# Patient Record
Sex: MALE | Race: OTHER
[De-identification: names, ages, dates, MRNs, and addresses within clinical notes are randomized per-mention and may not be internally consistent; named-entity substitution may affect disease eponyms.]

---

## 2017-03-08 ENCOUNTER — APPOINTMENT (OUTPATIENT)
Dept: GASTROENTEROLOGY | Facility: CLINIC | Age: 77
End: 2017-03-08

## 2017-03-08 VITALS
HEIGHT: 69 IN | DIASTOLIC BLOOD PRESSURE: 90 MMHG | RESPIRATION RATE: 16 BRPM | BODY MASS INDEX: 35.55 KG/M2 | SYSTOLIC BLOOD PRESSURE: 130 MMHG | HEART RATE: 75 BPM | WEIGHT: 240 LBS | OXYGEN SATURATION: 93 % | TEMPERATURE: 97.3 F

## 2017-05-05 ENCOUNTER — APPOINTMENT (OUTPATIENT)
Dept: GASTROENTEROLOGY | Facility: CLINIC | Age: 77
End: 2017-05-05

## 2017-05-05 VITALS
DIASTOLIC BLOOD PRESSURE: 80 MMHG | OXYGEN SATURATION: 98 % | TEMPERATURE: 97.8 F | SYSTOLIC BLOOD PRESSURE: 130 MMHG | HEIGHT: 69 IN | BODY MASS INDEX: 36.43 KG/M2 | WEIGHT: 246 LBS | HEART RATE: 71 BPM

## 2017-12-22 ENCOUNTER — APPOINTMENT (OUTPATIENT)
Dept: GASTROENTEROLOGY | Facility: CLINIC | Age: 77
End: 2017-12-22
Payer: MEDICARE

## 2017-12-22 VITALS
SYSTOLIC BLOOD PRESSURE: 128 MMHG | OXYGEN SATURATION: 96 % | HEART RATE: 63 BPM | BODY MASS INDEX: 35.59 KG/M2 | TEMPERATURE: 97.8 F | WEIGHT: 241 LBS | DIASTOLIC BLOOD PRESSURE: 80 MMHG

## 2017-12-22 PROCEDURE — 99214 OFFICE O/P EST MOD 30 MIN: CPT

## 2018-03-19 ENCOUNTER — RX RENEWAL (OUTPATIENT)
Age: 78
End: 2018-03-19

## 2018-03-23 ENCOUNTER — RX RENEWAL (OUTPATIENT)
Age: 78
End: 2018-03-23

## 2018-06-22 ENCOUNTER — APPOINTMENT (OUTPATIENT)
Dept: GASTROENTEROLOGY | Facility: CLINIC | Age: 78
End: 2018-06-22
Payer: MEDICARE

## 2018-06-22 VITALS
DIASTOLIC BLOOD PRESSURE: 78 MMHG | HEART RATE: 69 BPM | BODY MASS INDEX: 35.55 KG/M2 | HEIGHT: 69 IN | SYSTOLIC BLOOD PRESSURE: 118 MMHG | OXYGEN SATURATION: 94 % | WEIGHT: 240 LBS

## 2018-06-22 PROCEDURE — 99213 OFFICE O/P EST LOW 20 MIN: CPT

## 2019-01-04 ENCOUNTER — APPOINTMENT (OUTPATIENT)
Dept: GASTROENTEROLOGY | Facility: CLINIC | Age: 79
End: 2019-01-04
Payer: MEDICARE

## 2019-01-04 VITALS
SYSTOLIC BLOOD PRESSURE: 128 MMHG | WEIGHT: 232 LBS | OXYGEN SATURATION: 97 % | HEIGHT: 69 IN | BODY MASS INDEX: 34.36 KG/M2 | TEMPERATURE: 97.7 F | HEART RATE: 79 BPM | RESPIRATION RATE: 16 BRPM | DIASTOLIC BLOOD PRESSURE: 74 MMHG

## 2019-01-04 DIAGNOSIS — K92.1 MELENA: ICD-10-CM

## 2019-01-04 PROCEDURE — 99214 OFFICE O/P EST MOD 30 MIN: CPT

## 2019-01-04 NOTE — CONSULT LETTER
[Dear  ___] : Dear  [unfilled], [Consult Letter:] : I had the pleasure of evaluating your patient, [unfilled]. [Please see my note below.] : Please see my note below. [Consult Closing:] : Thank you very much for allowing me to participate in the care of this patient.  If you have any questions, please do not hesitate to contact me. [Sincerely,] : Sincerely, [Federico Longoria M.D.] : Federico Longoria M.D. [Division of Gastroenterology] : Division of Gastroenterology [[facility]] : [facility] [[address] : [address] [[place], [place] [address]] : [place] [address] [FreeTextEntry2] : Dr. Almas Adams [FreeTextEntry3] : Federico Longoria M.D.

## 2019-01-04 NOTE — HISTORY OF PRESENT ILLNESS
[FreeTextEntry1] : Melvin Herron was evaluated for office revisit on 1/4/19. \par \par The patient is on a maintenance regimen of lansoprazole 30 mg daily and he has derived an excellent symptomatic response.\par \par The patient had undergone followup surveillance colonoscopy on 8/3/12. Seven small polyps were removed of which 4 were adenomas and 3 were hyperplastic polyps. Sigmoid diverticulosis was noted. Hemorrhoids were detected. An upper endoscopy was performed at the same session and the previously noted prepyloric antral diverticulum was again detected but was less prominent. A small 1 cm hiatus hernia was noted. Fundic gland gastric polyps were detected. A gastric biopsy tested negative for Helicobacter pylori.\par \par Colonoscopy was performed on 7/11/2016 at which time 8 colonic polyps were noted that were mostly small ranging in size from 3 to 5 mm. Most were in the ascending colon. The largest was a 10 mm proximal transverse colon sessile polyp. These polyps were 4 adenomas, 2 hyperplastic polyps and a descending colon sessile serrated polyp. Sigmoid diverticulosis was noted. Hemorrhoids were detected.\par \par Patient otherwise reports no new or interval change to his medical history or medications.\par \par ORV 3/8/17: Experienced scant episode of painless hematochezia. No blood noted on toilet paper after wiping but also on underwear and bed sheets. Has one formed bowel movement daily and observed no blood in the stool.\par \par ORV 5/5/17:    -Asymptomatic from a GI standpoint. Excellent appetite. Has gained 12 pounds over the past 6 months. Denies any complaints of dysphagia, heartburn, nausea, vomiting, abdominal pain, diarrhea, change in bowel habit or rectal bleeding.\par                        -Scant hematochezia previously reported on 3/8/17 resolved.\par                        -Patient remains on maintenance lansoprazole 30 mg daily.\par \par ORV 12/22/17:    -Patient is doing well from a GI standpoint. No complaints. Appetite and weight are stable. No current complaints of dysphagia, heartburn, nausea, vomiting or abdominal pain. Patient has regular bowel movements without any complaints of diarrhea, constipation, change in bowel habit or rectal bleeding.\par                            -Patient currently on lansoprazole 30 mg daily on maintenance basis. Patient reports no change to medications. Of note, the patient indicates that he ran out of nifedipine ER 90 mg daily which is one of his antihypertensive medications 2 weeks ago. \par \par ORV 6/22/18:    -Patient feels well. Asymptomatic from a GI standpoint. Appetite stable. Denies weight change. Denies any complaints of dysphagia, heartburn, nausea, vomiting or abdominal pain. Has regular daily bowel movements without any complaint of diarrhea, constipation, change in bowel habit or rectal bleeding.\par                          -Patient reports no change to medical status or medications since last examination. Patient remains in maintenance lansoprazole 30 mg daily.\par \par ORV 1/4/19:     -Presents for routine GI followup. Wife reports a few episodes of scant hematochezia over past several weeks. Primarily manifesting as red blood on the toilet paper or scant blood in the bowl. Patient has 2 formed bowel movements daily without any complaints of diarrhea, constipation, change in bowel habit, anal pain or melena. Appetite and weight are stable. Reports no complaints of heartburn, regurgitation, dysphagia, nausea, vomiting or abdominal pain.

## 2019-01-04 NOTE — REASON FOR VISIT
[Follow-Up: _____] : a [unfilled] follow-up visit [Spouse] : spouse [FreeTextEntry1] : for routine GI followup.

## 2019-01-04 NOTE — ASSESSMENT
[FreeTextEntry1] : Impression:\par \par #1 peptic ulcer disease-history of a walled off perforated prepyloric antral diverticulum. Healed and resolved-the patient is asymptomatic.\par \par #2 history of colonic polyps-history of multiple colonic polyps including frequent occurrence of metachronous lesions. The patient had 8 small polyps at his last colonoscopy on 7/11/2016 of which 4  proved to be adenomas, 2 were hyperplastic polyps and one was a descending colon sessile serrated polyp.\par \par #3 sigmoid diverticulosis.\par \par #4 hematochezia-suspect outlet etiology from hemorrhoids. Evaluate for any alternate etiology including any possibility of a colonic neoplastic process.\par \par #5 status post bilateral inguinal hernia repair.\par \par #6 obesity.\par \par #7 gastroesophageal reflux disease-asymptomatic on maintenance lansoprazole 30 mg daily.\par \par #8 varicose veins.\par \par #9 hypertension.\par \par #10 kidney stones\par

## 2019-01-04 NOTE — LETTER CLOSING
[Consult] : Thank you very much for allowing me to participate in the care of this patient. If you have any questions, please do not hesitate to contact me [Sincerely,] : Sincerely, [Federico Longoria M.D.] : Federico Longoria M.D. [Division of Gastroenterology] : Division of Gastroenterology [[facility]] : [facility] [[address] : [address] [[place], [place] [address]] : [place] [address]

## 2019-01-04 NOTE — PHYSICAL EXAM
[General Appearance - Alert] : alert [General Appearance - In No Acute Distress] : in no acute distress [General Appearance - Well Developed] : well developed [General Appearance - Well-Appearing] : healthy appearing [Sclera] : the sclera and conjunctiva were normal [Neck Appearance] : the appearance of the neck was normal [Respiration, Rhythm And Depth] : normal respiratory rhythm and effort [Exaggerated Use Of Accessory Muscles For Inspiration] : no accessory muscle use [Auscultation Breath Sounds / Voice Sounds] : lungs were clear to auscultation bilaterally [Heart Rate And Rhythm] : heart rate was normal and rhythm regular [Heart Sounds] : normal S1 and S2 [Heart Sounds Gallop] : no gallops [Murmurs] : no murmurs [Heart Sounds Pericardial Friction Rub] : no pericardial rub [Bowel Sounds] : normal bowel sounds [Abdomen Soft] : soft [Abdomen Tenderness] : non-tender [] : no hepato-splenomegaly [Abdomen Mass (___ Cm)] : no abdominal mass palpated [Normal Sphincter Tone] : normal sphincter tone [No Rectal Mass] : no rectal mass [Prostate Tenderness] : the prostate was not tender [Cervical Lymph Nodes Enlarged Posterior Bilaterally] : posterior cervical [Cervical Lymph Nodes Enlarged Anterior Bilaterally] : anterior cervical [Supraclavicular Lymph Nodes Enlarged Bilaterally] : supraclavicular [Abnormal Walk] : normal gait [Nail Clubbing] : no clubbing  or cyanosis of the fingernails [Skin Color & Pigmentation] : normal skin color and pigmentation [Impaired Insight] : insight and judgment were intact [Affect] : the affect was normal [Mood] : the mood was normal [FreeTextEntry1] : Large smooth prostate palpated. Soft light brown stool noted. No mass. Blood not detected.

## 2019-03-13 ENCOUNTER — MEDICATION RENEWAL (OUTPATIENT)
Age: 79
End: 2019-03-13

## 2019-03-13 ENCOUNTER — MESSAGE (OUTPATIENT)
Age: 79
End: 2019-03-13

## 2019-03-13 RX ORDER — LANSOPRAZOLE 30 MG/1
30 CAPSULE, DELAYED RELEASE ORAL
Qty: 90 | Refills: 3 | Status: COMPLETED | COMMUNITY
Start: 2017-12-22 | End: 2019-03-13

## 2019-03-13 RX ORDER — LANSOPRAZOLE 30 MG/1
30 CAPSULE, DELAYED RELEASE ORAL
Qty: 90 | Refills: 3 | Status: COMPLETED | COMMUNITY
Start: 2018-06-22 | End: 2019-03-13

## 2019-03-13 RX ORDER — LANSOPRAZOLE 30 MG/1
30 CAPSULE, DELAYED RELEASE ORAL
Qty: 90 | Refills: 3 | Status: COMPLETED | COMMUNITY
Start: 2017-05-05 | End: 2019-03-13

## 2019-05-17 ENCOUNTER — APPOINTMENT (OUTPATIENT)
Dept: GASTROENTEROLOGY | Facility: CLINIC | Age: 79
End: 2019-05-17

## 2019-06-20 ENCOUNTER — APPOINTMENT (OUTPATIENT)
Dept: GASTROENTEROLOGY | Facility: HOSPITAL | Age: 79
End: 2019-06-20

## 2019-06-20 ENCOUNTER — OUTPATIENT (OUTPATIENT)
Dept: OUTPATIENT SERVICES | Facility: HOSPITAL | Age: 79
LOS: 1 days | Discharge: HOME | End: 2019-06-20
Payer: MEDICARE

## 2019-06-20 ENCOUNTER — RESULT REVIEW (OUTPATIENT)
Age: 79
End: 2019-06-20

## 2019-06-20 DIAGNOSIS — K92.1 MELENA: ICD-10-CM

## 2019-06-20 DIAGNOSIS — N20.0 CALCULUS OF KIDNEY: Chronic | ICD-10-CM

## 2019-06-20 PROCEDURE — 45385 COLONOSCOPY W/LESION REMOVAL: CPT

## 2019-06-20 PROCEDURE — 88305 TISSUE EXAM BY PATHOLOGIST: CPT | Mod: 26

## 2019-06-20 PROCEDURE — 45380 COLONOSCOPY AND BIOPSY: CPT | Mod: 59

## 2019-06-20 RX ORDER — SODIUM CHLORIDE 9 MG/ML
1000 INJECTION, SOLUTION INTRAVENOUS
Refills: 0 | Status: DISCONTINUED | OUTPATIENT
Start: 2019-06-20 | End: 2019-07-05

## 2019-06-20 RX ADMIN — SODIUM CHLORIDE 30 MILLILITER(S): 9 INJECTION, SOLUTION INTRAVENOUS at 12:52

## 2019-06-21 LAB — SURGICAL PATHOLOGY STUDY: SIGNIFICANT CHANGE UP

## 2019-07-05 ENCOUNTER — APPOINTMENT (OUTPATIENT)
Dept: GASTROENTEROLOGY | Facility: CLINIC | Age: 79
End: 2019-07-05

## 2019-08-06 ENCOUNTER — APPOINTMENT (OUTPATIENT)
Dept: ORTHOPEDIC SURGERY | Facility: CLINIC | Age: 79
End: 2019-08-06
Payer: MEDICARE

## 2019-08-06 VITALS — WEIGHT: 236 LBS | HEIGHT: 69 IN | BODY MASS INDEX: 34.96 KG/M2

## 2019-08-06 DIAGNOSIS — M79.671 PAIN IN RIGHT FOOT: ICD-10-CM

## 2019-08-06 DIAGNOSIS — Z87.442 PERSONAL HISTORY OF URINARY CALCULI: ICD-10-CM

## 2019-08-06 DIAGNOSIS — Z78.9 OTHER SPECIFIED HEALTH STATUS: ICD-10-CM

## 2019-08-06 DIAGNOSIS — S93.601A UNSPECIFIED SPRAIN OF RIGHT FOOT, INITIAL ENCOUNTER: ICD-10-CM

## 2019-08-06 PROCEDURE — 73610 X-RAY EXAM OF ANKLE: CPT | Mod: RT

## 2019-08-06 PROCEDURE — 99204 OFFICE O/P NEW MOD 45 MIN: CPT

## 2019-08-06 PROCEDURE — 73630 X-RAY EXAM OF FOOT: CPT | Mod: RT

## 2019-08-06 RX ORDER — LANSOPRAZOLE 30 MG/1
30 CAPSULE, DELAYED RELEASE ORAL
Qty: 90 | Refills: 3 | Status: DISCONTINUED | COMMUNITY
Start: 2019-01-04 | End: 2019-08-06

## 2019-08-06 RX ORDER — POLYETHYLENE GLYCOL 3350, SODIUM SULFATE, SODIUM CHLORIDE, POTASSIUM CHLORIDE, ASCORBIC ACID, SODIUM ASCORBATE 7.5-2.691G
100 KIT ORAL
Qty: 1 | Refills: 0 | Status: DISCONTINUED | COMMUNITY
Start: 2019-01-04 | End: 2019-08-06

## 2019-08-06 RX ORDER — PRAMOXINE HYDROCHLORIDE AND HYDROCORTISONE ACETATE 10; 25 MG/G; MG/G
2.5-1 CREAM TOPICAL
Qty: 28.35 | Refills: 2 | Status: DISCONTINUED | COMMUNITY
Start: 2017-03-08 | End: 2019-08-06

## 2019-08-07 NOTE — DISCUSSION/SUMMARY
[de-identified] : Patient was advised of his findings. He appears to have a medial plantar arch devin and was prescribed bilateral shoe orthotics.\par \par Followup p.r.n.

## 2019-08-07 NOTE — HISTORY OF PRESENT ILLNESS
[Sneakers] : renee [FreeTextEntry1] : Pt presents  for initial visit  with pain for approx one year, in his right foot.  Pt is wearing sneakers. Pt is using a cane to ambulate. Tylenol taken for pain prn with some relief. There is no known injury. There is multiple varicosities noted to the lower extremities.

## 2019-08-07 NOTE — PHYSICAL EXAM
[de-identified] : Examination of the patient's right foot and ankle discloses nontender stable range of motion to the foot and ankle. Mild restriction of inversion and eversion of the right foot. Tenderness to the medial plantar arch. [de-identified] : X-rays taken of the right ankle and foot in AP lateral and oblique projections each disclose no generative arthritic midfoot changes otherwise nonspecific

## 2019-08-30 ENCOUNTER — APPOINTMENT (OUTPATIENT)
Dept: GASTROENTEROLOGY | Facility: CLINIC | Age: 79
End: 2019-08-30
Payer: MEDICARE

## 2019-08-30 VITALS
HEIGHT: 69 IN | DIASTOLIC BLOOD PRESSURE: 78 MMHG | OXYGEN SATURATION: 96 % | HEART RATE: 79 BPM | BODY MASS INDEX: 33.77 KG/M2 | RESPIRATION RATE: 18 BRPM | WEIGHT: 228 LBS | TEMPERATURE: 98.1 F | SYSTOLIC BLOOD PRESSURE: 120 MMHG

## 2019-08-30 PROCEDURE — 99213 OFFICE O/P EST LOW 20 MIN: CPT

## 2019-08-30 NOTE — CONSULT LETTER
[Dear  ___] : Dear  [unfilled], [Consult Letter:] : I had the pleasure of evaluating your patient, [unfilled]. [Please see my note below.] : Please see my note below. [Consult Closing:] : Thank you very much for allowing me to participate in the care of this patient.  If you have any questions, please do not hesitate to contact me. [Sincerely,] : Sincerely, [Federico Longoria M.D.] : Federico Longoria M.D. [Division of Gastroenterology] : Division of Gastroenterology [[facility]] : [facility] [[address] : [address] [[place], [place] [address]] : [place] [address] [FreeTextEntry2] : Dr. Almas Adams [FreeTextEntry3] : Federico Longoria M.D.

## 2019-08-30 NOTE — PHYSICAL EXAM
[General Appearance - Alert] : alert [General Appearance - In No Acute Distress] : in no acute distress [General Appearance - Well Developed] : well developed [General Appearance - Well-Appearing] : healthy appearing [Sclera] : the sclera and conjunctiva were normal [Oropharynx] : the oropharynx was normal [Neck Appearance] : the appearance of the neck was normal [Respiration, Rhythm And Depth] : normal respiratory rhythm and effort [Exaggerated Use Of Accessory Muscles For Inspiration] : no accessory muscle use [Auscultation Breath Sounds / Voice Sounds] : lungs were clear to auscultation bilaterally [Heart Rate And Rhythm] : heart rate was normal and rhythm regular [Heart Sounds] : normal S1 and S2 [Heart Sounds Gallop] : no gallops [Murmurs] : no murmurs [Heart Sounds Pericardial Friction Rub] : no pericardial rub [Bowel Sounds] : normal bowel sounds [Abdomen Soft] : soft [Abdomen Tenderness] : non-tender [] : no hepato-splenomegaly [Abdomen Mass (___ Cm)] : no abdominal mass palpated [Normal Sphincter Tone] : normal sphincter tone [No Rectal Mass] : no rectal mass [Prostate Tenderness] : the prostate was not tender [Cervical Lymph Nodes Enlarged Posterior Bilaterally] : posterior cervical [Cervical Lymph Nodes Enlarged Anterior Bilaterally] : anterior cervical [Supraclavicular Lymph Nodes Enlarged Bilaterally] : supraclavicular [Nail Clubbing] : no clubbing  or cyanosis of the fingernails [Abnormal Walk] : normal gait [Skin Color & Pigmentation] : normal skin color and pigmentation [Impaired Insight] : insight and judgment were intact [Affect] : the affect was normal [Mood] : the mood was normal [FreeTextEntry1] : Large smooth prostate palpated. Soft light brown stool noted. No mass. Blood not detected.

## 2019-08-30 NOTE — HISTORY OF PRESENT ILLNESS
[FreeTextEntry1] : Melvin Herron was evaluated for office revisit on 8/30/19. \par \par COLONOSCOPY 8/3/12:      -The patient had undergone followup surveillance colonoscopy on 8/3/12. Seven small polyps were removed of which 4 were adenomas and 3 were hyperplastic polyps. Sigmoid diverticulosis was noted. Hemorrhoids were detected. An upper endoscopy was performed at the same session and the previously noted prepyloric antral diverticulum was again detected but was less prominent. A small 1 cm hiatus hernia was noted. Fundic gland gastric polyps were detected. A gastric biopsy tested negative for Helicobacter pylori.\par \par COLONOSCOPY 7/11/16:     -Colonoscopy was performed on 7/11/2016 at which time 8 colonic polyps were noted that were mostly small ranging in size from 3 to 5 mm. Most were in the ascending colon. The largest was a 10 mm proximal transverse colon sessile polyp. These polyps were 4 adenomas, 2 hyperplastic polyps and a descending colon sessile serrated polyp. Sigmoid diverticulosis was noted. Hemorrhoids were detected.\par \par ORV 3/8/17: Experienced scant episode of painless hematochezia. No blood noted on toilet paper after wiping but also on underwear and bed sheets. Has one formed bowel movement daily and observed no blood in the stool.\par \par ORV 5/5/17:    -Asymptomatic from a GI standpoint. Excellent appetite. Has gained 12 pounds over the past 6 months. Denies any complaints of dysphagia, heartburn, nausea, vomiting, abdominal pain, diarrhea, change in bowel habit or rectal bleeding.\par                        -Scant hematochezia previously reported on 3/8/17 resolved.\par                        -Patient remains on maintenance lansoprazole 30 mg daily.\par \par ORV 12/22/17:    -Patient is doing well from a GI standpoint. No complaints. Appetite and weight are stable. No current complaints of dysphagia, heartburn, nausea, vomiting or abdominal pain. Patient has regular bowel movements without any complaints of diarrhea, constipation, change in bowel habit or rectal bleeding.\par                            -Patient currently on lansoprazole 30 mg daily on maintenance basis. Patient reports no change to medications. Of note, the patient indicates that he ran out of nifedipine ER 90 mg daily which is one of his antihypertensive medications 2 weeks ago. \par \par ORV 6/22/18:    -Patient feels well. Asymptomatic from a GI standpoint. Appetite stable. Denies weight change. Denies any complaints of dysphagia, heartburn, nausea, vomiting or abdominal pain. Has regular daily bowel movements without any complaint of diarrhea, constipation, change in bowel habit or rectal bleeding.\par                          -Patient reports no change to medical status or medications since last examination. Patient remains in maintenance lansoprazole 30 mg daily.\par \par ORV 1/4/19:     -Presents for routine GI followup. Wife reports a few episodes of scant hematochezia over past several weeks. Primarily manifesting as red blood on the toilet paper or scant blood in the bowl. Patient has 2 formed bowel movements daily without any complaints of diarrhea, constipation, change in bowel habit, anal pain or melena. Appetite and weight are stable. Reports no complaints of heartburn, regurgitation, dysphagia, nausea, vomiting or abdominal pain.\par \par COLONOSCOPY 6/20/19:     -Surveillance colonoscopy was performed on 6/20/19. Total colonoscopy was performed to the cecum with ileoscopy. Normal terminal ileum. There were 13 colon polyps removed of which 11 were located between the cecum and hepatic flexure. The polyps were all small ranging in size from 4-8 mm. A mix of adenoma, sessile serrated polyp and hyperplastic polyps were removed. Diverticulosis was noted in the sigmoid colon, descending colon and cecum. The sigmoid diverticulosis with marked associated muscular hypertrophy and luminal narrowing. Followup surveillance colonoscopy is advised in 2 years in view of the large number of polyps. \par \par ORV 8/30/19:      -Doing well from GI standpoint. Appetite and weight stable. Reports no complaints of dysphagia, heartburn, nausea, vomiting or abdominal pain. Experiencing some mild constipation. Denies rectal bleeding. Patient otherwise reports no change in medical status or medication since last examination. Reports normal routine labs by PMD recently submitted.\par

## 2019-08-30 NOTE — ASSESSMENT
[FreeTextEntry1] : Impression:\par \par #1 peptic ulcer disease-history of a walled off perforated prepyloric antral diverticulum. Healed and resolved-the patient is asymptomatic.\par \par #2 history of colonic polyps-history of multiple colonic polyps including frequent occurrence of metachronous lesions. The patient had 8 small polyps at his last colonoscopy on 7/11/2016 of which 4  proved to be adenomas, 2 were hyperplastic polyps and one was a descending colon sessile serrated polyp. Surveillance colonoscopy on 6/20/19 noted for removal of 13 colon polyps most of which were proximal in location and relatively small ranging in size from 4-8 mm with removal of a mix of adenoma, sessile serrated polyp and hyperplastic polyps.\par \par #3 sigmoid diverticulosis.\par \par #4 hematochezia- resolved at the current time. Attributed to hemorrhoids.\par \par #5 constipation-consistent with normal transit constipation of functional etiology.\par \par #6 obesity.\par \par #7 gastroesophageal reflux disease-asymptomatic on maintenance pantoprazole 40 mg daily.\par \par #8 varicose veins.\par \par #9 hypertension.\par \par #10 kidney stones.\par \par #11 status post BIH repair.\par 
no

## 2019-09-17 ENCOUNTER — INPATIENT (INPATIENT)
Facility: HOSPITAL | Age: 79
LOS: 3 days | Discharge: HOME | End: 2019-09-21
Attending: INTERNAL MEDICINE | Admitting: INTERNAL MEDICINE
Payer: MEDICARE

## 2019-09-17 VITALS
OXYGEN SATURATION: 96 % | DIASTOLIC BLOOD PRESSURE: 80 MMHG | RESPIRATION RATE: 17 BRPM | HEART RATE: 74 BPM | TEMPERATURE: 98 F | SYSTOLIC BLOOD PRESSURE: 140 MMHG

## 2019-09-17 DIAGNOSIS — R53.1 WEAKNESS: ICD-10-CM

## 2019-09-17 DIAGNOSIS — N20.0 CALCULUS OF KIDNEY: Chronic | ICD-10-CM

## 2019-09-17 DIAGNOSIS — E78.5 HYPERLIPIDEMIA, UNSPECIFIED: ICD-10-CM

## 2019-09-17 DIAGNOSIS — G31.84 MILD COGNITIVE IMPAIRMENT OF UNCERTAIN OR UNKNOWN ETIOLOGY: ICD-10-CM

## 2019-09-17 DIAGNOSIS — N40.0 BENIGN PROSTATIC HYPERPLASIA WITHOUT LOWER URINARY TRACT SYMPTOMS: ICD-10-CM

## 2019-09-17 DIAGNOSIS — I10 ESSENTIAL (PRIMARY) HYPERTENSION: ICD-10-CM

## 2019-09-17 DIAGNOSIS — R42 DIZZINESS AND GIDDINESS: ICD-10-CM

## 2019-09-17 DIAGNOSIS — R74.8 ABNORMAL LEVELS OF OTHER SERUM ENZYMES: ICD-10-CM

## 2019-09-17 DIAGNOSIS — Z29.9 ENCOUNTER FOR PROPHYLACTIC MEASURES, UNSPECIFIED: ICD-10-CM

## 2019-09-17 LAB
ALBUMIN SERPL ELPH-MCNC: 4.3 G/DL — SIGNIFICANT CHANGE UP (ref 3.3–5)
ALP SERPL-CCNC: 80 U/L — SIGNIFICANT CHANGE UP (ref 40–120)
ALT FLD-CCNC: 40 U/L — SIGNIFICANT CHANGE UP (ref 4–41)
ANION GAP SERPL CALC-SCNC: 15 MMO/L — HIGH (ref 7–14)
APPEARANCE UR: SIGNIFICANT CHANGE UP
APTT BLD: 22 SEC — LOW (ref 27.5–36.3)
AST SERPL-CCNC: 41 U/L — HIGH (ref 4–40)
BACTERIA # UR AUTO: NEGATIVE — SIGNIFICANT CHANGE UP
BASE EXCESS BLDV CALC-SCNC: 5.8 MMOL/L — SIGNIFICANT CHANGE UP
BASOPHILS # BLD AUTO: 0.03 K/UL — SIGNIFICANT CHANGE UP (ref 0–0.2)
BASOPHILS NFR BLD AUTO: 0.4 % — SIGNIFICANT CHANGE UP (ref 0–2)
BILIRUB SERPL-MCNC: 1 MG/DL — SIGNIFICANT CHANGE UP (ref 0.2–1.2)
BILIRUB UR-MCNC: NEGATIVE — SIGNIFICANT CHANGE UP
BLOOD GAS VENOUS - CREATININE: 1.15 MG/DL — SIGNIFICANT CHANGE UP (ref 0.5–1.3)
BLOOD UR QL VISUAL: NEGATIVE — SIGNIFICANT CHANGE UP
BUN SERPL-MCNC: 17 MG/DL — SIGNIFICANT CHANGE UP (ref 7–23)
CALCIUM SERPL-MCNC: 8 MG/DL — LOW (ref 8.4–10.5)
CHLORIDE BLDV-SCNC: 105 MMOL/L — SIGNIFICANT CHANGE UP (ref 96–108)
CHLORIDE SERPL-SCNC: 98 MMOL/L — SIGNIFICANT CHANGE UP (ref 98–107)
CK SERPL-CCNC: 464 U/L — HIGH (ref 30–200)
CK SERPL-CCNC: 554 U/L — HIGH (ref 30–200)
CO2 SERPL-SCNC: 28 MMOL/L — SIGNIFICANT CHANGE UP (ref 22–31)
COLOR SPEC: YELLOW — SIGNIFICANT CHANGE UP
CREAT SERPL-MCNC: 1.27 MG/DL — SIGNIFICANT CHANGE UP (ref 0.5–1.3)
CRP SERPL-MCNC: 70.8 MG/L — HIGH
EOSINOPHIL # BLD AUTO: 0.01 K/UL — SIGNIFICANT CHANGE UP (ref 0–0.5)
EOSINOPHIL NFR BLD AUTO: 0.1 % — SIGNIFICANT CHANGE UP (ref 0–6)
GAS PNL BLDV: 140 MMOL/L — SIGNIFICANT CHANGE UP (ref 136–146)
GLUCOSE BLDV-MCNC: 95 MG/DL — SIGNIFICANT CHANGE UP (ref 70–99)
GLUCOSE SERPL-MCNC: 96 MG/DL — SIGNIFICANT CHANGE UP (ref 70–99)
GLUCOSE UR-MCNC: NEGATIVE — SIGNIFICANT CHANGE UP
HCO3 BLDV-SCNC: 26 MMOL/L — SIGNIFICANT CHANGE UP (ref 20–27)
HCT VFR BLD CALC: 43.3 % — SIGNIFICANT CHANGE UP (ref 39–50)
HCT VFR BLD CALC: 49.4 % — SIGNIFICANT CHANGE UP (ref 39–50)
HCT VFR BLDV CALC: 50.3 % — SIGNIFICANT CHANGE UP (ref 39–51)
HGB BLD-MCNC: 14.1 G/DL — SIGNIFICANT CHANGE UP (ref 13–17)
HGB BLD-MCNC: 15.9 G/DL — SIGNIFICANT CHANGE UP (ref 13–17)
HGB BLDV-MCNC: 16.5 G/DL — SIGNIFICANT CHANGE UP (ref 13–17)
HYALINE CASTS # UR AUTO: SIGNIFICANT CHANGE UP
IMM GRANULOCYTES NFR BLD AUTO: 0.7 % — SIGNIFICANT CHANGE UP (ref 0–1.5)
INR BLD: 1.14 — SIGNIFICANT CHANGE UP (ref 0.88–1.17)
KETONES UR-MCNC: NEGATIVE — SIGNIFICANT CHANGE UP
LACTATE BLDV-MCNC: 1.9 MMOL/L — SIGNIFICANT CHANGE UP (ref 0.5–2)
LACTATE BLDV-MCNC: 2.6 MMOL/L — HIGH (ref 0.5–2)
LACTATE SERPL-SCNC: 1 MMOL/L — SIGNIFICANT CHANGE UP (ref 0.5–2)
LEUKOCYTE ESTERASE UR-ACNC: NEGATIVE — SIGNIFICANT CHANGE UP
LIDOCAIN IGE QN: 48.1 U/L — SIGNIFICANT CHANGE UP (ref 7–60)
LYMPHOCYTES # BLD AUTO: 1.02 K/UL — SIGNIFICANT CHANGE UP (ref 1–3.3)
LYMPHOCYTES # BLD AUTO: 13.5 % — SIGNIFICANT CHANGE UP (ref 13–44)
MCHC RBC-ENTMCNC: 28.6 PG — SIGNIFICANT CHANGE UP (ref 27–34)
MCHC RBC-ENTMCNC: 28.6 PG — SIGNIFICANT CHANGE UP (ref 27–34)
MCHC RBC-ENTMCNC: 32.2 % — SIGNIFICANT CHANGE UP (ref 32–36)
MCHC RBC-ENTMCNC: 32.6 % — SIGNIFICANT CHANGE UP (ref 32–36)
MCV RBC AUTO: 87.8 FL — SIGNIFICANT CHANGE UP (ref 80–100)
MCV RBC AUTO: 89 FL — SIGNIFICANT CHANGE UP (ref 80–100)
MONOCYTES # BLD AUTO: 1.03 K/UL — HIGH (ref 0–0.9)
MONOCYTES NFR BLD AUTO: 13.6 % — SIGNIFICANT CHANGE UP (ref 2–14)
NEUTROPHILS # BLD AUTO: 5.41 K/UL — SIGNIFICANT CHANGE UP (ref 1.8–7.4)
NEUTROPHILS NFR BLD AUTO: 71.7 % — SIGNIFICANT CHANGE UP (ref 43–77)
NITRITE UR-MCNC: NEGATIVE — SIGNIFICANT CHANGE UP
NRBC # FLD: 0 K/UL — SIGNIFICANT CHANGE UP (ref 0–0)
NRBC # FLD: 0 K/UL — SIGNIFICANT CHANGE UP (ref 0–0)
OB PNL STL: NEGATIVE — SIGNIFICANT CHANGE UP
PCO2 BLDV: 58 MMHG — HIGH (ref 41–51)
PH BLDV: 7.35 PH — SIGNIFICANT CHANGE UP (ref 7.32–7.43)
PH UR: 6 — SIGNIFICANT CHANGE UP (ref 5–8)
PLATELET # BLD AUTO: 160 K/UL — SIGNIFICANT CHANGE UP (ref 150–400)
PLATELET # BLD AUTO: 166 K/UL — SIGNIFICANT CHANGE UP (ref 150–400)
PMV BLD: 10.6 FL — SIGNIFICANT CHANGE UP (ref 7–13)
PMV BLD: 9.8 FL — SIGNIFICANT CHANGE UP (ref 7–13)
PO2 BLDV: < 24 MMHG — LOW (ref 35–40)
POTASSIUM BLDV-SCNC: 3.7 MMOL/L — SIGNIFICANT CHANGE UP (ref 3.4–4.5)
POTASSIUM SERPL-MCNC: 3.9 MMOL/L — SIGNIFICANT CHANGE UP (ref 3.5–5.3)
POTASSIUM SERPL-SCNC: 3.9 MMOL/L — SIGNIFICANT CHANGE UP (ref 3.5–5.3)
PROT SERPL-MCNC: 8.1 G/DL — SIGNIFICANT CHANGE UP (ref 6–8.3)
PROT UR-MCNC: 70 — SIGNIFICANT CHANGE UP
PROTHROM AB SERPL-ACNC: 13 SEC — SIGNIFICANT CHANGE UP (ref 9.8–13.1)
RBC # BLD: 4.93 M/UL — SIGNIFICANT CHANGE UP (ref 4.2–5.8)
RBC # BLD: 5.55 M/UL — SIGNIFICANT CHANGE UP (ref 4.2–5.8)
RBC # FLD: 13.1 % — SIGNIFICANT CHANGE UP (ref 10.3–14.5)
RBC # FLD: 13.2 % — SIGNIFICANT CHANGE UP (ref 10.3–14.5)
RBC CASTS # UR COMP ASSIST: SIGNIFICANT CHANGE UP (ref 0–?)
SAO2 % BLDV: 14.5 % — LOW (ref 60–85)
SODIUM SERPL-SCNC: 141 MMOL/L — SIGNIFICANT CHANGE UP (ref 135–145)
SP GR SPEC: 1.02 — SIGNIFICANT CHANGE UP (ref 1–1.04)
SQUAMOUS # UR AUTO: SIGNIFICANT CHANGE UP
TROPONIN T, HIGH SENSITIVITY: 30 NG/L — SIGNIFICANT CHANGE UP (ref ?–14)
TROPONIN T, HIGH SENSITIVITY: 39 NG/L — SIGNIFICANT CHANGE UP (ref ?–14)
TSH SERPL-MCNC: 0.81 UIU/ML — SIGNIFICANT CHANGE UP (ref 0.27–4.2)
UROBILINOGEN FLD QL: NORMAL — SIGNIFICANT CHANGE UP
WBC # BLD: 6.21 K/UL — SIGNIFICANT CHANGE UP (ref 3.8–10.5)
WBC # BLD: 7.55 K/UL — SIGNIFICANT CHANGE UP (ref 3.8–10.5)
WBC # FLD AUTO: 6.21 K/UL — SIGNIFICANT CHANGE UP (ref 3.8–10.5)
WBC # FLD AUTO: 7.55 K/UL — SIGNIFICANT CHANGE UP (ref 3.8–10.5)
WBC UR QL: HIGH (ref 0–?)

## 2019-09-17 PROCEDURE — 71045 X-RAY EXAM CHEST 1 VIEW: CPT | Mod: 26

## 2019-09-17 PROCEDURE — 70498 CT ANGIOGRAPHY NECK: CPT | Mod: 26

## 2019-09-17 PROCEDURE — 74177 CT ABD & PELVIS W/CONTRAST: CPT | Mod: 26

## 2019-09-17 PROCEDURE — 70496 CT ANGIOGRAPHY HEAD: CPT | Mod: 26

## 2019-09-17 PROCEDURE — 99223 1ST HOSP IP/OBS HIGH 75: CPT

## 2019-09-17 RX ORDER — NIFEDIPINE 30 MG
90 TABLET, EXTENDED RELEASE 24 HR ORAL DAILY
Refills: 0 | Status: DISCONTINUED | OUTPATIENT
Start: 2019-09-17 | End: 2019-09-21

## 2019-09-17 RX ORDER — NIFEDIPINE 30 MG
90 TABLET, EXTENDED RELEASE 24 HR ORAL DAILY
Refills: 0 | Status: DISCONTINUED | OUTPATIENT
Start: 2019-09-17 | End: 2019-09-17

## 2019-09-17 RX ORDER — ASPIRIN/CALCIUM CARB/MAGNESIUM 324 MG
81 TABLET ORAL DAILY
Refills: 0 | Status: DISCONTINUED | OUTPATIENT
Start: 2019-09-17 | End: 2019-09-21

## 2019-09-17 RX ORDER — MECLIZINE HCL 12.5 MG
25 TABLET ORAL EVERY 8 HOURS
Refills: 0 | Status: DISCONTINUED | OUTPATIENT
Start: 2019-09-17 | End: 2019-09-21

## 2019-09-17 RX ORDER — LISINOPRIL 2.5 MG/1
20 TABLET ORAL DAILY
Refills: 0 | Status: DISCONTINUED | OUTPATIENT
Start: 2019-09-17 | End: 2019-09-21

## 2019-09-17 RX ORDER — LISINOPRIL 2.5 MG/1
20 TABLET ORAL DAILY
Refills: 0 | Status: DISCONTINUED | OUTPATIENT
Start: 2019-09-17 | End: 2019-09-17

## 2019-09-17 RX ORDER — SODIUM CHLORIDE 9 MG/ML
1000 INJECTION INTRAMUSCULAR; INTRAVENOUS; SUBCUTANEOUS ONCE
Refills: 0 | Status: COMPLETED | OUTPATIENT
Start: 2019-09-17 | End: 2019-09-17

## 2019-09-17 RX ORDER — MECLIZINE HCL 12.5 MG
50 TABLET ORAL ONCE
Refills: 0 | Status: COMPLETED | OUTPATIENT
Start: 2019-09-17 | End: 2019-09-17

## 2019-09-17 RX ORDER — METOCLOPRAMIDE HCL 10 MG
10 TABLET ORAL ONCE
Refills: 0 | Status: COMPLETED | OUTPATIENT
Start: 2019-09-17 | End: 2019-09-17

## 2019-09-17 RX ORDER — FINASTERIDE 5 MG/1
5 TABLET, FILM COATED ORAL DAILY
Refills: 0 | Status: DISCONTINUED | OUTPATIENT
Start: 2019-09-17 | End: 2019-09-21

## 2019-09-17 RX ORDER — PANTOPRAZOLE SODIUM 20 MG/1
40 TABLET, DELAYED RELEASE ORAL
Refills: 0 | Status: DISCONTINUED | OUTPATIENT
Start: 2019-09-17 | End: 2019-09-21

## 2019-09-17 RX ADMIN — SODIUM CHLORIDE 1000 MILLILITER(S): 9 INJECTION INTRAMUSCULAR; INTRAVENOUS; SUBCUTANEOUS at 14:33

## 2019-09-17 RX ADMIN — Medication 50 MILLIGRAM(S): at 13:57

## 2019-09-17 RX ADMIN — Medication 10 MILLIGRAM(S): at 13:58

## 2019-09-17 NOTE — ED PROVIDER NOTE - OBJECTIVE STATEMENT
78 y.o male pmhx of HTN, HLD coming in with 2 days of generalized weakness/fatigue and a gradual headache that started today with associated dizziness. Also has had loose bowel movements, non bloody however per wife noticed "pink" tinge on bedsheets. Pt currently asymptomatic.  Wife was concerned regarding his symptoms and brought him in. Denies fevers, chills, chest pain, sob, cough, abdominal pain, urinary symptoms.

## 2019-09-17 NOTE — ED PROVIDER NOTE - ATTENDING CONTRIBUTION TO CARE
Dr. Escalona:  I performed a face to face bedside interview with patient regarding history of present illness, review of symptoms and past medical history. I completed an independent physical exam.  I have discussed patient's plan of care with PA.   I agree with note as stated above, having amended the EMR as needed to reflect my findings.   This includes HISTORY OF PRESENT ILLNESS, HIV, PAST MEDICAL/SURGICAL/FAMILY/SOCIAL HISTORY, ALLERGIES AND HOME MEDICATIONS, REVIEW OF SYSTEMS, PHYSICAL EXAM, and any PROGRESS NOTES during the time I functioned as the attending physician for this patient.    78M h/o HTN, HLD, prostate disease, presents with generalized weakness x 2 days.  Also endorses nausea and decreased PO intake.  This morning, had headache that resolved with Tylenol.  Also has had episodes of dizziness described as room spinning.  Denies h/o similar symptoms.  denies fever/chills, cp, vomiting/diarrhea, urinary symptoms.    Exam:  - nad  - rrr  - ctab   -abd soft ntnd  - no focal neuro deficits    A/P  # weakness, eval for infection, r/o ACS  # nausea, headache, vertigo-like symptoms, consider CVA/TIA  - cbc, cmp, ct head, cta head/neck, ct abd/pelvis, ua, urine culture, trop, ekg

## 2019-09-17 NOTE — H&P ADULT - HISTORY OF PRESENT ILLNESS
Mr. Herron is a 78 year old man with a history of HTN, HLD, kidney stones, GERD, BPH, and possible dementia who presents with subacute dizziness vs vertigo.    Of note, he is not a good historian and his history varies significantly from ED documentation of his initial story.    He reports he was in his usual state of health until about 6-7 weeks ago when developed intermittent short lived dizziness.  He reports his symptoms would come on occasionally with walking 1-2 blocks and possibly had a room spinning sensation.  Would occur maybe a few times a day and would last a few minutes before resolving spontaneously.  No associated nausea, vomitting, muscle aches, chest pain, palpitations, fevers, chills, blurry vision, ear ringing, ear fullness.  He does note a few episodes of diarrhea and a headache in the last few days.  He has not had any falls or trauma associated.  Due to these symptoms he presented to [facility] ED.    In the ED, he was afebrile with unremarkable vital signs.  Labwork remarkable for AG 15, Ca 8.0, AST 41, downtrending trops from 39 to 30, elevated lactate 2.6, elevated , and negative CT angio head/neck and CT a/p, unremarkable CXR He was given NS 1 L, meclizine 50, reglan 10 IV and admitted for further evaluation.    On evaluation, he gave the above history and that he was not currently feeling any symptoms.  He denies any recent changes in medications. no

## 2019-09-17 NOTE — H&P ADULT - PROBLEM SELECTOR PLAN 1
-Patient reported 6 week history of vertigo versus dizziness  -Symptoms appear to have an exertional component to them  -EKG unremarkable, CT angio head/neck without obvious etiology.  Exam reassuring with no nystagmus and normal neuro exam  -If truly vertigo, considerations include BPPV, labarynthitis, vestibular neuritis etc.  Central pathology with cerebellar infarct seems less likely given normal exam, intermittent symptoms, and prolonged course  -If just dizziness, could be related to medications - possible etiologies: donepezil, silodosin, nifedepine all could cause some element of dizziness.    -Will hold donepezil and silodosin to start  -Consult neurology for assistance in the AM.  Discuss if additional imaging such as MRI w/ w/o contrast would be of assistance.  -PT/OT evaluation  -Check Echo in AM.  Keep on tele.

## 2019-09-17 NOTE — H&P ADULT - PROBLEM SELECTOR PLAN 5
On atorvastatin as outpatient.  CK elevated on arrival.  No symptoms of muscle aches.  -Will hold atorvastatin for now.  Trend CK and consider switching statins.

## 2019-09-17 NOTE — H&P ADULT - PROBLEM SELECTOR PLAN 3
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  Fully oriented and understanding.  Hold donepezil as may cause some dizziness and nonessential medication.

## 2019-09-17 NOTE — ED PROVIDER NOTE - FAMILY HISTORY
Father  Still living? No  Acute myocardial infarction, Age at diagnosis: Age Unknown     Mother  Still living? No  Acute myocardial infarction, Age at diagnosis: Age Unknown

## 2019-09-17 NOTE — H&P ADULT - NSICDXPASTMEDICALHX_GEN_ALL_CORE_FT
PAST MEDICAL HISTORY:  CVA (Cerebral Infarction)     Essential Hypertension     Hyperlipidemia     PU (Peptic Ulcer)     Secondary Seizure Disorder

## 2019-09-17 NOTE — ED PROVIDER NOTE - CLINICAL SUMMARY MEDICAL DECISION MAKING FREE TEXT BOX
78 y.o male pmhx of HTN, HLD coming in with 2 days of generalized weakness/fatigue and a gradual headache that started today with associated dizziness. Currently asymptomatic. Physical exam unremarakable. rectal exam without blood. Will check labs, TSH, troponin, xray chest, CT abdomen/pelvis with IV contrast, CT/CTA of head and neck.

## 2019-09-17 NOTE — H&P ADULT - PROBLEM SELECTOR PLAN 4
On dutasteride and silodosin as outpatient.  Will hold silodosin as frequently causes some orthostatic hypotension  Continue dutasteride.

## 2019-09-17 NOTE — ED PROVIDER NOTE - PMH
CVA (Cerebral Infarction)    Essential Hypertension    Hyperlipidemia    PU (Peptic Ulcer)    Secondary Seizure Disorder

## 2019-09-17 NOTE — ED ADULT NURSE NOTE - OBJECTIVE STATEMENT
Pt A+OX3, accompanied by wife, c/o generalized weakness, poor appetite, sleeping a lot, and nausea x3 days.  CM placed.  EKG done.  Kept NPO.  MD at bedside.  Labs/IV to be done

## 2019-09-17 NOTE — H&P ADULT - NSHPREVIEWOFSYSTEMS_GEN_ALL_CORE
ROS:  Constitutional:  (+) fatigue; (-) fevers, chills, sweats, unintentional weight loss, sick contacts, recent travel, trauma  Ears/Nose/Mouth/Throat: (+) none; (-) throat pain, rhinorrhea, dysphagia/odynophagia  CV: (+) none; (-) chest pain, palpitations, lower extremity edema, orthopnea, PND  Resp: (+) none; (-) shortness of breath, cough  GI: (+) few episodes of diarrhea recently; (-) abdominal pain, nausea, vomitting, constipation, melana, hematochezia  : (+)none ; (-) dysuria, hematuria  MSK: (+) none; (-) joint pain, joint swelling, muscle aches  Skin: (+) none; (-) rash, new yellowing/darkening of skin  Neuro: (+) HA in recent days, generalized weakness, lightheadedness/dizziness, possible vertigo; (-) vision changes, confusion,   Psych: (+) none; (-) depressed mood, anhedonia,   Endo: (+) none; (-) heat/cold intolerance, recent skin/hair/nail changes  Heme/Lymph: (+) none; (-) swollen lymph nodes, night sweats

## 2019-09-17 NOTE — H&P ADULT - PROBLEM SELECTOR PLAN 7
Improve score 1 for age.  SCD for DVT prophylaxis Improve score 1 for age.  SCD for DVT prophylaxis    Patient assigned to my care for initial evaluation and management Improve score 1 for age.  SCD for DVT prophylaxis    Patient assigned to my care for initial evaluation and management.  CAre to be assumed by Dr. Cueto in the AM.

## 2019-09-17 NOTE — H&P ADULT - NSHPLABSRESULTS_GEN_ALL_CORE
Diagnostics reviewed and:  CBC wnl  BMP w/ AG 15, Ca 8.0  LFT w/ AST 41  UA w/ 6-10 WBC but otherwise unremarkable  FOBT negative  Troponin 39 decreasing to 30 over 3 hours  Lactate on VBG 2.6   iumproving to 464  TSH and lipase wnl  CXR personally reviewed and left hilar prominence, some increased vascular markings but otherwise appears clear  CT angio head/neck and CT a/p without significant findings noted on read  EKG personally reviewed and NSR w/ PVC but otherwise no ST-T changes

## 2019-09-17 NOTE — ED PROVIDER NOTE - PROGRESS NOTE DETAILS
Attending MD [employer].  PT signed out to me in stable condition pending trop, lac, CK, CT's hx of HTN, HLD, here with gen'l weakness, also nausea, dec PO, h/a this AM, room spinning sensation.  Likely to admit 2/2 significance of weakness.  Will complete work-up and dispo accordingly. CORAL Thompson: Case endorsed to me during evening rounds, pt is pending admission. I discussed the case with Tele doc Dr. Cedeno who accepted the case for admission to Telemetry. Tele PA text paged at this time.

## 2019-09-17 NOTE — H&P ADULT - NSHPPHYSICALEXAM_GEN_ALL_CORE
Physical exam:  Constitutional-Vitals signs reviewed and afebrile, HR 66-78, bp 117-152/68-88, rr 17-20, 96-98% on RA, awake, alert, not in acute distress  Neuro-awake, alert, oriented to person, place, year, month, day of week, situation, president, 5/5 strength on bilateral , elbow flexion/extension, shoulder abd/adduction, hip flexion,, and knee extension, plantar/dorsiflexion, face symmetric, CN intact, no nystagmus on EOMI, FNF intact without dysmetria  Eyes-pupils equally round and reactive to light, non icteric, no discharge noted  Ears, nose, mouth, throat-no abnormal discharge, moist mucous membranes, oropharynx clear without noted exudate.  No tenderness over temporal arteries  CV-regular rate and rhythm, no rubs, murmurs, gallops appreciated, occasional ectopy, no lower extremity edema, palpable distal pulses (radial, dorsalis pedis, posterior tibial)  Resp-clear to auscultation bilaterally, normal respiratory effort,   GI-abdomen soft and nontender, no rebound or guarding present  -not examined  MSK-normal range of motion of bilateral elbows and knees, no obvious deformities   Skin-warm, dry, no rash appreciated  Psych-calm, cooperative, normal affect, not attending to internal stimuli

## 2019-09-17 NOTE — H&P ADULT - NSICDXPASTSURGICALHX_GEN_ALL_CORE_FT
PAST SURGICAL HISTORY:  Nephrolithiasis s/p URS stone extraction 2012    Status Post Inguinal Hernia Repair

## 2019-09-18 LAB
24R-OH-CALCIDIOL SERPL-MCNC: 14.3 NG/ML — LOW (ref 30–80)
ALBUMIN SERPL ELPH-MCNC: 3.7 G/DL — SIGNIFICANT CHANGE UP (ref 3.3–5)
ALBUMIN SERPL ELPH-MCNC: 3.8 G/DL — SIGNIFICANT CHANGE UP (ref 3.3–5)
ALP SERPL-CCNC: 71 U/L — SIGNIFICANT CHANGE UP (ref 40–120)
ALP SERPL-CCNC: 79 U/L — SIGNIFICANT CHANGE UP (ref 40–120)
ALT FLD-CCNC: 36 U/L — SIGNIFICANT CHANGE UP (ref 4–41)
ALT FLD-CCNC: 38 U/L — SIGNIFICANT CHANGE UP (ref 4–41)
ANION GAP SERPL CALC-SCNC: 12 MMO/L — SIGNIFICANT CHANGE UP (ref 7–14)
ANION GAP SERPL CALC-SCNC: 13 MMO/L — SIGNIFICANT CHANGE UP (ref 7–14)
ANION GAP SERPL CALC-SCNC: 17 MMO/L — HIGH (ref 7–14)
AST SERPL-CCNC: 35 U/L — SIGNIFICANT CHANGE UP (ref 4–40)
AST SERPL-CCNC: 42 U/L — HIGH (ref 4–40)
BILIRUB DIRECT SERPL-MCNC: 0.2 MG/DL — SIGNIFICANT CHANGE UP (ref 0.1–0.2)
BILIRUB SERPL-MCNC: 0.7 MG/DL — SIGNIFICANT CHANGE UP (ref 0.2–1.2)
BILIRUB SERPL-MCNC: 0.9 MG/DL — SIGNIFICANT CHANGE UP (ref 0.2–1.2)
BUN SERPL-MCNC: 15 MG/DL — SIGNIFICANT CHANGE UP (ref 7–23)
BUN SERPL-MCNC: 16 MG/DL — SIGNIFICANT CHANGE UP (ref 7–23)
BUN SERPL-MCNC: 19 MG/DL — SIGNIFICANT CHANGE UP (ref 7–23)
CALCIUM SERPL-MCNC: 7 MG/DL — LOW (ref 8.4–10.5)
CALCIUM SERPL-MCNC: 7.1 MG/DL — LOW (ref 8.4–10.5)
CALCIUM SERPL-MCNC: 7.4 MG/DL — LOW (ref 8.4–10.5)
CHLORIDE SERPL-SCNC: 101 MMOL/L — SIGNIFICANT CHANGE UP (ref 98–107)
CHLORIDE SERPL-SCNC: 102 MMOL/L — SIGNIFICANT CHANGE UP (ref 98–107)
CHLORIDE SERPL-SCNC: 103 MMOL/L — SIGNIFICANT CHANGE UP (ref 98–107)
CK SERPL-CCNC: 350 U/L — HIGH (ref 30–200)
CK SERPL-CCNC: 442 U/L — HIGH (ref 30–200)
CO2 SERPL-SCNC: 24 MMOL/L — SIGNIFICANT CHANGE UP (ref 22–31)
CO2 SERPL-SCNC: 25 MMOL/L — SIGNIFICANT CHANGE UP (ref 22–31)
CO2 SERPL-SCNC: 26 MMOL/L — SIGNIFICANT CHANGE UP (ref 22–31)
CREAT SERPL-MCNC: 0.85 MG/DL — SIGNIFICANT CHANGE UP (ref 0.5–1.3)
CREAT SERPL-MCNC: 0.94 MG/DL — SIGNIFICANT CHANGE UP (ref 0.5–1.3)
CREAT SERPL-MCNC: 1.02 MG/DL — SIGNIFICANT CHANGE UP (ref 0.5–1.3)
ERYTHROCYTE [SEDIMENTATION RATE] IN BLOOD: 7 MM/HR — SIGNIFICANT CHANGE UP (ref 1–15)
GLUCOSE SERPL-MCNC: 89 MG/DL — SIGNIFICANT CHANGE UP (ref 70–99)
GLUCOSE SERPL-MCNC: 89 MG/DL — SIGNIFICANT CHANGE UP (ref 70–99)
GLUCOSE SERPL-MCNC: 90 MG/DL — SIGNIFICANT CHANGE UP (ref 70–99)
MAGNESIUM SERPL-MCNC: 0.7 MG/DL — CRITICAL LOW (ref 1.6–2.6)
MAGNESIUM SERPL-MCNC: 0.9 MG/DL — CRITICAL LOW (ref 1.6–2.6)
MAGNESIUM SERPL-MCNC: 1.5 MG/DL — LOW (ref 1.6–2.6)
PHOSPHATE SERPL-MCNC: 2.9 MG/DL — SIGNIFICANT CHANGE UP (ref 2.5–4.5)
PHOSPHATE SERPL-MCNC: 3 MG/DL — SIGNIFICANT CHANGE UP (ref 2.5–4.5)
PHOSPHATE SERPL-MCNC: 3.5 MG/DL — SIGNIFICANT CHANGE UP (ref 2.5–4.5)
POTASSIUM SERPL-MCNC: 3.2 MMOL/L — LOW (ref 3.5–5.3)
POTASSIUM SERPL-MCNC: 3.7 MMOL/L — SIGNIFICANT CHANGE UP (ref 3.5–5.3)
POTASSIUM SERPL-MCNC: 4 MMOL/L — SIGNIFICANT CHANGE UP (ref 3.5–5.3)
POTASSIUM SERPL-SCNC: 3.2 MMOL/L — LOW (ref 3.5–5.3)
POTASSIUM SERPL-SCNC: 3.7 MMOL/L — SIGNIFICANT CHANGE UP (ref 3.5–5.3)
POTASSIUM SERPL-SCNC: 4 MMOL/L — SIGNIFICANT CHANGE UP (ref 3.5–5.3)
PROT SERPL-MCNC: 6.8 G/DL — SIGNIFICANT CHANGE UP (ref 6–8.3)
PROT SERPL-MCNC: 7.2 G/DL — SIGNIFICANT CHANGE UP (ref 6–8.3)
PTH-INTACT SERPL-MCNC: 33.34 PG/ML — SIGNIFICANT CHANGE UP (ref 15–65)
SODIUM SERPL-SCNC: 140 MMOL/L — SIGNIFICANT CHANGE UP (ref 135–145)
SODIUM SERPL-SCNC: 141 MMOL/L — SIGNIFICANT CHANGE UP (ref 135–145)
SODIUM SERPL-SCNC: 142 MMOL/L — SIGNIFICANT CHANGE UP (ref 135–145)

## 2019-09-18 PROCEDURE — 93306 TTE W/DOPPLER COMPLETE: CPT | Mod: 26

## 2019-09-18 RX ORDER — MAGNESIUM OXIDE 400 MG ORAL TABLET 241.3 MG
400 TABLET ORAL
Refills: 0 | Status: COMPLETED | OUTPATIENT
Start: 2019-09-18 | End: 2019-09-19

## 2019-09-18 RX ORDER — CALCIUM CARBONATE 500(1250)
2 TABLET ORAL
Refills: 0 | Status: DISCONTINUED | OUTPATIENT
Start: 2019-09-18 | End: 2019-09-20

## 2019-09-18 RX ORDER — MAGNESIUM SULFATE 500 MG/ML
1 VIAL (ML) INJECTION ONCE
Refills: 0 | Status: COMPLETED | OUTPATIENT
Start: 2019-09-18 | End: 2019-09-18

## 2019-09-18 RX ORDER — INFLUENZA VIRUS VACCINE 15; 15; 15; 15 UG/.5ML; UG/.5ML; UG/.5ML; UG/.5ML
0.5 SUSPENSION INTRAMUSCULAR ONCE
Refills: 0 | Status: DISCONTINUED | OUTPATIENT
Start: 2019-09-18 | End: 2019-09-21

## 2019-09-18 RX ORDER — MAGNESIUM SULFATE 500 MG/ML
2 VIAL (ML) INJECTION ONCE
Refills: 0 | Status: COMPLETED | OUTPATIENT
Start: 2019-09-18 | End: 2019-09-18

## 2019-09-18 RX ORDER — ERGOCALCIFEROL 1.25 MG/1
50000 CAPSULE ORAL
Refills: 0 | Status: DISCONTINUED | OUTPATIENT
Start: 2019-09-18 | End: 2019-09-21

## 2019-09-18 RX ORDER — POTASSIUM CHLORIDE 20 MEQ
40 PACKET (EA) ORAL EVERY 4 HOURS
Refills: 0 | Status: COMPLETED | OUTPATIENT
Start: 2019-09-18 | End: 2019-09-18

## 2019-09-18 RX ADMIN — Medication 81 MILLIGRAM(S): at 12:08

## 2019-09-18 RX ADMIN — Medication 40 MILLIEQUIVALENT(S): at 17:59

## 2019-09-18 RX ADMIN — Medication 40 MILLIEQUIVALENT(S): at 13:59

## 2019-09-18 RX ADMIN — MAGNESIUM OXIDE 400 MG ORAL TABLET 400 MILLIGRAM(S): 241.3 TABLET ORAL at 00:52

## 2019-09-18 RX ADMIN — Medication 2 TABLET(S): at 17:59

## 2019-09-18 RX ADMIN — Medication 100 GRAM(S): at 00:52

## 2019-09-18 RX ADMIN — MAGNESIUM OXIDE 400 MG ORAL TABLET 400 MILLIGRAM(S): 241.3 TABLET ORAL at 10:06

## 2019-09-18 RX ADMIN — MAGNESIUM OXIDE 400 MG ORAL TABLET 400 MILLIGRAM(S): 241.3 TABLET ORAL at 13:47

## 2019-09-18 RX ADMIN — Medication 50 GRAM(S): at 11:52

## 2019-09-18 RX ADMIN — PANTOPRAZOLE SODIUM 40 MILLIGRAM(S): 20 TABLET, DELAYED RELEASE ORAL at 07:13

## 2019-09-18 RX ADMIN — MAGNESIUM OXIDE 400 MG ORAL TABLET 400 MILLIGRAM(S): 241.3 TABLET ORAL at 17:59

## 2019-09-18 RX ADMIN — LISINOPRIL 20 MILLIGRAM(S): 2.5 TABLET ORAL at 07:13

## 2019-09-18 RX ADMIN — FINASTERIDE 5 MILLIGRAM(S): 5 TABLET, FILM COATED ORAL at 11:52

## 2019-09-18 RX ADMIN — Medication 90 MILLIGRAM(S): at 07:13

## 2019-09-18 RX ADMIN — ERGOCALCIFEROL 50000 UNIT(S): 1.25 CAPSULE ORAL at 18:34

## 2019-09-18 NOTE — OCCUPATIONAL THERAPY INITIAL EVALUATION ADULT - PERTINENT HX OF CURRENT PROBLEM, REHAB EVAL
Pt is a 78 year old male with hx of HTN, HLD, kidney stones, GERD, BPH, and possible dementia, who presented to [facility] on 9/17/19 with dizziness. CT Angio Head with IV Contrast on 9/17/19 displayed no intracranial hemorrhage mass or shift. No major vessel stenosis or occlusion. Hypoplastic right V4 segment. The basilar is patent. Fetal origin of the left PCA from the left internal carotid artery, normal anatomic variation.

## 2019-09-18 NOTE — CONSULT NOTE ADULT - ASSESSMENT
78 year old man with a history of HTN, HLD, kidney stones, GERD, BPH, and possible dementia who presents with subacute dizziness vs vertigo.  He reports he was in his usual state of health until about 6-7 weeks ago when developed intermittent short lived dizziness.  He reports his symptoms would come on occasionally with walking 1-2 blocks and possibly had a room spinning sensation.  Would occur maybe a few times a day and would last a few minutes before resolving spontaneously.  No associated nausea, vomitting, muscle aches, chest pain, palpitations, fevers, chills, blurry vision, ear ringing, ear fullness.  He does note a few episodes of diarrhea and a headache in the last few days.  He has not had any falls or trauma associated.  Due to these symptoms he presented to [facility] ED.    In the ED, he was afebrile with unremarkable vital signs.  Labwork remarkable for AG 15, Ca 8.0, AST 41, downtrending trops from 39 to 30, elevated lactate 2.6, elevated , and negative CT angio head/neck and CT a/p, unremarkable CXR He was given NS 1 L, meclizine 50, reglan 10 IV and admitted for further evaluation.      Per patient and family ,feeling better.     78 year old man with a history of HTN, HLD, kidney stones, GERD, BPH, and possible dementia who presents with subacute dizziness vs vertigo.     Problem/Plan - 1:  ·  Problem: Vertigo.  Plan: -Resolved. CT ,CTA noted .   -TTE pending.       Problem/Plan - 2:  ·  Problem: Essential Hypertension.  Plan: BP readings .  Continue home nifedipine and lisinopril for BP management.      Problem/Plan - 3:  ·  Problem: Mild cognitive impairment.  Plan: On donepezil as outpatient for presumed MCI.  Fully oriented and understanding.  Hold donepezil as may cause some dizziness and nonessential medication.      Problem/Plan - 4:  ·  Problem: Benign prostatic hyperplasia without lower urinary tract symptoms.  Plan: On dutasteride and silodosin as outpatient.  Will hold silodosin as frequently causes some orthostatic hypotension  Continue dutasteride.      Problem/Plan - 5:  ·  Problem: Hyperlipidemia, unspecified hyperlipidemia type.  Plan: On atorvastatin as outpatient.  CK elevated on arrival.  No symptoms of muscle aches.  -Will hold atorvastatin for now.  Trend CK and consider switching statins.      Problem/Plan - 6:  Problem: Elevated CK. Plan: Possibly related to statins.  No symptoms of myopathy.  Hold atorvastatin for now.     Problem/Plan - 7:  ·  Problem: Need for prophylactic measure.  Plan: Improve score 1 for age.  SCD for DVT prophylaxis

## 2019-09-18 NOTE — PATIENT PROFILE ADULT - NSASFALLATTEMPTOOB_GEN_A_NUR
Reason For Visit  AIAD SUTHERLAND is here today for a nurse visit for immunizations ADMINISTERED TDAP AND MMR BOOSTERS.      Current Meds   1. Advair Diskus 250-50 MCG/DOSE Inhalation Aerosol Powder Breath Activated; INHALE 1   PUFF BY INHALATION ROUTE 2 TIMES PER DAY IN THE MORNING AND   EVENING APPROXIMATELY 12 HOURS APART;   Therapy: 25Nov2014 to (Evaluate:[id number])  Requested for: 28Mar2017; Last   Rx:28Mar2017 Ordered    Allergies  No Known Drug Allergies    Signatures   Electronically signed by : MARSHA Chris; May 10 2017  9:06AM CST    
no

## 2019-09-18 NOTE — OCCUPATIONAL THERAPY INITIAL EVALUATION ADULT - LEVEL OF INDEPENDENCE: SIT/SUPINE, REHAB EVAL
Not assessed. Pt left sitting as received. No acute distress. Call bell in reach. All lines intact and precautions maintained. RN, made aware.

## 2019-09-18 NOTE — OCCUPATIONAL THERAPY INITIAL EVALUATION ADULT - LIVES WITH, PROFILE
Pt. reports he lives with his wife in a house with 5 steps to enter. Once inside, pt. reports bedroom and bathroom are located on the main level. Per pt., he has a bathtub in his bathroom.

## 2019-09-18 NOTE — OCCUPATIONAL THERAPY INITIAL EVALUATION ADULT - PLANNED THERAPY INTERVENTIONS, OT EVAL
balance training/ROM/neuromuscular re-education/ADL retraining/bed mobility training/strengthening/transfer training

## 2019-09-18 NOTE — CONSULT NOTE ADULT - ASSESSMENT
78 year old man with a history of HTN, HLD, kidney stones, GERD, BPH, and possible dementia who presents with subacute dizziness vs vertigo since sat.    Hypokalemia  likely sec to hypomagnesemia sec to Protonix  hx of PUD and GERD will need to continue protonix  supplement mg and K  monitor bmp  possible will need daily mg supplement    Hypomagnesemia  supplemented  monitor    hypocalcemia  vit d 14.3  check pth  supplement vitd 50000 x8 dose    HTN  controlled  resume home dose  monitor 78 year old man with a history of HTN, HLD, kidney stones, GERD, BPH, and possible dementia who presents with subacute dizziness vs vertigo since sat.    Hypokalemia  likely sec to hypomagnesemia sec to Protonix  hx of PUD and GERD will need to continue protonix  supplement mg and K  monitor bmp  possible will need daily oral mg supplement    Hypomagnesemia  Serum mag low in the setting of PPI   supplemented IV today  possible will need daily oral mg supplement if PPI is continued   monitor    Hypocalcemia  vit d low at 14.3  PTH 33   supplement vit d 89295 once weekly x8 doses    HTN  controlled  resume home dose  monitor

## 2019-09-18 NOTE — CONSULT NOTE ADULT - ASSESSMENT
ekg - NSR PVC     a/p     1) Dizziness - sounds like vertigo, will get neuro consult, vertigo improved, will get 2d echo , check orthostatics    2) Hypocalcemia - will get endocrine consult     3) HTN - cont nifedipine and lisinopril

## 2019-09-18 NOTE — OCCUPATIONAL THERAPY INITIAL EVALUATION ADULT - MD ORDER
Occupational Therapy (OT) to evaluate and treat. Occupational Therapy (OT) to evaluate and treat. Ambulate with assistance. Per ZURDO MANCIA, pt is okay to participate in OT evaluation and perform activity as tolerated.

## 2019-09-18 NOTE — OCCUPATIONAL THERAPY INITIAL EVALUATION ADULT - GENERAL OBSERVATIONS, REHAB EVAL
Pt. received sitting at edge of bed. No acute distress. Patient agreed to evaluation from Occupational Therapist. +Tele. Wife and daughter bedside.

## 2019-09-18 NOTE — PATIENT PROFILE ADULT - FALLEN IN THE PAST
The following are discharge urology instructions given to patient today, same-day surgery =    Bee Deleon M.D.    1.  For burning pain with urination, phenazopyridine 200 mg capsule every 8 hours as needed for pain.   Changes yes

## 2019-09-19 DIAGNOSIS — E78.49 OTHER HYPERLIPIDEMIA: ICD-10-CM

## 2019-09-19 DIAGNOSIS — E83.51 HYPOCALCEMIA: ICD-10-CM

## 2019-09-19 LAB
ALBUMIN SERPL ELPH-MCNC: 3.7 G/DL — SIGNIFICANT CHANGE UP (ref 3.3–5)
ALP SERPL-CCNC: 80 U/L — SIGNIFICANT CHANGE UP (ref 40–120)
ALT FLD-CCNC: 36 U/L — SIGNIFICANT CHANGE UP (ref 4–41)
ANION GAP SERPL CALC-SCNC: 13 MMO/L — SIGNIFICANT CHANGE UP (ref 7–14)
AST SERPL-CCNC: 34 U/L — SIGNIFICANT CHANGE UP (ref 4–40)
BACTERIA UR CULT: SIGNIFICANT CHANGE UP
BASOPHILS # BLD AUTO: 0.03 K/UL — SIGNIFICANT CHANGE UP (ref 0–0.2)
BASOPHILS NFR BLD AUTO: 0.6 % — SIGNIFICANT CHANGE UP (ref 0–2)
BILIRUB SERPL-MCNC: 0.7 MG/DL — SIGNIFICANT CHANGE UP (ref 0.2–1.2)
BUN SERPL-MCNC: 16 MG/DL — SIGNIFICANT CHANGE UP (ref 7–23)
CALCIUM SERPL-MCNC: 7.6 MG/DL — LOW (ref 8.4–10.5)
CHLORIDE SERPL-SCNC: 103 MMOL/L — SIGNIFICANT CHANGE UP (ref 98–107)
CO2 SERPL-SCNC: 26 MMOL/L — SIGNIFICANT CHANGE UP (ref 22–31)
CREAT SERPL-MCNC: 0.82 MG/DL — SIGNIFICANT CHANGE UP (ref 0.5–1.3)
EOSINOPHIL # BLD AUTO: 0.07 K/UL — SIGNIFICANT CHANGE UP (ref 0–0.5)
EOSINOPHIL NFR BLD AUTO: 1.4 % — SIGNIFICANT CHANGE UP (ref 0–6)
GLUCOSE SERPL-MCNC: 94 MG/DL — SIGNIFICANT CHANGE UP (ref 70–99)
HCT VFR BLD CALC: 42.4 % — SIGNIFICANT CHANGE UP (ref 39–50)
HGB BLD-MCNC: 13.5 G/DL — SIGNIFICANT CHANGE UP (ref 13–17)
IMM GRANULOCYTES NFR BLD AUTO: 0.4 % — SIGNIFICANT CHANGE UP (ref 0–1.5)
LYMPHOCYTES # BLD AUTO: 1.15 K/UL — SIGNIFICANT CHANGE UP (ref 1–3.3)
LYMPHOCYTES # BLD AUTO: 22.3 % — SIGNIFICANT CHANGE UP (ref 13–44)
MAGNESIUM SERPL-MCNC: 1.8 MG/DL — SIGNIFICANT CHANGE UP (ref 1.6–2.6)
MCHC RBC-ENTMCNC: 27.9 PG — SIGNIFICANT CHANGE UP (ref 27–34)
MCHC RBC-ENTMCNC: 31.8 % — LOW (ref 32–36)
MCV RBC AUTO: 87.6 FL — SIGNIFICANT CHANGE UP (ref 80–100)
MONOCYTES # BLD AUTO: 0.8 K/UL — SIGNIFICANT CHANGE UP (ref 0–0.9)
MONOCYTES NFR BLD AUTO: 15.5 % — HIGH (ref 2–14)
NEUTROPHILS # BLD AUTO: 3.09 K/UL — SIGNIFICANT CHANGE UP (ref 1.8–7.4)
NEUTROPHILS NFR BLD AUTO: 59.8 % — SIGNIFICANT CHANGE UP (ref 43–77)
NRBC # FLD: 0 K/UL — SIGNIFICANT CHANGE UP (ref 0–0)
PHOSPHATE SERPL-MCNC: 3.2 MG/DL — SIGNIFICANT CHANGE UP (ref 2.5–4.5)
PLATELET # BLD AUTO: 163 K/UL — SIGNIFICANT CHANGE UP (ref 150–400)
PMV BLD: 10.4 FL — SIGNIFICANT CHANGE UP (ref 7–13)
POTASSIUM SERPL-MCNC: 3.5 MMOL/L — SIGNIFICANT CHANGE UP (ref 3.5–5.3)
POTASSIUM SERPL-SCNC: 3.5 MMOL/L — SIGNIFICANT CHANGE UP (ref 3.5–5.3)
PROT SERPL-MCNC: 7.1 G/DL — SIGNIFICANT CHANGE UP (ref 6–8.3)
PTH-INTACT SERPL-MCNC: 118.1 PG/ML — HIGH (ref 15–65)
RBC # BLD: 4.84 M/UL — SIGNIFICANT CHANGE UP (ref 4.2–5.8)
RBC # FLD: 13.2 % — SIGNIFICANT CHANGE UP (ref 10.3–14.5)
SODIUM SERPL-SCNC: 142 MMOL/L — SIGNIFICANT CHANGE UP (ref 135–145)
SPECIMEN SOURCE: SIGNIFICANT CHANGE UP
WBC # BLD: 5.16 K/UL — SIGNIFICANT CHANGE UP (ref 3.8–10.5)
WBC # FLD AUTO: 5.16 K/UL — SIGNIFICANT CHANGE UP (ref 3.8–10.5)

## 2019-09-19 PROCEDURE — 70551 MRI BRAIN STEM W/O DYE: CPT | Mod: 26

## 2019-09-19 PROCEDURE — 99223 1ST HOSP IP/OBS HIGH 75: CPT | Mod: GC

## 2019-09-19 PROCEDURE — 70549 MR ANGIOGRAPH NECK W/O&W/DYE: CPT | Mod: 26

## 2019-09-19 PROCEDURE — 70544 MR ANGIOGRAPHY HEAD W/O DYE: CPT | Mod: 26,59

## 2019-09-19 RX ORDER — SODIUM CHLORIDE 9 MG/ML
1000 INJECTION INTRAMUSCULAR; INTRAVENOUS; SUBCUTANEOUS
Refills: 0 | Status: DISCONTINUED | OUTPATIENT
Start: 2019-09-19 | End: 2019-09-21

## 2019-09-19 RX ORDER — ATORVASTATIN CALCIUM 80 MG/1
40 TABLET, FILM COATED ORAL AT BEDTIME
Refills: 0 | Status: DISCONTINUED | OUTPATIENT
Start: 2019-09-19 | End: 2019-09-21

## 2019-09-19 RX ORDER — MAGNESIUM SULFATE 500 MG/ML
1 VIAL (ML) INJECTION ONCE
Refills: 0 | Status: COMPLETED | OUTPATIENT
Start: 2019-09-19 | End: 2019-09-19

## 2019-09-19 RX ORDER — SENNA PLUS 8.6 MG/1
2 TABLET ORAL AT BEDTIME
Refills: 0 | Status: DISCONTINUED | OUTPATIENT
Start: 2019-09-19 | End: 2019-09-21

## 2019-09-19 RX ADMIN — Medication 2 TABLET(S): at 06:32

## 2019-09-19 RX ADMIN — Medication 81 MILLIGRAM(S): at 11:24

## 2019-09-19 RX ADMIN — Medication 90 MILLIGRAM(S): at 06:32

## 2019-09-19 RX ADMIN — ATORVASTATIN CALCIUM 40 MILLIGRAM(S): 80 TABLET, FILM COATED ORAL at 21:33

## 2019-09-19 RX ADMIN — SODIUM CHLORIDE 75 MILLILITER(S): 9 INJECTION INTRAMUSCULAR; INTRAVENOUS; SUBCUTANEOUS at 11:29

## 2019-09-19 RX ADMIN — SENNA PLUS 2 TABLET(S): 8.6 TABLET ORAL at 21:33

## 2019-09-19 RX ADMIN — MAGNESIUM OXIDE 400 MG ORAL TABLET 400 MILLIGRAM(S): 241.3 TABLET ORAL at 11:25

## 2019-09-19 RX ADMIN — Medication 100 GRAM(S): at 02:36

## 2019-09-19 RX ADMIN — MAGNESIUM OXIDE 400 MG ORAL TABLET 400 MILLIGRAM(S): 241.3 TABLET ORAL at 17:10

## 2019-09-19 RX ADMIN — FINASTERIDE 5 MILLIGRAM(S): 5 TABLET, FILM COATED ORAL at 11:25

## 2019-09-19 RX ADMIN — MAGNESIUM OXIDE 400 MG ORAL TABLET 400 MILLIGRAM(S): 241.3 TABLET ORAL at 08:48

## 2019-09-19 RX ADMIN — LISINOPRIL 20 MILLIGRAM(S): 2.5 TABLET ORAL at 06:32

## 2019-09-19 RX ADMIN — PANTOPRAZOLE SODIUM 40 MILLIGRAM(S): 20 TABLET, DELAYED RELEASE ORAL at 06:32

## 2019-09-19 RX ADMIN — Medication 2 TABLET(S): at 17:10

## 2019-09-19 NOTE — PHYSICAL THERAPY INITIAL EVALUATION ADULT - CRITERIA FOR SKILLED THERAPEUTIC INTERVENTIONS
rehab potential/therapy frequency/predicted duration of therapy intervention/functional limitations in following categories/risk reduction/prevention/impairments found/anticipated discharge recommendation

## 2019-09-19 NOTE — CHART NOTE - NSCHARTNOTEFT_GEN_A_CORE
Patient's wife complaining that patient has blood on sheets for past few days following bowel movements. Patient states the blood is minimal, there is no blood in the toilet and his stools are brown. Had recent colonoscopy with Dr. Longoria 1.5 months ago- had polyps removed and pathology came back normal (per patient and patient's wife). NANCY performed- normal sphincter tone, no dipak blood noted, no hemorrhoids. H/H normal. Bleeding likely from anal fissure. Will give bowel regimen. Patient instructed to report further episodes of bleeding or change in stool.     Theologia LUBNA Baker  [id number]

## 2019-09-19 NOTE — PROGRESS NOTE ADULT - ASSESSMENT
ekg - NSR PVC     a/p     1) Dizziness - MRI brain ok, Echo shows grossly normal LV function , orthostatics positive will give IV fluids recheck orthostatics tomorrow    2) Hypocalcemia - f/u  endocrine consult     3) HTN - cont nifedipine and lisinopril

## 2019-09-19 NOTE — PHYSICAL THERAPY INITIAL EVALUATION ADULT - GAIT DEVIATIONS NOTED, PT EVAL
decreased weight-shifting ability/increased time in double stance/decreased stride length/decreased dary/decreased step length

## 2019-09-19 NOTE — PROGRESS NOTE ADULT - SUBJECTIVE AND OBJECTIVE BOX
[facility]      Seen earlier today, and examined.  - Today, patient is without complaints.           *****MEDICATIONS: Current medication reviewed and documented.    MEDICATIONS  (STANDING):  aspirin  chewable 81 milliGRAM(s) Oral daily  calcium carbonate   1250 mG (OsCal) 2 Tablet(s) Oral two times a day  ergocalciferol 64329 Unit(s) Oral <User Schedule>  finasteride 5 milliGRAM(s) Oral daily  influenza   Vaccine 0.5 milliLiter(s) IntraMuscular once  lisinopril 20 milliGRAM(s) Oral daily  magnesium oxide 400 milliGRAM(s) Oral three times a day with meals  NIFEdipine XL 90 milliGRAM(s) Oral daily  pantoprazole    Tablet 40 milliGRAM(s) Oral before breakfast  senna 2 Tablet(s) Oral at bedtime  sodium chloride 0.9%. 1000 milliLiter(s) (75 mL/Hr) IV Continuous <Continuous>    MEDICATIONS  (PRN):  meclizine 25 milliGRAM(s) Oral every 8 hours PRN Dizziness          ***** VITAL SIGNS:  T(F): 98 (19 @ 14:09), Max: 99.9 (19 @ 21:16)  HR: 69 (19 @ 14:09) (69 - 91)  BP: 130/90 (19 @ 14:09) (119/76 - 134/79)  RR: 18 (19 @ 14:09) (17 - 18)  SpO2: 100% (19 @ 14:09) (95% - 100%)  Wt(kg): --  ,   I&O's Summary           *****PHYSICAL EXAM:Alert oriented x 2  Attention comprehension are fair. Able to name, repeat,  without any difficulty.   Able to follow 2 step commands.     EOMI fundi not visualized,   blinks to threat b/l   No facial asymmetry   Tongue is midline   Palate elevates symmetrically   Moving all 4 ext     Gait : unsteady   able to stand with assistance only   swaying   B/L down going toes            *****LAB AND IMAGIN.5   5.16  )-----------( 163      ( 19 Sep 2019 06:05 )             42.4                   142  |  103  |  16  ----------------------------<  94  3.5   |  26  |  0.82    Ca    7.6<L>      19 Sep 2019 06:05  Phos  3.2       Mg     1.8         TPro  7.1  /  Alb  3.7  /  TBili  0.7  /  DBili  x   /  AST  34  /  ALT  36  /  AlkPhos  80      PT/INR - ( 17 Sep 2019 23:16 )   PT: 13.0 SEC;   INR: 1.14          PTT - ( 17 Sep 2019 23:16 )  PTT:22.0 SEC       CARDIAC MARKERS ( 18 Sep 2019 06:00 )  x     / x     / 350 u/L / x     / x      CARDIAC MARKERS ( 17 Sep 2019 23:16 )  x     / x     / 442 u/L / x     / x      CARDIAC MARKERS ( 17 Sep 2019 16:17 )  x     / x     / 464 u/L / x     / x                  Urinalysis Basic - ( 17 Sep 2019 16:35 )    Color: YELLOW / Appearance: TURBID / S.024 / pH: 6.0  Gluc: NEGATIVE / Ketone: NEGATIVE  / Bili: NEGATIVE / Urobili: NORMAL   Blood: NEGATIVE / Protein: 70 / Nitrite: NEGATIVE   Leuk Esterase: NEGATIVE / RBC: 0-2 / WBC 6-10   Sq Epi: FEW / Non Sq Epi: x / Bacteria: NEGATIVE  < from: MR Angio Neck w/wo IV Cont (19 @ 02:00) >    Unchanged volume loss, microvascular disease, lacunar infarcts. No   evidence for any acute new hemorrhage, territorial restricted diffusion,   or midline shift. Basal cisterns remain patent.    Tortuous extra and intracranial vessels likely reflecting hypertension,   fetal left PCA with hypoplastic left P1 segment and left A1 segment and   dominant right A1 segment likely anatomic variant.    No evidence for any hemodynamically significant stenosis at ICA origins   by NASCET criteria.    < from: MR Angio Neck w/wo IV Cont (19 @ 02:00) >  There is a dominant intradural left vertebral artery and smaller although   patent intradural right vertebral artery both give rise to posterior   inferior cerebral arteries with the right vertebral decrease in size   although patent to the vertebrobasilar junction. Basilar artery is   patent, there is a patent dominant right P1 segment with hypoplastic   poorly visualized left P1 segment with fetal left PCA likely anatomic   variation. There arestenoses along the distal PCA branch vessels.     < end of copied text >    < end of copied text >      [All pertinent recent Imaging/Reports reviewed]           *****A S S E S S M E N T   A N D   P L A N :  Excerpt from H&P,Mr. Herron is a 78 year old man with a history of HTN, HLD, kidney stones,   , GERD, BPH, and possible dementia who presents with subacute dizziness vs vertigo.     He reports he was in his usual state of health until about 6-7 weeks ago when developed intermittent short lived dizziness.  He reports his symptoms would come on occasionally with walking 1-2 blocks and possibly had a room spinning sensation.  Would occur maybe a few times a day and would last a few minutes before resolving spontaneously.  No associated nausea, vomitting, muscle aches, chest pain, palpitations, fevers, chills, blurry vision, ear ringing, ear fullness.  He does note a few episodes of diarrhea and a headache in the last few days.  He has not had any falls or trauma associated.  Due to these symptoms he presented to [facility] ED.       Problem/Recommendations 1: vertigo   peripheral    would continue asa/statin lipitor 40 at home,will restart.   check orthostatics   mri brain to r/o any ischemia and mra of cow with unchanged vertebral since  study   no contraindication for necessary cardiac testing.  cardiac w/u underway     pt consult for gait eval  Problem/Recommendations 2: electrolyte abn of unclear etiology   defer to medicine           Thank you for allowing me to participate in the care of this patient. Please do not hesitate to call me if you have any  questions.        ________________  Constance King MD  [facility]  [phone]      30 minutes spent on total encounter; more than 50 % of the visit was  spent counseling about plan of care, compliance to diet/exercise and medication regimen and or  coordinating care by the attending physician.      It is advised that stroke patients follow up with [facility] @ [phone] in 1- 2 weeks.   Others please follow up with Dr. Michael Nissenbaum [phone]

## 2019-09-19 NOTE — PROGRESS NOTE ADULT - SUBJECTIVE AND OBJECTIVE BOX
Sunil Cueto MD  Interventional Cardiology / Advance Heart Failure and Cardiac Transplant Specialist  [place] Office : [phone] [place] [id number]  Tel:   [place] Office : [address] [facility] [address] [address]  Tel: [phone]  Cell : [phone]    Subjective : Pt lying in bed comfortable, not in distress, denies any chest pain or SOB  	  MEDICATIONS:  aspirin  chewable 81 milliGRAM(s) Oral daily  lisinopril 20 milliGRAM(s) Oral daily  NIFEdipine XL 90 milliGRAM(s) Oral daily  meclizine 25 milliGRAM(s) Oral every 8 hours PRN  pantoprazole    Tablet 40 milliGRAM(s) Oral before breakfast  senna 2 Tablet(s) Oral at bedtime  atorvastatin 40 milliGRAM(s) Oral at bedtime  finasteride 5 milliGRAM(s) Oral daily  calcium carbonate   1250 mG (OsCal) 2 Tablet(s) Oral two times a day  ergocalciferol 44420 Unit(s) Oral <User Schedule>  influenza   Vaccine 0.5 milliLiter(s) IntraMuscular once  sodium chloride 0.9%. 1000 milliLiter(s) IV Continuous <Continuous>    PHYSICAL EXAM:  T(C): 36.7 (09-19-19 @ 20:14), Max: 37.7 (09-18-19 @ 21:16)  HR: 73 (09-19-19 @ 20:14) (69 - 87)  BP: 121/74 (09-19-19 @ 20:14) (119/76 - 134/79)  RR: 18 (09-19-19 @ 20:14) (18 - 18)  SpO2: 98% (09-19-19 @ 20:14) (95% - 100%)  Wt(kg): --  I&O's Summary        	  HEENT:   Normal oral mucosa, PERRL, EOMI	  Cardiovascular: Normal S1 S2, No JVD, No murmurs, No edema  Respiratory: Lungs clear to auscultation	  Gastrointestinal:  Soft, Non-tender, + BS	  Extremities: Normal range of motion, No clubbing, cyanosis or edema                                    13.5   5.16  )-----------( 163      ( 19 Sep 2019 06:05 )             42.4     09-19    142  |  103  |  16  ----------------------------<  94  3.5   |  26  |  0.82    Ca    7.6<L>      19 Sep 2019 06:05  Phos  3.2     09-19  Mg     1.8     09-19    TPro  7.1  /  Alb  3.7  /  TBili  0.7  /  DBili  x   /  AST  34  /  ALT  36  /  AlkPhos  80  09-19    proBNP:   Lipid Profile:   HgA1c:   TSH:

## 2019-09-19 NOTE — PHYSICAL THERAPY INITIAL EVALUATION ADULT - PERTINENT HX OF CURRENT PROBLEM, REHAB EVAL
Patient is a 78 year old male who was admitted to [facility] for dizziness. PMH includes HTN, HLD, kidney stones, GERD, BPH

## 2019-09-19 NOTE — PROGRESS NOTE ADULT - SUBJECTIVE AND OBJECTIVE BOX
INTERVAL HPI/OVERNIGHT EVENTS: I feel fine.   Vital Signs Last 24 Hrs  T(C): 36.7 (19 Sep 2019 14:09), Max: 37.7 (18 Sep 2019 21:16)  T(F): 98 (19 Sep 2019 14:09), Max: 99.9 (18 Sep 2019 21:16)  HR: 69 (19 Sep 2019 14:09) (69 - 91)  BP: 130/90 (19 Sep 2019 14:09) (119/76 - 134/79)  BP(mean): --  RR: 18 (19 Sep 2019 14:09) (18 - 18)  SpO2: 100% (19 Sep 2019 14:09) (95% - 100%)  I&O's Summary    MEDICATIONS  (STANDING):  aspirin  chewable 81 milliGRAM(s) Oral daily  atorvastatin 40 milliGRAM(s) Oral at bedtime  calcium carbonate   1250 mG (OsCal) 2 Tablet(s) Oral two times a day  ergocalciferol 84495 Unit(s) Oral <User Schedule>  finasteride 5 milliGRAM(s) Oral daily  influenza   Vaccine 0.5 milliLiter(s) IntraMuscular once  lisinopril 20 milliGRAM(s) Oral daily  magnesium oxide 400 milliGRAM(s) Oral three times a day with meals  NIFEdipine XL 90 milliGRAM(s) Oral daily  pantoprazole    Tablet 40 milliGRAM(s) Oral before breakfast  senna 2 Tablet(s) Oral at bedtime  sodium chloride 0.9%. 1000 milliLiter(s) (75 mL/Hr) IV Continuous <Continuous>    MEDICATIONS  (PRN):  meclizine 25 milliGRAM(s) Oral every 8 hours PRN Dizziness    LABS:                        13.5   5.16  )-----------( 163      ( 19 Sep 2019 06:05 )             42.4         142  |  103  |  16  ----------------------------<  94  3.5   |  26  |  0.82    Ca    7.6<L>      19 Sep 2019 06:05  Phos  3.2       Mg     1.8         TPro  7.1  /  Alb  3.7  /  TBili  0.7  /  DBili  x   /  AST  34  /  ALT  36  /  AlkPhos  80  09-19    PT/INR - ( 17 Sep 2019 23:16 )   PT: 13.0 SEC;   INR: 1.14          PTT - ( 17 Sep 2019 23:16 )  PTT:22.0 SEC  Urinalysis Basic - ( 17 Sep 2019 16:35 )    Color: YELLOW / Appearance: TURBID / S.024 / pH: 6.0  Gluc: NEGATIVE / Ketone: NEGATIVE  / Bili: NEGATIVE / Urobili: NORMAL   Blood: NEGATIVE / Protein: 70 / Nitrite: NEGATIVE   Leuk Esterase: NEGATIVE / RBC: 0-2 / WBC 6-10   Sq Epi: FEW / Non Sq Epi: x / Bacteria: NEGATIVE      CAPILLARY BLOOD GLUCOSE            Urinalysis Basic - ( 17 Sep 2019 16:35 )    Color: YELLOW / Appearance: TURBID / S.024 / pH: 6.0  Gluc: NEGATIVE / Ketone: NEGATIVE  / Bili: NEGATIVE / Urobili: NORMAL   Blood: NEGATIVE / Protein: 70 / Nitrite: NEGATIVE   Leuk Esterase: NEGATIVE / RBC: 0-2 / WBC 6-10   Sq Epi: FEW / Non Sq Epi: x / Bacteria: NEGATIVE      REVIEW OF SYSTEMS:  CONSTITUTIONAL: No fever, weight loss, or fatigue  EYES: No eye pain, visual disturbances, or discharge  ENMT:  No difficulty hearing, tinnitus, vertigo; No sinus or throat pain  NECK: No pain or stiffness  RESPIRATORY: No cough, wheezing, chills or hemoptysis; No shortness of breath  CARDIOVASCULAR: No chest pain, palpitations, dizziness, or leg swelling  GASTROINTESTINAL: No abdominal or epigastric pain. No nausea, vomiting, or hematemesis; No diarrhea or constipation. No melena or hematochezia.  GENITOURINARY: No dysuria, frequency, hematuria, or incontinence  NEUROLOGICAL: No headaches, memory loss, loss of strength, numbness, or tremors    Consultant(s) Notes Reviewed:  [x ] YES  [ ] NO    PHYSICAL EXAM:  GENERAL: NAD, well-groomed, well-developed, not in any distress ,  HEAD:  Atraumatic, Normocephalic  EYES: EOMI, PERRLA, conjunctiva and sclera clear  ENMT: No tonsillar erythema, exudates, or enlargement; Moist mucous membranes, Good dentition, No lesions  NECK: Supple, No JVD, Normal thyroid  NERVOUS SYSTEM:  Alert & Oriented X3, No focal deficit   CHEST/LUNG: Good air entry bilateral with no  rales, rhonchi, wheezing, or rubs  HEART: Regular rate and rhythm; No murmurs, rubs, or gallops  ABDOMEN: Soft, Nontender, Nondistended; Bowel sounds present  EXTREMITIES:  2+ Peripheral Pulses, No clubbing, cyanosis, or edema    Care Discussed with Consultants/Other Providers [ x] YES  [ ] NO

## 2019-09-19 NOTE — PROGRESS NOTE ADULT - ASSESSMENT
78 year old man with a history of HTN, HLD, kidney stones, GERD, BPH, and possible dementia who presents with subacute dizziness vs vertigo.  He reports he was in his usual state of health until about 6-7 weeks ago when developed intermittent short lived dizziness.  He reports his symptoms would come on occasionally with walking 1-2 blocks and possibly had a room spinning sensation.  Would occur maybe a few times a day and would last a few minutes before resolving spontaneously.  No associated nausea, vomitting, muscle aches, chest pain, palpitations, fevers, chills, blurry vision, ear ringing, ear fullness.  He does note a few episodes of diarrhea and a headache in the last few days.  He has not had any falls or trauma associated.  Due to these symptoms he presented to [facility] ED.  In the ED, he was afebrile with unremarkable vital signs.  Labwork remarkable for AG 15, Ca 8.0, AST 41, downtrending trops from 39 to 30, elevated lactate 2.6, elevated , and negative CT angio head/neck and CT a/p, unremarkable CXR He was given NS 1 L, meclizine 50, reglan 10 IV and admitted for further evaluation.         Problem/Plan - 1:  ·  Problem: Vertigo.  Plan: -Resolved. CT ,CTA noted .   -TTE pending .   -MRI and MRA pending.       Problem/Plan - 2:  ·  Problem: Essential Hypertension.  Plan: BP readings .  Continue home nifedipine and lisinopril for BP management.      Problem/Plan - 3:  ·  Problem: Mild cognitive impairment.  Plan: On donepezil as outpatient for presumed MCI.  Fully oriented and understanding.  Hold donepezil as may cause some dizziness and nonessential medication.      Problem/Plan - 4:  ·  Problem: Benign prostatic hyperplasia without lower urinary tract symptoms.  Plan: On dutasteride and Silodosin as outpatient.  Will hold silodosin as frequently causes some orthostatic hypotension  Continue dutasteride.      Problem/Plan - 5:  ·  Problem: Hyperlipidemia, unspecified hyperlipidemia type.  Plan: On atorvastatin as outpatient.  CK elevated on arrival.  No symptoms of muscle aches.  -Will hold atorvastatin for now.  Trend CK and consider switching statins.      Problem/Plan - 6:  Problem: Elevated CK. Plan: Possibly related to statins.  No symptoms of myopathy.  Hold atorvastatin for now.     Problem/Plan - 7:  ·  Problem: Need for prophylactic measure.  Plan: Improve score 1 for age.  SCD for DVT prophylaxis

## 2019-09-19 NOTE — PROGRESS NOTE ADULT - SUBJECTIVE AND OBJECTIVE BOX
[facility] [place] [facility]   MD ASHLEY RODARTE, DO JAIMEE CONROY, CORAL AYERS    TEL:  OFFICE: [phone]  DR WILD CELL: [phone]  DR. SEN CELL: [phone]  DR. CONROY CELL: [phone]  ANNABELLE MCLAUGHLIN CELL: [phone]        Patient is a 78y old  Male who presents with a chief complaint of CC: dizziness (18 Sep 2019 16:57)      Patient seen and examined at bedside. No chest pain/sob    VITALS:  T(F): 98.2 (09-19-19 @ 06:30), Max: 99.9 (09-18-19 @ 21:16)  HR: 70 (09-19-19 @ 06:30)  BP: 128/73 (09-19-19 @ 06:30)  RR: 18 (09-19-19 @ 08:26)  SpO2: 96% (09-19-19 @ 08:26)  Wt(kg): --    Height (cm): 177.8 (09-18 @ 17:39)  Weight (kg): 95.7 (09-18 @ 17:39)  BMI (kg/m2): 30.3 (09-18 @ 17:39)  BSA (m2): 2.14 (09-18 @ 17:39)    PHYSICAL EXAM:  Constitutional: NAD  Neck: No JVD  Respiratory: CTAB, no wheezes, rales or rhonchi  Cardiovascular: S1, S2, RRR  Gastrointestinal: BS+, soft, NT/ND  Extremities: No peripheral edema    Hospital Medications:   MEDICATIONS  (STANDING):  aspirin  chewable 81 milliGRAM(s) Oral daily  calcium carbonate   1250 mG (OsCal) 2 Tablet(s) Oral two times a day  ergocalciferol 91006 Unit(s) Oral <User Schedule>  finasteride 5 milliGRAM(s) Oral daily  influenza   Vaccine 0.5 milliLiter(s) IntraMuscular once  lisinopril 20 milliGRAM(s) Oral daily  magnesium oxide 400 milliGRAM(s) Oral three times a day with meals  NIFEdipine XL 90 milliGRAM(s) Oral daily  pantoprazole    Tablet 40 milliGRAM(s) Oral before breakfast  senna 2 Tablet(s) Oral at bedtime  sodium chloride 0.9%. 1000 milliLiter(s) (75 mL/Hr) IV Continuous <Continuous>      LABS:  09-19    142  |  103  |  16  ----------------------------<  94  3.5   |  26  |  0.82    Ca    7.6<L>      19 Sep 2019 06:05  Phos  3.2     09-19  Mg     1.8     09-19    TPro  7.1  /  Alb  3.7  /  TBili  0.7  /  DBili      /  AST  34  /  ALT  36  /  AlkPhos  80  09-19    Creatinine Trend: 0.82 <--, 0.85 <--, 0.94 <--, 1.02 <--, 1.27 <--    Phosphorus Level, Serum: 3.2 mg/dL (09-19 @ 06:05)  Albumin, Serum: 3.7 g/dL (09-19 @ 06:05)  Phosphorus Level, Serum: 3.0 mg/dL (09-18 @ 22:38)  Albumin, Serum: 3.8 g/dL (09-18 @ 22:38)    calcium--  intact pth--  parathyroid hormone intact, [id number]                            13.5   5.16  )-----------( 163      ( 19 Sep 2019 06:05 )             42.4     Urine Studies:  Urinalysis - [09-17-19 @ 16:35]      Color YELLOW / Appearance TURBID / SG 1.024 / pH 6.0      Gluc NEGATIVE / Ketone NEGATIVE  / Bili NEGATIVE / Urobili NORMAL       Blood NEGATIVE / Protein 70 / Leuk Est NEGATIVE / Nitrite NEGATIVE      RBC 0-2 / WBC 6-10 / Hyaline 1+ / Gran  / Sq Epi FEW / Non Sq Epi  / Bacteria NEGATIVE      .1 (Ca --)      [09-19-19 @ 06:05]   --  PTH 33.34 (Ca --)      [09-17-19 @ 23:16]   --  Vitamin D (25OH) 14.3      [09-17-19 @ 23:16]  TSH 0.81      [09-17-19 @ 13:20]        RADIOLOGY & ADDITIONAL STUDIES:

## 2019-09-19 NOTE — CONSULT NOTE ADULT - ASSESSMENT
78 year old man with a history of HTN, HLD, kidney stones, GERD, BPH, and possible dementia who presents with subacute dizziness vs vertigo. Consulted for hypocalcemia.

## 2019-09-19 NOTE — CONSULT NOTE ADULT - SUBJECTIVE AND OBJECTIVE BOX
[facility] [place] [facility]   MD ASHLEY RODARTE DO MARYANNE SOURIAL, DO ANGELA WONG, PA    TEL:  OFFICE: [phone]  DR WILD CELL: [phone]  DR. SEN CELL: [phone]  DR. CONROY CELL: [phone]  ANNABELLE MCLAUGHLIN CELL: [phone]    HPI:  78 year old man with a history of HTN, HLD, kidney stones, GERD, BPH, and possible dementia who presents with subacute dizziness vs vertigo since sat. history supplemented by family at bedside. per wife, patient seems to be increasely weak, + dizziness, + nausea but no vomiting or diarrhea. c/o b/l LE cramps denied chest pain, fever, chills.   nephrology consulted for hypokalemia. patient did admit to have poor appetite for the past few days, recently changed to protonix from other GERD medication by GI    Allergies:  No Known Allergies      PAST MEDICAL & SURGICAL HISTORY:  Hyperlipidemia  Essential Hypertension  PU (Peptic Ulcer)  Secondary Seizure Disorder  CVA (Cerebral Infarction)  Nephrolithiasis: s/p URS stone extraction 2012  Status Post Inguinal Hernia Repair      Home Medications Reviewed    Hospital Medications:   MEDICATIONS  (STANDING):  aspirin  chewable 81 milliGRAM(s) Oral daily  ergocalciferol 19473 Unit(s) Oral <User Schedule>  finasteride 5 milliGRAM(s) Oral daily  lisinopril 20 milliGRAM(s) Oral daily  magnesium oxide 400 milliGRAM(s) Oral three times a day with meals  NIFEdipine XL 90 milliGRAM(s) Oral daily  pantoprazole    Tablet 40 milliGRAM(s) Oral before breakfast  potassium chloride    Tablet ER 40 milliEquivalent(s) Oral every 4 hours      SOCIAL HISTORY:  Denies ETOh, Smoking,     FAMILY HISTORY:  Acute myocardial infarction      REVIEW OF SYSTEMS:  CONSTITUTIONAL: No weakness, fevers or chills  EYES/ENT: No visual changes;  +vertigo   NECK: No pain or stiffness  RESPIRATORY: No cough, wheezing, hemoptysis; No shortness of breath  CARDIOVASCULAR: No chest pain or palpitations.  GASTROINTESTINAL: No abdominal or epigastric pain. No nausea, vomiting, or hematemesis; No diarrhea or constipation. No melena or hematochezia.  GENITOURINARY: No dysuria, frequency, foamy urine, urinary urgency, incontinence or hematuria  NEUROLOGICAL: see hpi  SKIN: No itching, burning, rashes, or lesions   VASCULAR: No bilateral lower extremity edema.   All other review of systems is negative unless indicated above.    VITALS:  T(F): 98.2 (19 @ 14:30), Max: 99.2 (19 @ 00:48)  HR: 73 (19 @ 14:30)  BP: 127/88 (19 @ 14:30)  RR: 17 (19 @ 14:30)  SpO2: 96% (19 @ 14:30)  Wt(kg): --        PHYSICAL EXAM:  Constitutional: NAD  HEENT: anicteric sclera, oropharynx clear, MMM  Neck: No JVD  Respiratory: CTAB, no wheezes, rales or rhonchi  Cardiovascular: S1, S2, RRR  Gastrointestinal: BS+, soft, NT/ND  Extremities: No cyanosis or clubbing. No peripheral edema  Neurological: A/O x 3, no focal deficits  Psychiatric: Normal mood, normal affect  : No CVA tenderness. No dennis.   Skin: No rashes      LABS:      141  |  103  |  16  ----------------------------<  89  3.2<L>   |  25  |  0.94    Ca    7.0<L>      18 Sep 2019 06:00  Phos  3.5       Mg     0.9         TPro  6.8  /  Alb  3.7  /  TBili  0.9  /  DBili  0.2  /  AST  42<H>  /  ALT  36  /  AlkPhos  71      Creatinine Trend: 0.94 <--, 1.02 <--, 1.27 <--                        14.1   6.21  )-----------( 160      ( 17 Sep 2019 23:16 )             43.3     Urine Studies:  Urinalysis Basic - ( 17 Sep 2019 16:35 )    Color: YELLOW / Appearance: TURBID / S.024 / pH: 6.0  Gluc: NEGATIVE / Ketone: NEGATIVE  / Bili: NEGATIVE / Urobili: NORMAL   Blood: NEGATIVE / Protein: 70 / Nitrite: NEGATIVE   Leuk Esterase: NEGATIVE / RBC: 0-2 / WBC 6-10   Sq Epi: FEW / Non Sq Epi:  / Bacteria: NEGATIVE          RADIOLOGY & ADDITIONAL STUDIES:
HPI:  Mr. Herron is a 78 year old man with a history of HTN, HLD, kidney stones, GERD, BPH, and possible dementia who presents with subacute dizziness vs vertigo.    Endo: Consulted for hypocalcemia. Patient denies having a hx of hypocalcemia, head/neck surgery, bisphosphanate, denosumab injections. Wife at bedside did endorse that he had a hx of vitamin D deficiency but was non compliant with his daily vitamin D supplements. He has not been feeling well with sxs of dizziness, fatigue, nausea and b/l feet and right hand cramps for the past 1-2 months. Calcium was 7.3 with mg 0.7 and 25 vitamin d of 14.3.   EKG sinus with PVC. qtc 453,       PAST MEDICAL & SURGICAL HISTORY:  Hyperlipidemia  Essential Hypertension  PU (Peptic Ulcer)  Secondary Seizure Disorder  CVA (Cerebral Infarction)  Nephrolithiasis: s/p URS stone extraction 2012  Status Post Inguinal Hernia Repair      FAMILY HISTORY:  Acute myocardial infarction      Social History: No hx of tobacco or etoh use.     Outpatient Medications: Home Medications:  aspirin 81 mg oral tablet, chewable: 1 tab(s) orally once a day (17 Sep 2019 21:54)  atorvastatin 40 mg oral tablet: 1 tab(s) orally once a day (17 Sep 2019 21:54)  donepezil 10 mg oral tablet: 1 tab(s) orally 2 times a day (17 Sep 2019 21:54)  dutasteride 0.5 mg oral capsule: 1 cap(s) orally once a day (17 Sep 2019 21:54)  lisinopril 20 mg oral tablet: 1 tab(s) orally once a day (17 Sep 2019 21:54)  NIFEdipine 90 mg oral tablet, extended release: 1 tab(s) orally once a day (17 Sep 2019 21:54)  pantoprazole 40 mg oral delayed release tablet: 1 tab(s) orally once a day (17 Sep 2019 21:54)  Rapaflo 8 mg oral capsule: 1 cap(s) orally once a day (17 Sep 2019 21:54)      MEDICATIONS  (STANDING):  aspirin  chewable 81 milliGRAM(s) Oral daily  atorvastatin 40 milliGRAM(s) Oral at bedtime  calcium carbonate   1250 mG (OsCal) 2 Tablet(s) Oral two times a day  ergocalciferol 84431 Unit(s) Oral <User Schedule>  finasteride 5 milliGRAM(s) Oral daily  influenza   Vaccine 0.5 milliLiter(s) IntraMuscular once  lisinopril 20 milliGRAM(s) Oral daily  magnesium oxide 400 milliGRAM(s) Oral three times a day with meals  NIFEdipine XL 90 milliGRAM(s) Oral daily  pantoprazole    Tablet 40 milliGRAM(s) Oral before breakfast  senna 2 Tablet(s) Oral at bedtime  sodium chloride 0.9%. 1000 milliLiter(s) (75 mL/Hr) IV Continuous <Continuous>    MEDICATIONS  (PRN):  meclizine 25 milliGRAM(s) Oral every 8 hours PRN Dizziness      Allergies    No Known Allergies    Intolerances      Review of Systems:  Constitutional: No fever, chills   Neuro: No tremors. + dizziness   Cardiovascular: No chest pain, palpitations  Respiratory: No SOB, no cough  GI: No nausea, vomiting. +nausea.   : No dysuria, polyuria   Skin: no rash, ulcers   Psych: no depression, anxiety   Endocrine: no polyphagia, polydipsia     ALL OTHER SYSTEMS REVIEWED AND NEGATIVE        PHYSICAL EXAM:  VITALS: T(C): 36.7 (09-19-19 @ 14:09)  T(F): 98 (09-19-19 @ 14:09), Max: 99.9 (09-18-19 @ 21:16)  HR: 69 (09-19-19 @ 14:09) (69 - 91)  BP: 130/90 (09-19-19 @ 14:09) (119/76 - 134/79)  RR:  (18 - 18)  SpO2:  (95% - 100%)  Wt(kg): --  GENERAL: NAD, well-groomed, well-developed  EYES: No proptosis, anicteric  HEENT:  Atraumatic, Normocephalic, moist mucous membranes  THYROID: Normal size, no palpable nodules  RESPIRATORY: Clear to auscultation bilaterally; No rales, rhonchi, wheezing  CARDIOVASCULAR: Regular rate and rhythm; No murmurs; no peripheral edema  GI: Soft, nontender, non distended, normal bowel sounds  SKIN: Dry, intact, No rashes or lesions  NEURO: AOx3, moves all extremities spontaneously. Chvostek sign negative.   PSYCH: Reactive affect, euthymic mood    POCT Blood Glucose.: 111 mg/dL (09-17-19 @ 10:54)                            13.5   5.16  )-----------( 163      ( 19 Sep 2019 06:05 )             42.4       09-19    142  |  103  |  16  ----------------------------<  94  3.5   |  26  |  0.82    EGFR if : 98  EGFR if non : 85    Ca    7.6<L>      09-19  Mg     1.8     09-19  Phos  3.2     09-19    TPro  7.1  /  Alb  3.7  /  TBili  0.7  /  DBili  x   /  AST  34  /  ALT  36  /  AlkPhos  80  09-19      Thyroid Function Tests:  09-17 @ 13:20 TSH 0.81 FreeT4 -- T3 -- Anti TPO -- Anti Thyroglobulin Ab -- TSI --                Radiology:
[facility]), [facility]        Patient is a 78y old  Male who presents with a chief complaint of CC: dizziness (18 Sep 2019 16:57)    Excerpt from H&P,Mr. Herron is a 78 year old man with a history of HTN, HLD, kidney stones, GERD, BPH, and possible dementia who presents with subacute dizziness vs vertigo.  He reports he was in his usual state of health until about 6-7 weeks ago when developed intermittent short lived dizziness.  He reports his symptoms would come on occasionally with walking 1-2 blocks and possibly had a room spinning sensation.  Would occur maybe a few times a day and would last a few minutes before resolving spontaneously.  No associated nausea, vomitting, muscle aches, chest pain, palpitations, fevers, chills, blurry vision, ear ringing, ear fullness.  He does note a few episodes of diarrhea and a headache in the last few days.  He has not had any falls or trauma associated.  Due to these symptoms he presented to [facility] ED.  pt's is sitting in chair and wife is at bedside.   Pt reports that he feels better in terms of the vertigo.              *****PAST MEDICAL / Surgical  HISTORY:  PAST MEDICAL & SURGICAL HISTORY:  Hyperlipidemia  Essential Hypertension  PU (Peptic Ulcer)  Secondary Seizure Disorder  CVA (Cerebral Infarction)  Nephrolithiasis: s/p URS stone extraction 2012  Status Post Inguinal Hernia Repair           *****FAMILY HISTORY:  FAMILY HISTORY:  Acute myocardial infarction           *****SOCIAL HISTORY:  Alcohol: None  Smoking: None         *****ALLERGIES:   Allergies    No Known Allergies    Intolerances             *****MEDICATIONS: current medication reviewed and documented.   MEDICATIONS  (STANDING):  aspirin  chewable 81 milliGRAM(s) Oral daily  calcium carbonate   1250 mG (OsCal) 2 Tablet(s) Oral two times a day  ergocalciferol 55230 Unit(s) Oral <User Schedule>  finasteride 5 milliGRAM(s) Oral daily  influenza   Vaccine 0.5 milliLiter(s) IntraMuscular once  lisinopril 20 milliGRAM(s) Oral daily  magnesium oxide 400 milliGRAM(s) Oral three times a day with meals  NIFEdipine XL 90 milliGRAM(s) Oral daily  pantoprazole    Tablet 40 milliGRAM(s) Oral before breakfast    MEDICATIONS  (PRN):  meclizine 25 milliGRAM(s) Oral every 8 hours PRN Dizziness           *****REVIEW OF SYSTEM:  GEN: no fever, no chills, no pain  RESP: no SOB, no cough, no sputum  CVS: no chest pain, no palpitations, no edema  GI: no abdominal pain, no nausea, no vomiting, no constipation, no diarrhea  : no dysurea, no frequency, no hematurea  Neuro: no headache, no dizziness  PSYCH: no anxiety, no depression  Derm : no itching, no rash         *****VITAL SIGNS:  T(C): 36.8 (19 @ 17:39), Max: 37.3 (19 @ 00:48)  HR: 91 (19 @ 17:39) (73 - 91)  BP: 132/86 (19 @ 17:39) (123/70 - 152/88)  RR: 18 (19 @ 17:39) (16 - 18)  SpO2: 97% (19 @ 17:39) (96% - 99%)  Wt(kg): --           *****PHYSICAL EXAM:   Alert oriented x 2  Attention comprehension are fair. Able to name, repeat,  without any difficulty.   Able to follow 2 step commands.     EOMI fundi not visualized,   blinks to threat b/l   No facial asymmetry   Tongue is midline   Palate elevates symmetrically   Moving all 4 ext     Gait : unsteady   able to stand with assistance only   swaying   B/L down going toes               *****LAB AND IMAGIN.1   6.21  )-----------( 160      ( 17 Sep 2019 23:16 )             43.3                   141  |  103  |  16  ----------------------------<  89  3.2<L>   |  25  |  0.94    Ca    7.0<L>      18 Sep 2019 06:00  Phos  3.5       Mg     0.9         TPro  6.8  /  Alb  3.7  /  TBili  0.9  /  DBili  0.2  /  AST  42<H>  /  ALT  36  /  AlkPhos  71      PT/INR - ( 17 Sep 2019 23:16 )   PT: 13.0 SEC;   INR: 1.14          PTT - ( 17 Sep 2019 23:16 )  PTT:22.0 SEC            CARDIAC MARKERS ( 18 Sep 2019 06:00 )  x     / x     / 350 u/L / x     / x      CARDIAC MARKERS ( 17 Sep 2019 23:16 )  x     / x     / 442 u/L / x     / x      CARDIAC MARKERS ( 17 Sep 2019 16:17 )  x     / x     / 464 u/L / x     / x      CARDIAC MARKERS ( 17 Sep 2019 13:20 )  x     / x     / 554 u/L / x     / x                  Urinalysis Basic - ( 17 Sep 2019 16:35 )    Color: YELLOW / Appearance: TURBID / S.024 / pH: 6.0  Gluc: NEGATIVE / Ketone: NEGATIVE  / Bili: NEGATIVE / Urobili: NORMAL   Blood: NEGATIVE / Protein: 70 / Nitrite: NEGATIVE   Leuk Esterase: NEGATIVE / RBC: 0-2 / WBC 6-10   Sq Epi: FEW / Non Sq Epi: x / Bacteria: NEGATIVE    < from: CT Angio Neck w/ IV Cont (19 @ 17:45) >    CT of the head demonstrates the ventricles and sulci to be remarkable for   age appropriate involutional changes. Subtle evidence of microvascular   ischemic change. No evidence of acute stroke. No intracranial hemorrhage.   Calcific lesion in the midline measuring 6 mm AP dimension x 7.5 mm   transverse dimension consistent with falx ossification. No abnormal   enhancement on the postcontrast evaluation of the brain.    IMPRESSION:  No intracranial hemorrhage mass or shift. No major vessel   stenosis or occlusion. Hypoplastic right V4 segment. The basilar is   patent. Fetal origin of the left PCA from the left internal carotid   artery, normal anatomic variation.    < end of copied text >      [All pertinent recent Imaging reports reviewed]         *****A S S E S S M E N T   A N D   P L A N :        Excerpt from H&P,Mr. Herron is a 78 year old man with a history of HTN, HLD, kidney stones, GERD, BPH, and possible dementia who presents with subacute dizziness vs vertigo.     He reports he was in his usual state of health until about 6-7 weeks ago when developed intermittent short lived dizziness.  He reports his symptoms would come on occasionally with walking 1-2 blocks and possibly had a room spinning sensation.  Would occur maybe a few times a day and would last a few minutes before resolving spontaneously.  No associated nausea, vomitting, muscle aches, chest pain, palpitations, fevers, chills, blurry vision, ear ringing, ear fullness.  He does note a few episodes of diarrhea and a headache in the last few days.  He has not had any falls or trauma associated.  Due to these symptoms he presented to [facility] ED.       Problem/Recommendations 1: vertigo   central vs. peripheral   cta without R v4 stenosis likely new as pt did not demonstrate this on prior mri .   would continue asa/statin ? unclear why he is not on statin   check orthostatics   mri brain to r/o any ischemia and mra of cow/ to eval R vertebral   cardiac w/u underway     pt consult for gait eval  Problem/Recommendations 2: electrolyte abn of unclear etiology   defer to medicine        ___________________________  Will follow with you.  Thank you,  Constance King MD  Diplomate of the American Board of Neurology and Psychiatry.  Diplomate of the American Board of Vascular Neurology.   [facility] ([facility]), [facility]   Ph: [phone]    Differential diagnosis and plan of care discussed with patient after the evaluation.   Advanced care planning options discussed.   Pain assessed and judicious use of narcotics when appropriate was discussed.  Importance of Fall prevention discussed.  Counseling on Smoking and Alcohol cessation was offered when appropriate.  Counseling on Diet, exercise, and medication compliance was done.     62 minutes spent on the total encounter;  more than 50 % of the visit was spent on counseling  and or coordinating care by the attending physician.    Thank you for allowing me to participate in the care of this david patient. Please do not hesitate to call me if you have any questions.     This and subsequent notes were partially created using voice recognition software and will  inherently be subject to errors including those of syntax and sound alike substitutions which may escape proofreading. In such instances original meaning may be extrapolated by contextual derivation.
Patient is a 78y old  Male who presents with a chief complaint of CC: dizziness       HPI:   78 year old man with a history of HTN, HLD, kidney stones, GERD, BPH, and possible dementia who presents with subacute dizziness vs vertigo.  He reports he was in his usual state of health until about 6-7 weeks ago when developed intermittent short lived dizziness.  He reports his symptoms would come on occasionally with walking 1-2 blocks and possibly had a room spinning sensation.  Would occur maybe a few times a day and would last a few minutes before resolving spontaneously.  No associated nausea, vomitting, muscle aches, chest pain, palpitations, fevers, chills, blurry vision, ear ringing, ear fullness.  He does note a few episodes of diarrhea and a headache in the last few days.  He has not had any falls or trauma associated.  Due to these symptoms he presented to [facility] ED.    In the ED, he was afebrile with unremarkable vital signs.  Labwork remarkable for AG 15, Ca 8.0, AST 41, downtrending trops from 39 to 30, elevated lactate 2.6, elevated , and negative CT angio head/neck and CT a/p, unremarkable CXR He was given NS 1 L, meclizine 50, reglan 10 IV and admitted for further evaluation.      Per patient and family ,feeling better.       PAST MEDICAL & SURGICAL HISTORY:  Hyperlipidemia  Essential Hypertension  PU (Peptic Ulcer)  Secondary Seizure Disorder  CVA (Cerebral Infarction)  Nephrolithiasis: s/p URS stone extraction   Status Post Inguinal Hernia Repair      Social History: Denies smoking ,alcohol or drugs .     FAMILY HISTORY:  Acute myocardial infarction      Allergies    No Known Allergies    Intolerances        REVIEW OF SYSTEMS:    CONSTITUTIONAL: No fever, weight loss, or fatigue  EYES: No eye pain, visual disturbances, or discharge  RESPIRATORY: No cough, wheezing, chills or hemoptysis; No shortness of breath  CARDIOVASCULAR: No chest pain, palpitations, but dizziness,   GASTROINTESTINAL: No abdominal or epigastric pain. No nausea, vomiting, or hematemesis; No diarrhea or constipation. No melena or hematochezia.  GENITOURINARY: No dysuria, frequency, hematuria, or incontinence  NEUROLOGICAL: No headaches, memory loss, loss of strength, numbness, or tremors  SKIN: No itching, burning, rashes, or lesions   ENDOCRINE: No heat or cold intolerance; No hair loss  MUSCULOSKELETAL: No joint pain or swelling; No muscle, back, or extremity pain  PSYCHIATRIC: No depression, anxiety, mood swings, or difficulty sleeping      MEDICATIONS  (STANDING):  aspirin  chewable 81 milliGRAM(s) Oral daily  calcium carbonate   1250 mG (OsCal) 2 Tablet(s) Oral two times a day  ergocalciferol 85717 Unit(s) Oral <User Schedule>  finasteride 5 milliGRAM(s) Oral daily  lisinopril 20 milliGRAM(s) Oral daily  magnesium oxide 400 milliGRAM(s) Oral three times a day with meals  NIFEdipine XL 90 milliGRAM(s) Oral daily  pantoprazole    Tablet 40 milliGRAM(s) Oral before breakfast  potassium chloride    Tablet ER 40 milliEquivalent(s) Oral every 4 hours    MEDICATIONS  (PRN):  meclizine 25 milliGRAM(s) Oral every 8 hours PRN Dizziness      Vital Signs Last 24 Hrs  T(C): 36.8 (18 Sep 2019 14:30), Max: 37.3 (18 Sep 2019 00:48)  T(F): 98.2 (18 Sep 2019 14:30), Max: 99.2 (18 Sep 2019 00:48)  HR: 73 (18 Sep 2019 14:30) (73 - 88)  BP: 127/88 (18 Sep 2019 14:30) (123/70 - 152/88)  BP(mean): --  RR: 17 (18 Sep 2019 14:30) (16 - 18)  SpO2: 96% (18 Sep 2019 14:30) (96% - 99%)    PHYSICAL EXAM:    GENERAL: NAD, well-groomed, well-developed  HEAD:  Atraumatic, Normocephalic  EYES: EOMI, PERRLA, conjunctiva and sclera clear  ENMT: No tonsillar erythema, exudates, or enlargement; Moist mucous membranes, Good dentition, No lesions  NECK: Supple, No JVD, Normal thyroid  NERVOUS SYSTEM:  Alert & Oriented X3, No focal sign   CHEST/LUNG: Clear to percussion bilaterally; No rales, rhonchi, wheezing, or rubs  HEART: Regular rate and rhythm; No murmurs, rubs, or gallops  ABDOMEN: Soft, Nontender, Nondistended; Bowel sounds present  EXTREMITIES:  2+ Peripheral Pulses, No clubbing, cyanosis, or edema    LABS:                        14.1   6.21  )-----------( 160      ( 17 Sep 2019 23:16 )             43.3         141  |  103  |  16  ----------------------------<  89  3.2<L>   |  25  |  0.94    Ca    7.0<L>      18 Sep 2019 06:00  Phos  3.5       Mg     0.9         TPro  6.8  /  Alb  3.7  /  TBili  0.9  /  DBili  0.2  /  AST  42<H>  /  ALT  36  /  AlkPhos  71      PT/INR - ( 17 Sep 2019 23:16 )   PT: 13.0 SEC;   INR: 1.14          PTT - ( 17 Sep 2019 23:16 )  PTT:22.0 SEC  Urinalysis Basic - ( 17 Sep 2019 16:35 )    Color: YELLOW / Appearance: TURBID / S.024 / pH: 6.0  Gluc: NEGATIVE / Ketone: NEGATIVE  / Bili: NEGATIVE / Urobili: NORMAL   Blood: NEGATIVE / Protein: 70 / Nitrite: NEGATIVE   Leuk Esterase: NEGATIVE / RBC: 0-2 / WBC 6-10   Sq Epi: FEW / Non Sq Epi: x / Bacteria: NEGATIVE          RADIOLOGY & ADDITIONAL STUDIES:
Sunil Cueto MD  Interventional Cardiology / Advance Heart Failure and Cardiac Transplant Specialist  [place] Office : [phone] [place] [address] [place] [address]  Tel:   [place] Office : [address] [facility] [place] [address]  Tel: [phone]  Cell : [phone]    HISTORY OF PRESENT ILLNESS:  Mr. Herron is a 78 year old man with a history of HTN, HLD, kidney stones, GERD, BPH, and possible dementia who presents with subacute dizziness vs vertigo.    Of note, he is not a good historian and his history varies significantly from ED documentation of his initial story.    He reports he was in his usual state of health until about 6-7 weeks ago when developed intermittent short lived dizziness.  He reports his symptoms would come on occasionally with walking 1-2 blocks and possibly had a room spinning sensation.  Would occur maybe a few times a day and would last a few minutes before resolving spontaneously.  No associated nausea, vomitting, muscle aches, chest pain, palpitations, fevers, chills, blurry vision, ear ringing, ear fullness.  He does note a few episodes of diarrhea and a headache in the last few days.  He has not had any falls or trauma associated.  Due to these symptoms he presented [facility] ED.    In the ED, he was afebrile with unremarkable vital signs.  Labwork remarkable for AG 15, Ca 8.0, AST 41, downtrending trops from 39 to 30, elevated lactate 2.6, elevated , and negative CT angio head/neck and CT a/p, unremarkable CXR He was given NS 1 L, meclizine 50, reglan 10 IV and admitted for further evaluation.    On evaluation, he gave the above history and that he was not currently feeling any symptoms.  He denies any recent changes in medications.    PAST MEDICAL & SURGICAL HISTORY:  Hyperlipidemia  Essential Hypertension  PU (Peptic Ulcer)  Secondary Seizure Disorder  CVA (Cerebral Infarction)  Nephrolithiasis: s/p URS stone extraction 2012  Status Post Inguinal Hernia Repair    	    MEDICATIONS:  aspirin  chewable 81 milliGRAM(s) Oral daily  lisinopril 20 milliGRAM(s) Oral daily  NIFEdipine XL 90 milliGRAM(s) Oral daily        meclizine 25 milliGRAM(s) Oral every 8 hours PRN    pantoprazole    Tablet 40 milliGRAM(s) Oral before breakfast    finasteride 5 milliGRAM(s) Oral daily    magnesium oxide 400 milliGRAM(s) Oral three times a day with meals  potassium chloride    Tablet ER 40 milliEquivalent(s) Oral every 4 hours      FAMILY HISTORY:  Acute myocardial infarction        Allergies    No Known Allergies    Intolerances    	      PHYSICAL EXAM:  T(C): 37.1 (09-18-19 @ 10:00), Max: 37.3 (09-18-19 @ 00:48)  HR: 88 (09-18-19 @ 10:00) (66 - 88)  BP: 123/70 (09-18-19 @ 10:00) (117/68 - 152/88)  RR: 17 (09-18-19 @ 10:00) (16 - 20)  SpO2: 97% (09-18-19 @ 10:00) (97% - 99%)  Wt(kg): --  I&O's Summary        HEENT:   Normal oral mucosa, PERRL, EOMI	  Cardiovascular: Normal S1 S2, No JVD, No murmurs, No edema  Respiratory: Lungs clear to auscultation	  Gastrointestinal:  Soft, Non-tender, + BS	  Extremities: Normal range of motion, No clubbing, cyanosis or edema    LABS:	 	    CARDIAC MARKERS:                                  14.1   6.21  )-----------( 160      ( 17 Sep 2019 23:16 )             43.3     09-18    141  |  103  |  16  ----------------------------<  89  3.2<L>   |  25  |  0.94    Ca    7.0<L>      18 Sep 2019 06:00  Phos  3.5     09-18  Mg     0.9     09-18    TPro  6.8  /  Alb  3.7  /  TBili  0.9  /  DBili  0.2  /  AST  42<H>  /  ALT  36  /  AlkPhos  71  09-17    proBNP:   Lipid Profile:   HgA1c:   TSH:     ASSESSMENT/PLAN:

## 2019-09-19 NOTE — CONSULT NOTE ADULT - PROBLEM SELECTOR RECOMMENDATION 9
Calcium 7.3 w/ phos 3.2, 25 Vit D 14.3, magnesium 0.7 and PTH  33.34. Likely 2/2 hypomagnesemia and vitamin D deficiency. Hypomagnesemia can cause PTH deficiency and resistance. Now calcium improving to 7.6 with mg of 1.8 and PTH of 118.   -c/w calcium carbonate 2500mg BID   -c/ ergocalciferol 50,000 qweekly  -daily CMP, phos, mg  -replete mg with goal >2.0

## 2019-09-19 NOTE — CONSULT NOTE ADULT - ATTENDING COMMENTS
Hypocalcemia likely due to combination of hypomagnesemia and vitamin D deficiency.  Agree with plan as outlined above.

## 2019-09-19 NOTE — PROGRESS NOTE ADULT - ASSESSMENT
78 year old man with a history of HTN, HLD, kidney stones, GERD, BPH, and possible dementia who presents with subacute dizziness vs vertigo since sat.    Hypokalemia  likely sec to hypomagnesemia sec to Protonix  hx of PUD and GERD will need to continue protonix  supplemented mg and K  on mg-ox 400 tid  monitor bmp    Hypomagnesemia  Serum mag low in the setting of PPI   on mg ox 400 tid  better  monitor    Hypocalcemia  vit d low at 14.3  PTH elevated   supplement vit d 70688 once weekly x8 doses    HTN  controlled  orthostatic + on NS @75cc/hr  monitor

## 2019-09-19 NOTE — CONSULT NOTE ADULT - PROBLEM SELECTOR RECOMMENDATION 2
CAD education completed, stent card given, gave pt contact info for [facility] and [facility] [place].
c/w lisinopril and nifedipine. at goal <130/80

## 2019-09-20 ENCOUNTER — TRANSCRIPTION ENCOUNTER (OUTPATIENT)
Age: 79
End: 2019-09-20

## 2019-09-20 LAB
ANION GAP SERPL CALC-SCNC: 12 MMO/L — SIGNIFICANT CHANGE UP (ref 7–14)
BUN SERPL-MCNC: 15 MG/DL — SIGNIFICANT CHANGE UP (ref 7–23)
CALCIUM SERPL-MCNC: 8.4 MG/DL — SIGNIFICANT CHANGE UP (ref 8.4–10.5)
CHLORIDE SERPL-SCNC: 106 MMOL/L — SIGNIFICANT CHANGE UP (ref 98–107)
CHOLEST SERPL-MCNC: 129 MG/DL — SIGNIFICANT CHANGE UP (ref 120–199)
CK SERPL-CCNC: 103 U/L — SIGNIFICANT CHANGE UP (ref 30–200)
CO2 SERPL-SCNC: 23 MMOL/L — SIGNIFICANT CHANGE UP (ref 22–31)
CREAT SERPL-MCNC: 0.85 MG/DL — SIGNIFICANT CHANGE UP (ref 0.5–1.3)
GLUCOSE SERPL-MCNC: 92 MG/DL — SIGNIFICANT CHANGE UP (ref 70–99)
HCT VFR BLD CALC: 46.6 % — SIGNIFICANT CHANGE UP (ref 39–50)
HDLC SERPL-MCNC: 32 MG/DL — LOW (ref 35–55)
HGB BLD-MCNC: 14.5 G/DL — SIGNIFICANT CHANGE UP (ref 13–17)
LIPID PNL WITH DIRECT LDL SERPL: 90 MG/DL — SIGNIFICANT CHANGE UP
MAGNESIUM SERPL-MCNC: 1.7 MG/DL — SIGNIFICANT CHANGE UP (ref 1.6–2.6)
MCHC RBC-ENTMCNC: 27.8 PG — SIGNIFICANT CHANGE UP (ref 27–34)
MCHC RBC-ENTMCNC: 31.1 % — LOW (ref 32–36)
MCV RBC AUTO: 89.3 FL — SIGNIFICANT CHANGE UP (ref 80–100)
NRBC # FLD: 0 K/UL — SIGNIFICANT CHANGE UP (ref 0–0)
PHOSPHATE SERPL-MCNC: 2.7 MG/DL — SIGNIFICANT CHANGE UP (ref 2.5–4.5)
PLATELET # BLD AUTO: 170 K/UL — SIGNIFICANT CHANGE UP (ref 150–400)
PMV BLD: 10.7 FL — SIGNIFICANT CHANGE UP (ref 7–13)
POTASSIUM SERPL-MCNC: 3.9 MMOL/L — SIGNIFICANT CHANGE UP (ref 3.5–5.3)
POTASSIUM SERPL-SCNC: 3.9 MMOL/L — SIGNIFICANT CHANGE UP (ref 3.5–5.3)
RBC # BLD: 5.22 M/UL — SIGNIFICANT CHANGE UP (ref 4.2–5.8)
RBC # FLD: 12.9 % — SIGNIFICANT CHANGE UP (ref 10.3–14.5)
SODIUM SERPL-SCNC: 141 MMOL/L — SIGNIFICANT CHANGE UP (ref 135–145)
TRIGL SERPL-MCNC: 124 MG/DL — SIGNIFICANT CHANGE UP (ref 10–149)
WBC # BLD: 5.48 K/UL — SIGNIFICANT CHANGE UP (ref 3.8–10.5)
WBC # FLD AUTO: 5.48 K/UL — SIGNIFICANT CHANGE UP (ref 3.8–10.5)

## 2019-09-20 PROCEDURE — 99232 SBSQ HOSP IP/OBS MODERATE 35: CPT | Mod: GC

## 2019-09-20 RX ORDER — DONEPEZIL HYDROCHLORIDE 10 MG/1
1 TABLET, FILM COATED ORAL
Qty: 0 | Refills: 0 | DISCHARGE

## 2019-09-20 RX ORDER — MAGNESIUM SULFATE 500 MG/ML
1 VIAL (ML) INJECTION ONCE
Refills: 0 | Status: COMPLETED | OUTPATIENT
Start: 2019-09-20 | End: 2019-09-20

## 2019-09-20 RX ORDER — CALCIUM CARBONATE 500(1250)
1 TABLET ORAL
Refills: 0 | Status: DISCONTINUED | OUTPATIENT
Start: 2019-09-20 | End: 2019-09-21

## 2019-09-20 RX ORDER — SENNA PLUS 8.6 MG/1
2 TABLET ORAL
Qty: 0 | Refills: 0 | DISCHARGE
Start: 2019-09-20

## 2019-09-20 RX ADMIN — Medication 90 MILLIGRAM(S): at 05:27

## 2019-09-20 RX ADMIN — Medication 2 TABLET(S): at 17:37

## 2019-09-20 RX ADMIN — Medication 2 TABLET(S): at 05:28

## 2019-09-20 RX ADMIN — ATORVASTATIN CALCIUM 40 MILLIGRAM(S): 80 TABLET, FILM COATED ORAL at 21:32

## 2019-09-20 RX ADMIN — FINASTERIDE 5 MILLIGRAM(S): 5 TABLET, FILM COATED ORAL at 12:56

## 2019-09-20 RX ADMIN — SENNA PLUS 2 TABLET(S): 8.6 TABLET ORAL at 21:32

## 2019-09-20 RX ADMIN — SODIUM CHLORIDE 100 MILLILITER(S): 9 INJECTION INTRAMUSCULAR; INTRAVENOUS; SUBCUTANEOUS at 13:08

## 2019-09-20 RX ADMIN — PANTOPRAZOLE SODIUM 40 MILLIGRAM(S): 20 TABLET, DELAYED RELEASE ORAL at 05:28

## 2019-09-20 RX ADMIN — Medication 81 MILLIGRAM(S): at 12:56

## 2019-09-20 RX ADMIN — LISINOPRIL 20 MILLIGRAM(S): 2.5 TABLET ORAL at 05:27

## 2019-09-20 RX ADMIN — Medication 100 GRAM(S): at 17:38

## 2019-09-20 NOTE — PROGRESS NOTE ADULT - ATTENDING COMMENTS
Agree with plan as outlined.  Anticipate dc on calcium supplementation TBD, ergocalciferol 50,000 units weekly and magnesium supplements.

## 2019-09-20 NOTE — PROGRESS NOTE ADULT - ASSESSMENT
ekg - NSR PVC     a/p     1) Dizziness - MRI brain ok, Echo shows grossly normal LV function , orthostatics positive will give IV fluids recheck orthostatics evening    2) Hypocalcemia -  endocrine consult appreciated    3) HTN - cont nifedipine and lisinopril

## 2019-09-20 NOTE — PROGRESS NOTE ADULT - ASSESSMENT
78 year old man with a history of HTN, HLD, kidney stones, GERD, BPH, and possible dementia who presents with subacute dizziness vs vertigo since sat.    Hypokalemia  likely sec to hypomagnesemia sec to Protonix  hx of PUD and GERD will need to continue protonix  supplemented mg and K  likely would need mg-ox 400 bid standing   monitor bmp    Hypomagnesemia  Serum mag low in the setting of PPI   supplemented  better  monitor    Hypocalcemia  vit d low at 14.3  PTH elevated   supplement vit d 32960 once weekly x8 doses    HTN  controlled  orthostatic + on NS @75cc/hr  monitor

## 2019-09-20 NOTE — DISCHARGE NOTE NURSING/CASE MANAGEMENT/SOCIAL WORK - PATIENT PORTAL LINK FT
You can access the FollowMyHealth Patient Portal offered by [facility] by registering at the following website: [email or web address]. By joining [facility]’s FollowMyHealth portal, you will also be able to view your health information using other applications (apps) compatible with our system.

## 2019-09-20 NOTE — PROGRESS NOTE ADULT - SUBJECTIVE AND OBJECTIVE BOX
Chief Complaint: hypocalcemia     History: Feels well with no complaints but still having orthostatic sxs.     MEDICATIONS  (STANDING):  aspirin  chewable 81 milliGRAM(s) Oral daily  atorvastatin 40 milliGRAM(s) Oral at bedtime  calcium carbonate   1250 mG (OsCal) 2 Tablet(s) Oral two times a day  ergocalciferol 07942 Unit(s) Oral <User Schedule>  finasteride 5 milliGRAM(s) Oral daily  influenza   Vaccine 0.5 milliLiter(s) IntraMuscular once  lisinopril 20 milliGRAM(s) Oral daily  NIFEdipine XL 90 milliGRAM(s) Oral daily  pantoprazole    Tablet 40 milliGRAM(s) Oral before breakfast  senna 2 Tablet(s) Oral at bedtime  sodium chloride 0.9%. 1000 milliLiter(s) (100 mL/Hr) IV Continuous <Continuous>    MEDICATIONS  (PRN):  meclizine 25 milliGRAM(s) Oral every 8 hours PRN Dizziness      Allergies    No Known Allergies    Intolerances        PHYSICAL EXAM:  VITALS: T(C): 36.7 (09-20-19 @ 10:07)  T(F): 98.1 (09-20-19 @ 10:07), Max: 98.3 (09-19-19 @ 18:07)  HR: 62 (09-20-19 @ 05:26) (62 - 78)  BP: 148/88 (09-20-19 @ 05:26) (121/74 - 148/88)  RR:  (17 - 18)  SpO2:  (98% - 99%)  Wt(kg): --  GENERAL: NAD, well-groomed, well-developed  RESPIRATORY: Clear to auscultation bilaterally; No rales, rhonchi, wheezing, or rubs  CARDIOVASCULAR: Regular rate and rhythm; No murmurs  NEURO: AOx3, moves all extremities spontaenuously   PSYCH:  reactive affect, euthymic mood          09-20    141  |  106  |  15  ----------------------------<  92  3.9   |  23  |  0.85    EGFR if : 97  EGFR if non : 83    Ca    8.4      09-20  Mg     1.7     09-20  Phos  2.7     09-20    TPro  7.1  /  Alb  3.7  /  TBili  0.7  /  DBili  x   /  AST  34  /  ALT  36  /  AlkPhos  80  09-19          Thyroid Function Tests:  09-17 @ 13:20 TSH 0.81 FreeT4 -- T3 -- Anti TPO -- Anti Thyroglobulin Ab -- TSI --

## 2019-09-20 NOTE — PROGRESS NOTE ADULT - SUBJECTIVE AND OBJECTIVE BOX
INTERVAL HPI/OVERNIGHT EVENTS: I feel fine.   Vital Signs Last 24 Hrs  T(C): 36.8 (20 Sep 2019 05:26), Max: 36.8 (19 Sep 2019 12:24)  T(F): 98.2 (20 Sep 2019 05:26), Max: 98.3 (19 Sep 2019 12:24)  HR: 62 (20 Sep 2019 05:26) (62 - 78)  BP: 148/88 (20 Sep 2019 05:26) (121/74 - 148/88)  BP(mean): --  RR: 17 (20 Sep 2019 05:26) (17 - 18)  SpO2: 98% (20 Sep 2019 05:26) (97% - 100%)  I&O's Summary    MEDICATIONS  (STANDING):  aspirin  chewable 81 milliGRAM(s) Oral daily  atorvastatin 40 milliGRAM(s) Oral at bedtime  calcium carbonate   1250 mG (OsCal) 2 Tablet(s) Oral two times a day  ergocalciferol 00566 Unit(s) Oral <User Schedule>  finasteride 5 milliGRAM(s) Oral daily  influenza   Vaccine 0.5 milliLiter(s) IntraMuscular once  lisinopril 20 milliGRAM(s) Oral daily  NIFEdipine XL 90 milliGRAM(s) Oral daily  pantoprazole    Tablet 40 milliGRAM(s) Oral before breakfast  senna 2 Tablet(s) Oral at bedtime  sodium chloride 0.9%. 1000 milliLiter(s) (75 mL/Hr) IV Continuous <Continuous>    MEDICATIONS  (PRN):  meclizine 25 milliGRAM(s) Oral every 8 hours PRN Dizziness    LABS:                        14.5   5.48  )-----------( 170      ( 20 Sep 2019 06:10 )             46.6     09-20    141  |  106  |  15  ----------------------------<  92  3.9   |  23  |  0.85    Ca    8.4      20 Sep 2019 06:10  Phos  2.7     09-20  Mg     1.7     09-20    TPro  7.1  /  Alb  3.7  /  TBili  0.7  /  DBili  x   /  AST  34  /  ALT  36  /  AlkPhos  80  09-19        CAPILLARY BLOOD GLUCOSE              REVIEW OF SYSTEMS:  CONSTITUTIONAL: No fever, weight loss, or fatigue  EYES: No eye pain, visual disturbances, or discharge  ENMT:  No difficulty hearing, tinnitus, vertigo; No sinus or throat pain  NECK: No pain or stiffness  RESPIRATORY: No cough, wheezing, chills or hemoptysis; No shortness of breath  CARDIOVASCULAR: No chest pain, palpitations, dizziness, or leg swelling  GASTROINTESTINAL: No abdominal or epigastric pain. No nausea, vomiting, or hematemesis; No diarrhea or constipation. No melena or hematochezia.  GENITOURINARY: No dysuria, frequency, hematuria, or incontinence  NEUROLOGICAL: No headaches, memory loss, loss of strength, numbness, or tremors    Consultant(s) Notes Reviewed:  [x ] YES  [ ] NO    PHYSICAL EXAM:  GENERAL: NAD, well-groomed, well-developed ,not in any distress ,  HEAD:  Atraumatic, Normocephalic  EYES: EOMI, PERRLA, conjunctiva and sclera clear  ENMT: No tonsillar erythema, exudates, or enlargement; Moist mucous membranes, Good dentition, No lesions  NECK: Supple, No JVD, Normal thyroid  NERVOUS SYSTEM:  Alert & Oriented X3, No focal deficit   CHEST/LUNG: Good air entry bilateral with no  rales, rhonchi, wheezing, or rubs  HEART: Regular rate and rhythm; No murmurs, rubs, or gallops  ABDOMEN: Soft, Nontender, Nondistended; Bowel sounds present  EXTREMITIES:  2+ Peripheral Pulses, No clubbing, cyanosis, or edema    Care Discussed with Consultants/Other Providers [ x] YES  [ ] NO

## 2019-09-20 NOTE — PROGRESS NOTE ADULT - SUBJECTIVE AND OBJECTIVE BOX
[facility]   MD ASHLEY RODARTE, DO JAIMEE CONROY, CORAL AYERS    TEL:  OFFICE: [phone]  DR WILD CELL: [phone]  DR. SEN CELL: [phone]  DR. CONROY CELL: [phone]  ANNABELLE MCLAUGHLIN CELL: [phone]        Patient is a 78y old  Male who presents with a chief complaint of CC: dizziness (20 Sep 2019 09:29)      Patient seen and examined at bedside. No chest pain/sob. still slightly dizzy when getting up    VITALS:  T(F): 98.2 (09-20-19 @ 05:26), Max: 98.3 (09-19-19 @ 18:07)  HR: 62 (09-20-19 @ 05:26)  BP: 148/88 (09-20-19 @ 05:26)  RR: 17 (09-20-19 @ 05:26)  SpO2: 98% (09-20-19 @ 05:26)  Wt(kg): --        PHYSICAL EXAM:  Constitutional: NAD  Neck: No JVD  Respiratory: CTAB, no wheezes, rales or rhonchi  Cardiovascular: S1, S2, RRR  Gastrointestinal: BS+, soft, NT/ND  Extremities: No peripheral edema    Hospital Medications:   MEDICATIONS  (STANDING):  aspirin  chewable 81 milliGRAM(s) Oral daily  atorvastatin 40 milliGRAM(s) Oral at bedtime  calcium carbonate   1250 mG (OsCal) 2 Tablet(s) Oral two times a day  ergocalciferol 92708 Unit(s) Oral <User Schedule>  finasteride 5 milliGRAM(s) Oral daily  influenza   Vaccine 0.5 milliLiter(s) IntraMuscular once  lisinopril 20 milliGRAM(s) Oral daily  NIFEdipine XL 90 milliGRAM(s) Oral daily  pantoprazole    Tablet 40 milliGRAM(s) Oral before breakfast  senna 2 Tablet(s) Oral at bedtime  sodium chloride 0.9%. 1000 milliLiter(s) (75 mL/Hr) IV Continuous <Continuous>      LABS:  09-20    141  |  106  |  15  ----------------------------<  92  3.9   |  23  |  0.85    Ca    8.4      20 Sep 2019 06:10  Phos  2.7     09-20  Mg     1.7     09-20    TPro  7.1  /  Alb  3.7  /  TBili  0.7  /  DBili      /  AST  34  /  ALT  36  /  AlkPhos  80  09-19    Creatinine Trend: 0.85 <--, 0.82 <--, 0.85 <--, 0.94 <--, 1.02 <--, 1.27 <--    Phosphorus Level, Serum: 2.7 mg/dL (09-20 @ 06:10)                              14.5   5.48  )-----------( 170      ( 20 Sep 2019 06:10 )             46.6     Urine Studies:  Urinalysis - [09-17-19 @ 16:35]      Color YELLOW / Appearance TURBID / SG 1.024 / pH 6.0      Gluc NEGATIVE / Ketone NEGATIVE  / Bili NEGATIVE / Urobili NORMAL       Blood NEGATIVE / Protein 70 / Leuk Est NEGATIVE / Nitrite NEGATIVE      RBC 0-2 / WBC 6-10 / Hyaline 1+ / Gran  / Sq Epi FEW / Non Sq Epi  / Bacteria NEGATIVE      .1 (Ca --)      [09-19-19 @ 06:05]   --  PTH 33.34 (Ca --)      [09-17-19 @ 23:16]   --  Vitamin D (25OH) 14.3      [09-17-19 @ 23:16]  TSH 0.81      [09-17-19 @ 13:20]  Lipid: chol 129, , HDL 32, LDL 90      [09-20-19 @ 06:10]        RADIOLOGY & ADDITIONAL STUDIES:

## 2019-09-20 NOTE — PROGRESS NOTE ADULT - ASSESSMENT
78 year old man with a history of HTN, HLD, kidney stones, GERD, BPH, and possible dementia who presents with subacute dizziness vs vertigo.  He reports he was in his usual state of health until about 6-7 weeks ago when developed intermittent short lived dizziness.  He reports his symptoms would come on occasionally with walking 1-2 blocks and possibly had a room spinning sensation.  Would occur maybe a few times a day and would last a few minutes before resolving spontaneously.  No associated nausea, vomitting, muscle aches, chest pain, palpitations, fevers, chills, blurry vision, ear ringing, ear fullness.  He does note a few episodes of diarrhea and a headache in the last few days.  He has not had any falls or trauma associated.  Due to these symptoms he presented to [facility] ED.  In the ED, he was afebrile with unremarkable vital signs.  Labwork remarkable for AG 15, Ca 8.0, AST 41, downtrending trops from 39 to 30, elevated lactate 2.6, elevated , and negative CT angio head/neck and CT a/p, unremarkable CXR He was given NS 1 L, meclizine 50, reglan 10 IV and admitted for further evaluation.         Problem/Plan - 1:  ·  Problem: Vertigo.  Plan: -Resolved. CT ,CTA noted .   -TTE noted.   -MRI and MRA noted.      Problem/Plan - 2:  ·  Problem: Essential Hypertension.  Plan: BP readings fine.  Continue home nifedipine and lisinopril for BP management.      Problem/Plan - 3:  ·  Problem: Mild cognitive impairment.  Plan:  Doing well. .      Problem/Plan - 4:  ·  Problem: Benign prostatic hyperplasia without lower urinary tract symptoms.  Plan: On dutasteride and Silodosin as outpatient.  Will hold silodosin as frequently causes some orthostatic hypotension  Continue dutasteride.      Problem/Plan - 5:  ·  Problem: Hyperlipidemia, unspecified hyperlipidemia type.  Plan: Statin      Problem/Plan - 6:  Problem: Hypocalcemia . Plan: Corrected. Endo consult noted.      Problem/Plan - 7:  ·  Problem: Need for prophylactic measure.  Plan: Improve score 1 for age.  SCD for DVT prophylaxis

## 2019-09-20 NOTE — PROGRESS NOTE ADULT - PROBLEM SELECTOR PLAN 1
Calcium 7.3 w/ phos 3.2, 25 Vit D 14.3, magnesium 0.7 and PTH  33.34. Likely 2/2 hypomagnesemia and vitamin D deficiency. Hypomagnesemia can cause PTH deficiency and resistance. Now calcium improving to 8.6 with mg of 1.8 and PTH of 118.   -c/w calcium carbonate 2500mg qday    -c/ ergocalciferol 50,000 qweekly  -daily CMP, phos, mg  -replete mg with goal >2.0. Calcium 7.3 w/ phos 3.2, 25 Vit D 14.3, magnesium 0.7 and PTH  33.34. Likely 2/2 hypomagnesemia and vitamin D deficiency. Hypomagnesemia can cause PTH deficiency and resistance. Now calcium improving to 8.6 with mg of 1.8 and PTH of 118.   -change calcium carbonate 1250mg BID    -c/ ergocalciferol 50,000 qweekly  -daily CMP, phos, mg  -replete mg with goal >2.0.    Discharge on current doses of calcium carbonate 1250 mg BID and ergocalciferol 50,000 IU qweekly

## 2019-09-20 NOTE — PROGRESS NOTE ADULT - SUBJECTIVE AND OBJECTIVE BOX
Sunil Cueto MD  Interventional Cardiology / Advance Heart Failure and Cardiac Transplant Specialist  [place] Office : [address] [place] [address] [place]. [address]  Tel:   [place] Office : [address] [facility] [place] [address]  Tel: [phone]  Cell : [phone]    HISTORY OF PRESENTING ILLNESS:  HPI:  This is a 78y Male h/o HTN, GERD , HLD who presents with dizziness now improving  	  MEDICATIONS:  aspirin  chewable 81 milliGRAM(s) Oral daily  lisinopril 20 milliGRAM(s) Oral daily  NIFEdipine XL 90 milliGRAM(s) Oral daily        meclizine 25 milliGRAM(s) Oral every 8 hours PRN    pantoprazole    Tablet 40 milliGRAM(s) Oral before breakfast  senna 2 Tablet(s) Oral at bedtime    atorvastatin 40 milliGRAM(s) Oral at bedtime  finasteride 5 milliGRAM(s) Oral daily    calcium carbonate   1250 mG (OsCal) 1 Tablet(s) Oral two times a day  ergocalciferol 43244 Unit(s) Oral <User Schedule>  influenza   Vaccine 0.5 milliLiter(s) IntraMuscular once  sodium chloride 0.9%. 1000 milliLiter(s) IV Continuous <Continuous>      PAST MEDICAL/SURGICAL HISTORY  PAST MEDICAL & SURGICAL HISTORY:  Hyperlipidemia  Essential Hypertension  PU (Peptic Ulcer)  Secondary Seizure Disorder  CVA (Cerebral Infarction)  Nephrolithiasis: s/p URS stone extraction 2012  Status Post Inguinal Hernia Repair      SOCIAL HISTORY: Substance Use (street drugs): ( x ) never used  (  ) other:    FAMILY HISTORY:  Acute myocardial infarction      REVIEW OF SYSTEMS:  CONSTITUTIONAL: No fever, weight loss, or fatigue  EYES: No eye pain, visual disturbances, or discharge  ENMT:  No difficulty hearing, tinnitus, vertigo; No sinus or throat pain  BREASTS: No pain, masses, or nipple discharge  GASTROINTESTINAL: No abdominal or epigastric pain. No nausea, vomiting, or hematemesis; No diarrhea or constipation. No melena or hematochezia.  GENITOURINARY: No dysuria, frequency, hematuria, or incontinence  NEUROLOGICAL: No headaches, memory loss, loss of strength, numbness, or tremors  ENDOCRINE: No heat or cold intolerance; No hair loss  MUSCULOSKELETAL: No joint pain or swelling; No muscle, back, or extremity pain  PSYCHIATRIC: No depression, anxiety, mood swings, or difficulty sleeping  HEME/LYMPH: No easy bruising, or bleeding gums  All others negative    PHYSICAL EXAM:  T(C): 36.7 (09-20-19 @ 10:07), Max: 36.8 (09-20-19 @ 05:26)  HR: 62 (09-20-19 @ 05:26) (62 - 62)  BP: 148/88 (09-20-19 @ 05:26) (148/88 - 148/88)  RR: 18 (09-20-19 @ 10:07) (17 - 18)  SpO2: 99% (09-20-19 @ 10:07) (98% - 99%)  Wt(kg): --  I&O's Summary        GENERAL: NAD, well-groomed, well-developed  EYES: EOMI, PERRLA, conjunctiva and sclera clear  ENMT: No tonsillar erythema, exudates, or enlargement; Moist mucous membranes, Good dentition, No lesions  Cardiovascular: Normal S1 S2, No JVD, No murmurs, No edema  Respiratory: Lungs clear to auscultation	  Gastrointestinal:  Soft, Non-tender, + BS	  Extremities: Normal range of motion, No clubbing, cyanosis or edema  LYMPH: No lymphadenopathy noted  NERVOUS SYSTEM:  Alert & Oriented X3, Good concentration; Motor Strength 5/5 B/L upper and lower extremities; DTRs 2+ intact and symmetric                                    14.5   5.48  )-----------( 170      ( 20 Sep 2019 06:10 )             46.6     09-20    141  |  106  |  15  ----------------------------<  92  3.9   |  23  |  0.85    Ca    8.4      20 Sep 2019 06:10  Phos  2.7     09-20  Mg     1.7     09-20    TPro  7.1  /  Alb  3.7  /  TBili  0.7  /  DBili  x   /  AST  34  /  ALT  36  /  AlkPhos  80  09-19    proBNP:   Lipid Profile:   HgA1c:   TSH:     Consultant(s) Notes Reviewed:  [x ] YES  [ ] NO    Care Discussed with Consultants/Other Providers [ x] YES  [ ] NO    Imaging Personally Reviewed independently:  [x] YES  [ ] NO    All labs, radiologic studies, vitals, orders and medications list reviewed. Patient is seen and examined at bedside. Case discussed with medical team.

## 2019-09-21 ENCOUNTER — TRANSCRIPTION ENCOUNTER (OUTPATIENT)
Age: 79
End: 2019-09-21

## 2019-09-21 VITALS
RESPIRATION RATE: 17 BRPM | HEART RATE: 53 BPM | OXYGEN SATURATION: 96 % | SYSTOLIC BLOOD PRESSURE: 159 MMHG | TEMPERATURE: 98 F | DIASTOLIC BLOOD PRESSURE: 94 MMHG

## 2019-09-21 LAB
ANION GAP SERPL CALC-SCNC: 10 MMO/L — SIGNIFICANT CHANGE UP (ref 7–14)
BUN SERPL-MCNC: 17 MG/DL — SIGNIFICANT CHANGE UP (ref 7–23)
CALCIUM SERPL-MCNC: 8.9 MG/DL — SIGNIFICANT CHANGE UP (ref 8.4–10.5)
CHLORIDE SERPL-SCNC: 104 MMOL/L — SIGNIFICANT CHANGE UP (ref 98–107)
CO2 SERPL-SCNC: 27 MMOL/L — SIGNIFICANT CHANGE UP (ref 22–31)
CREAT SERPL-MCNC: 0.92 MG/DL — SIGNIFICANT CHANGE UP (ref 0.5–1.3)
GLUCOSE SERPL-MCNC: 90 MG/DL — SIGNIFICANT CHANGE UP (ref 70–99)
HCT VFR BLD CALC: 41.9 % — SIGNIFICANT CHANGE UP (ref 39–50)
HGB BLD-MCNC: 13.7 G/DL — SIGNIFICANT CHANGE UP (ref 13–17)
MAGNESIUM SERPL-MCNC: 1.7 MG/DL — SIGNIFICANT CHANGE UP (ref 1.6–2.6)
MCHC RBC-ENTMCNC: 28.5 PG — SIGNIFICANT CHANGE UP (ref 27–34)
MCHC RBC-ENTMCNC: 32.7 % — SIGNIFICANT CHANGE UP (ref 32–36)
MCV RBC AUTO: 87.3 FL — SIGNIFICANT CHANGE UP (ref 80–100)
NRBC # FLD: 0 K/UL — SIGNIFICANT CHANGE UP (ref 0–0)
PLATELET # BLD AUTO: 185 K/UL — SIGNIFICANT CHANGE UP (ref 150–400)
PMV BLD: 10.4 FL — SIGNIFICANT CHANGE UP (ref 7–13)
POTASSIUM SERPL-MCNC: 4.1 MMOL/L — SIGNIFICANT CHANGE UP (ref 3.5–5.3)
POTASSIUM SERPL-SCNC: 4.1 MMOL/L — SIGNIFICANT CHANGE UP (ref 3.5–5.3)
RBC # BLD: 4.8 M/UL — SIGNIFICANT CHANGE UP (ref 4.2–5.8)
RBC # FLD: 12.9 % — SIGNIFICANT CHANGE UP (ref 10.3–14.5)
SODIUM SERPL-SCNC: 141 MMOL/L — SIGNIFICANT CHANGE UP (ref 135–145)
WBC # BLD: 6.12 K/UL — SIGNIFICANT CHANGE UP (ref 3.8–10.5)
WBC # FLD AUTO: 6.12 K/UL — SIGNIFICANT CHANGE UP (ref 3.8–10.5)

## 2019-09-21 RX ORDER — CALCIUM CARBONATE 500(1250)
1 TABLET ORAL
Qty: 60 | Refills: 0
Start: 2019-09-21 | End: 2019-10-20

## 2019-09-21 RX ORDER — MECLIZINE HCL 12.5 MG
1 TABLET ORAL
Qty: 21 | Refills: 0
Start: 2019-09-21 | End: 2019-09-27

## 2019-09-21 RX ORDER — ERGOCALCIFEROL 1.25 MG/1
1 CAPSULE ORAL
Qty: 4 | Refills: 0
Start: 2019-09-21 | End: 2019-10-20

## 2019-09-21 RX ORDER — FINASTERIDE 5 MG/1
1 TABLET, FILM COATED ORAL
Qty: 0 | Refills: 0 | DISCHARGE
Start: 2019-09-21

## 2019-09-21 RX ORDER — DUTASTERIDE 0.5 MG/1
1 CAPSULE, LIQUID FILLED ORAL
Qty: 0 | Refills: 0 | DISCHARGE

## 2019-09-21 RX ADMIN — Medication 81 MILLIGRAM(S): at 11:40

## 2019-09-21 RX ADMIN — LISINOPRIL 20 MILLIGRAM(S): 2.5 TABLET ORAL at 06:00

## 2019-09-21 RX ADMIN — Medication 1 TABLET(S): at 06:00

## 2019-09-21 RX ADMIN — PANTOPRAZOLE SODIUM 40 MILLIGRAM(S): 20 TABLET, DELAYED RELEASE ORAL at 06:00

## 2019-09-21 RX ADMIN — FINASTERIDE 5 MILLIGRAM(S): 5 TABLET, FILM COATED ORAL at 11:40

## 2019-09-21 NOTE — DISCHARGE NOTE PROVIDER - NSDCCPCAREPLAN_GEN_ALL_CORE_FT
PRINCIPAL DISCHARGE DIAGNOSIS  Diagnosis: Generalized weakness  Assessment and Plan of Treatment: PRINCIPAL DISCHARGE DIAGNOSIS  Diagnosis: Dizziness  Assessment and Plan of Treatment: MRI negative for acute stroke, can use meclizine as needed for dizziness and follow up with cardiology in 1 week.      SECONDARY DISCHARGE DIAGNOSES  Diagnosis: Hypocalcemia  Assessment and Plan of Treatment: Continue with Vit D and Calcium supplementation as prescribed. Follow up with endocrinology/primary care doctor in 1-2 weeks to monitor yor calcium and Vitamin D levels as well as electrolytes. Your doctor may need to make adjustments to the supplementation prescibed so it is very imporatant that you have your levels rechecked.    Diagnosis: Essential Hypertension  Assessment and Plan of Treatment: Low sodium and fat diet, continue anti-hypertensive medications, and follow up with primary care physician.    Diagnosis: Benign prostatic hyperplasia without lower urinary tract symptoms  Assessment and Plan of Treatment: Hold rapiflow as this may cause orthostatic hypotension

## 2019-09-21 NOTE — PROGRESS NOTE ADULT - ASSESSMENT
78 year old man with a history of HTN, HLD, kidney stones, GERD, BPH, and possible dementia who presents with subacute dizziness vs vertigo.  He reports he was in his usual state of health until about 6-7 weeks ago when developed intermittent short lived dizziness.  He reports his symptoms would come on occasionally with walking 1-2 blocks and possibly had a room spinning sensation.  Would occur maybe a few times a day and would last a few minutes before resolving spontaneously.  No associated nausea, vomitting, muscle aches, chest pain, palpitations, fevers, chills, blurry vision, ear ringing, ear fullness.  He does note a few episodes of diarrhea and a headache in the last few days.  He has not had any falls or trauma associated.  Due to these symptoms he presented to [facility] ED.  In the ED, he was afebrile with unremarkable vital signs.  Labwork remarkable for AG 15, Ca 8.0, AST 41, downtrending trops from 39 to 30, elevated lactate 2.6, elevated , and negative CT angio head/neck and CT a/p, unremarkable CXR He was given NS 1 L, meclizine 50, reglan 10 IV and admitted for further evaluation.         Problem/Plan - 1:  ·  Problem: Vertigo.  Plan: -Resolved. CT ,CTA noted .   -TTE noted.   -MRI and MRA noted.      Problem/Plan - 2:  ·  Problem: Essential Hypertension.  Plan: BP readings fine.  Continue home nifedipine and lisinopril for BP management.      Problem/Plan - 3:  ·  Problem: Mild cognitive impairment.  Plan:  Doing well. .      Problem/Plan - 4:  ·  Problem: Benign prostatic hyperplasia without lower urinary tract symptoms.  Plan: On dutasteride and Silodosin as outpatient.  Will hold silodosin as frequently causes some orthostatic hypotension  Continue dutasteride.      Problem/Plan - 5:  ·  Problem: Hyperlipidemia, unspecified hyperlipidemia type.  Plan: Statin      Problem/Plan - 6:  Problem: Hypocalcemia . Plan: Corrected. Endo consult noted.      Problem/Plan - 7:  ·  Problem: Need for prophylactic measure.  Plan: Improve score 1 for age.  SCD for DVT prophylaxis

## 2019-09-21 NOTE — DISCHARGE NOTE PROVIDER - HOSPITAL COURSE
HPI:    Mr. Herron is a 78 year old man with a history of HTN, HLD, kidney stones, GERD, BPH, and possible dementia who presents with subacute dizziness vs vertigo.        Of note, he is not a good historian and his history varies significantly from ED documentation of his initial story.        He reports he was in his usual state of health until about 6-7 weeks ago when developed intermittent short lived dizziness.  He reports his symptoms would come on occasionally with walking 1-2 blocks and possibly had a room spinning sensation.  Would occur maybe a few times a day and would last a few minutes before resolving spontaneously.  No associated nausea, vomitting, muscle aches, chest pain, palpitations, fevers, chills, blurry vision, ear ringing, ear fullness.  He does note a few episodes of diarrhea and a headache in the last few days.  He has not had any falls or trauma associated.  Due to these symptoms he presented to [facility] ED.        In the ED, he was afebrile with unremarkable vital signs.  Labwork remarkable for AG 15, Ca 8.0, AST 41, downtrending trops from 39 to 30, elevated lactate 2.6, elevated , and negative CT angio head/neck and CT a/p, unremarkable CXR He was given NS 1 L, meclizine 50, reglan 10 IV and admitted for further evaluation.        On evaluation, he gave the above history and that he was not currently feeling any symptoms.  He denies any recent changes in medications. (17 Sep 2019 22:00) HPI:    Mr. Herron is a 78 year old man with a history of HTN, HLD, kidney stones, GERD, BPH, and possible dementia who presents with subacute dizziness vs vertigo.        Of note, he is not a good historian and his history varies significantly from ED documentation of his initial story.        He reports he was in his usual state of health until about 6-7 weeks ago when developed intermittent short lived dizziness.  He reports his symptoms would come on occasionally with walking 1-2 blocks and possibly had a room spinning sensation.  Would occur maybe a few times a day and would last a few minutes before resolving spontaneously.  No associated nausea, vomitting, muscle aches, chest pain, palpitations, fevers, chills, blurry vision, ear ringing, ear fullness.  He does note a few episodes of diarrhea and a headache in the last few days.  He has not had any falls or trauma associated.  Due to these symptoms he presented to [facility] ED.        In the ED, he was afebrile with unremarkable vital signs.  Labwork remarkable for AG 15, Ca 8.0, AST 41, downtrending trops from 39 to 30, elevated lactate 2.6, elevated , and negative CT angio head/neck and CT a/p, unremarkable CXR He was given NS 1 L, meclizine 50, reglan 10 IV and admitted for further evaluation.        On evaluation, he gave the above history and that he was not currently feeling any symptoms.  He denies any recent changes in medications.         #Vertigo    CE's (hsTrop: 39-->30; CK: 554-->464-->442-->350)    ESR: 7         CRP: 70.8            Lactate: 2.6               UA neg    9/17 CTA Head/Neck - no intracranial hemorrhage mass or shift. No major vessel stenosis or occlusion. Hypoplastic right V4 segment. The basilar is patent. Fetal orgin of the left PCA from the left internal carotid artery, normal anatomic variation.    9/18 MRI/MRA H/N: Unchanged volume loss, microvascular disease, lacunar infarcts. No evidence for any acute new hemorrhage, territorial restricted diffusion, or midline shift. Basal cisterns remain patent. Tortuous extra and intracranial vessels likely reflecting hypertension,     fetal left PCA with hypoplastic left P1 segment and left A1 segment and     dominant right A1 segment likely anatomic variant.    9/17 CXR - clear lungs. No pleural effusions or pneumothorax. Cardiac and mediastinal silhouettes within normal limits for the projection. Trachea mildline. Generalized mild osteopenia. Mild right AC joint arthrosis.    9/17 CT Abd/Pelvis - no acute findings to exclude patient's symptoms. Bilateral nonobstructing nephrolithiasis and bladder calculus.    9/18 TTE: Normal TTE. EF 62%         #Essential Hypertension    - Continue home nifedipine and lisinopril for BP management.         #Mild cognitive impairment    - On donepezil as outpatient for presumed MCI.  Fully oriented and understanding.    Hold donepezil as may cause some dizziness and nonessential medication.         #Benign prostatic hyperplasia    - On dutasteride and silodosin as outpatient.    Will hold silodosin as frequently causes some orthostatic hypotension    Continue dutasteride.         #Hyperlipidemia    - On atorvastatin as outpatient.  CK elevated on arrival.  No symptoms of muscle aches.    -Will hold atorvastatin for now.  Trend CK and consider switching statins        #Hypocalecemia    -House endo c/s pending HPI:    Mr. Herron is a 78 year old man with a history of HTN, HLD, kidney stones, GERD, BPH, and possible dementia who presents with subacute dizziness vs vertigo.        Of note, he is not a good historian and his history varies significantly from ED documentation of his initial story.        He reports he was in his usual state of health until about 6-7 weeks ago when developed intermittent short lived dizziness.  He reports his symptoms would come on occasionally with walking 1-2 blocks and possibly had a room spinning sensation.  Would occur maybe a few times a day and would last a few minutes before resolving spontaneously.  No associated nausea, vomitting, muscle aches, chest pain, palpitations, fevers, chills, blurry vision, ear ringing, ear fullness.  He does note a few episodes of diarrhea and a headache in the last few days.  He has not had any falls or trauma associated.  Due to these symptoms he presented to [facility] ED.        In the ED, he was afebrile with unremarkable vital signs.  Labwork remarkable for AG 15, Ca 8.0, AST 41, downtrending trops from 39 to 30, elevated lactate 2.6, elevated , and negative CT angio head/neck and CT a/p, unremarkable CXR He was given NS 1 L, meclizine 50, reglan 10 IV and admitted for further evaluation.        On evaluation, he gave the above history and that he was not currently feeling any symptoms.  He denies any recent changes in medications.         + Vertigo    CE's (hsTrop: 39-->30; CK: 554-->464-->442-->350)    ESR: 7         CRP: 70.8            Lactate: 2.6               UA neg    9/17 CTA Head/Neck - no intracranial hemorrhage mass or shift. No major vessel stenosis or occlusion. Hypoplastic right V4 segment. The basilar is patent. Fetal orgin of the left PCA from the left internal carotid artery, normal anatomic variation.    9/18 MRI/MRA H/N: Unchanged volume loss, microvascular disease, lacunar infarcts. No evidence for any acute new hemorrhage, territorial restricted diffusion, or midline shift. Basal cisterns remain patent. Tortuous extra and intracranial vessels likely reflecting hypertension,     fetal left PCA with hypoplastic left P1 segment and left A1 segment and     dominant right A1 segment likely anatomic variant.    9/17 CXR - clear lungs. No pleural effusions or pneumothorax. Cardiac and mediastinal silhouettes within normal limits for the projection. Trachea mildline. Generalized mild osteopenia. Mild right AC joint arthrosis.    9/17 CT Abd/Pelvis - no acute findings to exclude patient's symptoms. Bilateral nonobstructing nephrolithiasis and bladder calculus.    9/18 TTE: Normal TTE. EF 62%         + Essential Hypertension    - Continue home nifedipine and lisinopril for BP management.         + Mild cognitive impairment    - On donepezil as outpatient for presumed MCI.  Fully oriented and understanding.    Hold donepezil as may cause some dizziness and nonessential medication.         + Benign prostatic hyperplasia    - On dutasteride and silodosin as outpatient.    Will hold silodosin as frequently causes some orthostatic hypotension    Continue dutasteride.         + Hyperlipidemia    - On atorvastatin as outpatient.  CK elevated on arrival.  No symptoms of muscle aches.    -Will hold atorvastatin for now.  Trend CK and consider switching statins        + Hypocalecemia    vit d low at 14.3    PTH elevated     supplement vit d 63380 once weekly x8 doses    House endo c/s: change calcium carbonate 1250mg BID    Discharge on current doses of calcium carbonate 1250 mg BID and ergocalciferol 50,000 IU        + Orthostatic BP now negative s/p IVF and compression stockings, will hold sildosin        Discussed case with Rom Blanca and endocrinology, pt cleared for discharge.

## 2019-09-21 NOTE — DISCHARGE NOTE PROVIDER - NSDCFUADDAPPT_GEN_ALL_CORE_FT
Please follow up with Dr. Cueto in 1 week. Please call for appointment. Please follow up with Dr. Cueto in 1 week. Please call for appointment.  Follow up with primary care doctor in 1 week to have electrolytes checked.

## 2019-09-21 NOTE — PROGRESS NOTE ADULT - REASON FOR ADMISSION
CC: dizziness

## 2019-09-21 NOTE — PROGRESS NOTE ADULT - SUBJECTIVE AND OBJECTIVE BOX
INTERVAL HPI/OVERNIGHT EVENTS: I feel fine so want to go .   Vital Signs Last 24 Hrs  T(C): 36.7 (21 Sep 2019 06:01), Max: 36.7 (20 Sep 2019 21:31)  T(F): 98.1 (21 Sep 2019 06:01), Max: 98.1 (21 Sep 2019 06:01)  HR: 53 (21 Sep 2019 06:01) (53 - 96)  BP: 159/94 (21 Sep 2019 06:01) (125/84 - 159/94)  BP(mean): --  RR: 17 (21 Sep 2019 06:01) (17 - 17)  SpO2: 96% (21 Sep 2019 06:01) (96% - 99%)  I&O's Summary    21 Sep 2019 07:01  -  21 Sep 2019 14:58  --------------------------------------------------------  IN: 0 mL / OUT: 1200 mL / NET: -1200 mL      MEDICATIONS  (STANDING):  aspirin  chewable 81 milliGRAM(s) Oral daily  atorvastatin 40 milliGRAM(s) Oral at bedtime  calcium carbonate   1250 mG (OsCal) 1 Tablet(s) Oral two times a day  ergocalciferol 83101 Unit(s) Oral <User Schedule>  finasteride 5 milliGRAM(s) Oral daily  influenza   Vaccine 0.5 milliLiter(s) IntraMuscular once  lisinopril 20 milliGRAM(s) Oral daily  NIFEdipine XL 90 milliGRAM(s) Oral daily  pantoprazole    Tablet 40 milliGRAM(s) Oral before breakfast  senna 2 Tablet(s) Oral at bedtime  sodium chloride 0.9%. 1000 milliLiter(s) (100 mL/Hr) IV Continuous <Continuous>    MEDICATIONS  (PRN):  meclizine 25 milliGRAM(s) Oral every 8 hours PRN Dizziness    LABS:                        13.7   6.12  )-----------( 185      ( 21 Sep 2019 05:55 )             41.9     09-21    141  |  104  |  17  ----------------------------<  90  4.1   |  27  |  0.92    Ca    8.9      21 Sep 2019 05:55  Phos  2.7     09-20  Mg     1.7     09-21          CAPILLARY BLOOD GLUCOSE              REVIEW OF SYSTEMS:  CONSTITUTIONAL: No fever, weight loss, or fatigue  EYES: No eye pain, visual disturbances, or discharge  ENMT:  No difficulty hearing, tinnitus, vertigo; No sinus or throat pain  NECK: No pain or stiffness  RESPIRATORY: No cough, wheezing, chills or hemoptysis; No shortness of breath  CARDIOVASCULAR: No chest pain, palpitations, dizziness, or leg swelling  GASTROINTESTINAL: No abdominal or epigastric pain. No nausea, vomiting, or hematemesis; No diarrhea or constipation. No melena or hematochezia.  GENITOURINARY: No dysuria, frequency, hematuria, or incontinence  NEUROLOGICAL: No headaches, memory loss, loss of strength, numbness, or tremors    Consultant(s) Notes Reviewed:  [x ] YES  [ ] NO    PHYSICAL EXAM:  GENERAL: NAD, well-groomed, well-developed,not in any distress ,  HEAD:  Atraumatic, Normocephalic  EYES: EOMI, PERRLA, conjunctiva and sclera clear  NECK: Supple, No JVD, Normal thyroid  NERVOUS SYSTEM:  Alert & Oriented X3, No focal deficit   CHEST/LUNG: Good air entry bilateral with no  rales, rhonchi, wheezing, or rubs  HEART: Regular rate and rhythm; No murmurs, rubs, or gallops  ABDOMEN: Soft, Nontender, Nondistended; Bowel sounds present  EXTREMITIES:  2+ Peripheral Pulses, No clubbing, cyanosis, or edema    Care Discussed with Consultants/Other Providers [ x] YES  [ ] NO

## 2019-09-21 NOTE — DISCHARGE NOTE PROVIDER - CARE PROVIDER_API CALL
Sunil Cueto (MD)  Cardiovascular Disease; Nuclear Cardiology  [address] [place] [address]  Phone: [phone]  Fax: [phone]  Follow Up Time:

## 2020-06-03 ENCOUNTER — APPOINTMENT (OUTPATIENT)
Dept: GASTROENTEROLOGY | Facility: CLINIC | Age: 80
End: 2020-06-03
Payer: MEDICARE

## 2020-06-03 PROCEDURE — 99442: CPT | Mod: 95

## 2020-06-03 NOTE — ASSESSMENT
[FreeTextEntry1] : Impression:\par \par #1 peptic ulcer disease-history of a walled off perforated prepyloric antral diverticulum. Healed and resolved-the patient is asymptomatic.\par \par #2 history of colonic polyps-history of multiple colonic polyps including frequent occurrence of metachronous lesions. The patient had 8 small polyps at his last colonoscopy on 7/11/2016 of which 4  proved to be adenomas, 2 were hyperplastic polyps and one was a descending colon sessile serrated polyp. Surveillance colonoscopy on 6/20/19 noted for removal of 13 colon polyps most of which were proximal in location and relatively small ranging in size from 4-8 mm with removal of a mix of adenoma, sessile serrated polyp and hyperplastic polyps.\par \par #3 sigmoid diverticulosis.\par \par #4 hematochezia- resolved at the current time. Attributed to hemorrhoids.\par \par #5 constipation-consistent with normal transit constipation of functional etiology.\par \par #6 obesity.\par \par #7 gastroesophageal reflux disease-asymptomatic on maintenance pantoprazole 40 mg daily.\par \par #8 varicose veins.\par \par #9 hypertension.\par \par #10 kidney stones.\par \par #11 status post BIH repair.\par

## 2020-06-03 NOTE — HISTORY OF PRESENT ILLNESS
[Medical Office: ([facility])___] : at the medical office located in  [Home] : at home, [unfilled] , at the time of the visit. [Spouse] : spouse [Verbal consent obtained from patient] : the patient, [unfilled] [FreeTextEntry1] : Melvin Herron was evaluated for telephonic revisit on 6/3/2020. \par \par COLONOSCOPY 8/3/12:      -The patient had undergone followup surveillance colonoscopy on 8/3/12. Seven small polyps were removed of which 4 were adenomas and 3 were hyperplastic polyps. Sigmoid diverticulosis was noted. Hemorrhoids were detected. An upper endoscopy was performed at the same session and the previously noted prepyloric antral diverticulum was again detected but was less prominent. A small 1 cm hiatus hernia was noted. Fundic gland gastric polyps were detected. A gastric biopsy tested negative for Helicobacter pylori.\par \par COLONOSCOPY 7/11/16:     -Colonoscopy was performed on 7/11/2016 at which time 8 colonic polyps were noted that were mostly small ranging in size from 3 to 5 mm. Most were in the ascending colon. The largest was a 10 mm proximal transverse colon sessile polyp. These polyps were 4 adenomas, 2 hyperplastic polyps and a descending colon sessile serrated polyp. Sigmoid diverticulosis was noted. Hemorrhoids were detected.\par \par ORV 3/8/17: Experienced scant episode of painless hematochezia. No blood noted on toilet paper after wiping but also on underwear and bed sheets. Has one formed bowel movement daily and observed no blood in the stool.\par \par ORV 5/5/17:    -Asymptomatic from a GI standpoint. Excellent appetite. Has gained 12 pounds over the past 6 months. Denies any complaints of dysphagia, heartburn, nausea, vomiting, abdominal pain, diarrhea, change in bowel habit or rectal bleeding.\par                        -Scant hematochezia previously reported on 3/8/17 resolved.\par                        -Patient remains on maintenance lansoprazole 30 mg daily.\par \par ORV 12/22/17:    -Patient is doing well from a GI standpoint. No complaints. Appetite and weight are stable. No current complaints of dysphagia, heartburn, nausea, vomiting or abdominal pain. Patient has regular bowel movements without any complaints of diarrhea, constipation, change in bowel habit or rectal bleeding.\par                            -Patient currently on lansoprazole 30 mg daily on maintenance basis. Patient reports no change to medications. Of note, the patient indicates that he ran out of nifedipine ER 90 mg daily which is one of his antihypertensive medications 2 weeks ago. \par \par ORV 6/22/18:    -Patient feels well. Asymptomatic from a GI standpoint. Appetite stable. Denies weight change. Denies any complaints of dysphagia, heartburn, nausea, vomiting or abdominal pain. Has regular daily bowel movements without any complaint of diarrhea, constipation, change in bowel habit or rectal bleeding.\par                          -Patient reports no change to medical status or medications since last examination. Patient remains in maintenance lansoprazole 30 mg daily.\par \par ORV 1/4/19:     -Presents for routine GI followup. Wife reports a few episodes of scant hematochezia over past several weeks. Primarily manifesting as red blood on the toilet paper or scant blood in the bowl. Patient has 2 formed bowel movements daily without any complaints of diarrhea, constipation, change in bowel habit, anal pain or melena. Appetite and weight are stable. Reports no complaints of heartburn, regurgitation, dysphagia, nausea, vomiting or abdominal pain.\par \par COLONOSCOPY 6/20/19:     -Surveillance colonoscopy was performed on 6/20/19. Total colonoscopy was performed to the cecum with ileoscopy. Normal terminal ileum. There were 13 colon polyps removed of which 11 were located between the cecum and hepatic flexure. The polyps were all small ranging in size from 4-8 mm. A mix of adenoma, sessile serrated polyp and hyperplastic polyps were removed. Diverticulosis was noted in the sigmoid colon, descending colon and cecum. The sigmoid diverticulosis with marked associated muscular hypertrophy and luminal narrowing. Followup surveillance colonoscopy is advised in 2 years in view of the large number of polyps. \par \par ORV 8/30/19:      -Doing well from GI standpoint. Appetite and weight stable. Reports no complaints of dysphagia, heartburn, nausea, vomiting or abdominal pain. Experiencing some mild constipation. Denies rectal bleeding. Patient otherwise reports no change in medical status or medication since last examination. Reports normal routine labs by PMD recently submitted.\par \par TELEPHONIC VISIT 6/3/2020:     -GI status discussed with patient's wife. Currently doing well. No complaints reported. Appetite and weight stable. No reported dysphagia, heartburn, nausea, vomiting or abdominal pain. Has regular bowel movements without issues of diarrhea, constipation, change in bowel habit or rectal bleeding. Doing well on pantoprazole 40 mg daily. No reported change in medical status or medications. Did experience a URI in 2/2020 with cough prompting antibiotic administration for 7 days but symptoms resolved. Plan to have followup laboratory testing by PMD.

## 2020-06-03 NOTE — CONSULT LETTER
[Dear  ___] : Dear  [unfilled], [Consult Letter:] : I had the pleasure of evaluating your patient, [unfilled]. [Please see my note below.] : Please see my note below. [Consult Closing:] : Thank you very much for allowing me to participate in the care of this patient.  If you have any questions, please do not hesitate to contact me. [Sincerely,] : Sincerely, [FreeTextEntry2] : Dr. Almas Adams [FreeTextEntry3] : Federico Longoria M.D.

## 2020-06-27 ENCOUNTER — RX RENEWAL (OUTPATIENT)
Age: 80
End: 2020-06-27

## 2020-09-20 ENCOUNTER — RX RENEWAL (OUTPATIENT)
Age: 80
End: 2020-09-20

## 2020-12-02 ENCOUNTER — APPOINTMENT (OUTPATIENT)
Dept: GASTROENTEROLOGY | Facility: CLINIC | Age: 80
End: 2020-12-02
Payer: MEDICARE

## 2020-12-02 VITALS
TEMPERATURE: 97.7 F | HEIGHT: 69 IN | BODY MASS INDEX: 35.55 KG/M2 | WEIGHT: 240 LBS | HEART RATE: 76 BPM | DIASTOLIC BLOOD PRESSURE: 100 MMHG | OXYGEN SATURATION: 98 % | SYSTOLIC BLOOD PRESSURE: 180 MMHG

## 2020-12-02 DIAGNOSIS — K27.9 PEPTIC ULCER, SITE UNSPECIFIED, UNSPECIFIED AS ACUTE OR CHRONIC, W/OUT HEMORRHAGE OR PERFORATION: ICD-10-CM

## 2020-12-02 PROCEDURE — 99214 OFFICE O/P EST MOD 30 MIN: CPT | Mod: 25

## 2020-12-02 PROCEDURE — 36415 COLL VENOUS BLD VENIPUNCTURE: CPT

## 2020-12-02 NOTE — REASON FOR VISIT
[Follow-Up: _____] : a [unfilled] follow-up visit [Spouse] : spouse [FreeTextEntry1] : for routine GI followup regarding history PUD and recent complaint of anal seepage.

## 2020-12-02 NOTE — ASSESSMENT
[FreeTextEntry1] : Impression:\par \par #1 anal seepage–suspect attributed to moderately large prolapsed internal hemorrhoids.\par \par #2 peptic ulcer disease-history of a walled off perforated prepyloric antral diverticulum. Healed and resolved-the patient is asymptomatic from this standpoint.\par \par #3 history of colonic polyps-history of multiple colonic polyps including frequent occurrence of metachronous lesions. The patient had 8 small polyps at his last colonoscopy on 7/11/2016 of which 4  proved to be adenomas, 2 were hyperplastic polyps and one was a descending colon sessile serrated polyp. Surveillance colonoscopy on 6/20/19 noted for removal of 13 colon polyps most of which were proximal in location and relatively small ranging in size from 4-8 mm with removal of a mix of adenoma, sessile serrated polyp and hyperplastic polyps.\par \par #4 sigmoid diverticulosis.\par \par #5 constipation-consistent with normal transit constipation of functional etiology.  Resolved at current time.\par \par #6 obesity.\par \par #7 gastroesophageal reflux disease-asymptomatic on maintenance pantoprazole 40 mg daily.\par \par #8 varicose veins.\par \par #9 hypertension.\par \par #10 kidney stones.\par \par #11 status post BIH repair.\par \par #12 dizziness.\par

## 2020-12-02 NOTE — HISTORY OF PRESENT ILLNESS
[FreeTextEntry1] : Melvin Herron was evaluated for office revisit on 12/2/2020. \par \par COLONOSCOPY 8/3/12:      -The patient had undergone followup surveillance colonoscopy on 8/3/12. Seven small polyps were removed of which 4 were adenomas and 3 were hyperplastic polyps. Sigmoid diverticulosis was noted. Hemorrhoids were detected. An upper endoscopy was performed at the same session and the previously noted prepyloric antral diverticulum was again detected but was less prominent. A small 1 cm hiatus hernia was noted. Fundic gland gastric polyps were detected. A gastric biopsy tested negative for Helicobacter pylori.\par \par COLONOSCOPY 7/11/16:     -Colonoscopy was performed on 7/11/2016 at which time 8 colonic polyps were noted that were mostly small ranging in size from 3 to 5 mm. Most were in the ascending colon. The largest was a 10 mm proximal transverse colon sessile polyp. These polyps were 4 adenomas, 2 hyperplastic polyps and a descending colon sessile serrated polyp. Sigmoid diverticulosis was noted. Hemorrhoids were detected.\par \par ORV 3/8/17: Experienced scant episode of painless hematochezia. No blood noted on toilet paper after wiping but also on underwear and bed sheets. Has one formed bowel movement daily and observed no blood in the stool.\par \par ORV 5/5/17:    -Asymptomatic from a GI standpoint. Excellent appetite. Has gained 12 pounds over the past 6 months. Denies any complaints of dysphagia, heartburn, nausea, vomiting, abdominal pain, diarrhea, change in bowel habit or rectal bleeding.\par                        -Scant hematochezia previously reported on 3/8/17 resolved.\par                        -Patient remains on maintenance lansoprazole 30 mg daily.\par \par ORV 12/22/17:    -Patient is doing well from a GI standpoint. No complaints. Appetite and weight are stable. No current complaints of dysphagia, heartburn, nausea, vomiting or abdominal pain. Patient has regular bowel movements without any complaints of diarrhea, constipation, change in bowel habit or rectal bleeding.\par                            -Patient currently on lansoprazole 30 mg daily on maintenance basis. Patient reports no change to medications. Of note, the patient indicates that he ran out of nifedipine ER 90 mg daily which is one of his antihypertensive medications 2 weeks ago. \par \par ORV 6/22/18:    -Patient feels well. Asymptomatic from a GI standpoint. Appetite stable. Denies weight change. Denies any complaints of dysphagia, heartburn, nausea, vomiting or abdominal pain. Has regular daily bowel movements without any complaint of diarrhea, constipation, change in bowel habit or rectal bleeding.\par                          -Patient reports no change to medical status or medications since last examination. Patient remains in maintenance lansoprazole 30 mg daily.\par \par ORV 1/4/19:     -Presents for routine GI followup. Wife reports a few episodes of scant hematochezia over past several weeks. Primarily manifesting as red blood on the toilet paper or scant blood in the bowl. Patient has 2 formed bowel movements daily without any complaints of diarrhea, constipation, change in bowel habit, anal pain or melena. Appetite and weight are stable. Reports no complaints of heartburn, regurgitation, dysphagia, nausea, vomiting or abdominal pain.\par \par COLONOSCOPY 6/20/19:     -Surveillance colonoscopy was performed on 6/20/19. Total colonoscopy was performed to the cecum with ileoscopy. Normal terminal ileum. There were 13 colon polyps removed of which 11 were located between the cecum and hepatic flexure. The polyps were all small ranging in size from 4-8 mm. A mix of adenoma, sessile serrated polyp and hyperplastic polyps were removed. Diverticulosis was noted in the sigmoid colon, descending colon and cecum. The sigmoid diverticulosis with marked associated muscular hypertrophy and luminal narrowing. Followup surveillance colonoscopy is advised in 2 years in view of the large number of polyps. \par \par ORV 8/30/19:      -Doing well from GI standpoint. Appetite and weight stable. Reports no complaints of dysphagia, heartburn, nausea, vomiting or abdominal pain. Experiencing some mild constipation. Denies rectal bleeding. Patient otherwise reports no change in medical status or medication since last examination. Reports normal routine labs by PMD recently submitted.\[facility] \par TELEPHONIC VISIT 6/3/2020:     -GI status discussed with patient's wife. Currently doing well. No complaints reported. Appetite and weight stable. No reported dysphagia, heartburn, nausea, vomiting or abdominal pain. Has regular bowel movements without issues of diarrhea, constipation, change in bowel habit or rectal bleeding. Doing well on pantoprazole 40 mg daily. No reported change in medical status or medications. Did experience a URI in 2/2020 with cough prompting antibiotic administration for 7 days but symptoms resolved. Plan to have followup laboratory testing by PMD.\par \par ORV 12/2/2020:     -Overall, reports doing well from GI standpoint.  Only complaint is an intermittent "leak" described as anal seepage of light yellow fluid staining undergarments.  Noticed by wife over the past 3 months with episodes occurring intermittently approximately every 10 to 15 days.  Mostly noted upon awakening when he senses a "wet feeling".  Described as light yellow and with mucous.  No blood.  Not urine.\par                               -Otherwise doing well from GI standpoint.  Denies fever.  Appetite and weight stable.  Denies nausea, vomiting, dysphagia, heartburn or any abdominal pain.  He has 1 formed bowel movement daily without any complaints of diarrhea, constipation, change in bowel habit or rectal bleeding.  Sometimes after eating certain sauces he might have urgency.\par                                -Patient reports no change in medical history or medications.  However, he indicates having run out of nifedipine 2 weeks ago and is still awaiting receipt from mail order.  Also ran out of meclizine that he uses sparingly for dizziness.

## 2020-12-02 NOTE — PHYSICAL EXAM
[General Appearance - Alert] : alert [General Appearance - In No Acute Distress] : in no acute distress [General Appearance - Well Developed] : well developed [General Appearance - Well-Appearing] : healthy appearing [Sclera] : the sclera and conjunctiva were normal [Neck Appearance] : the appearance of the neck was normal [Respiration, Rhythm And Depth] : normal respiratory rhythm and effort [Exaggerated Use Of Accessory Muscles For Inspiration] : no accessory muscle use [Auscultation Breath Sounds / Voice Sounds] : lungs were clear to auscultation bilaterally [Heart Rate And Rhythm] : heart rate was normal and rhythm regular [Heart Sounds] : normal S1 and S2 [Heart Sounds Gallop] : no gallops [Murmurs] : no murmurs [Heart Sounds Pericardial Friction Rub] : no pericardial rub [Bowel Sounds] : normal bowel sounds [Abdomen Soft] : soft [Abdomen Tenderness] : non-tender [] : no hepato-splenomegaly [Abdomen Mass (___ Cm)] : no abdominal mass palpated [Normal Sphincter Tone] : normal sphincter tone [No Rectal Mass] : no rectal mass [Internal Hemorrhoid] : internal hemorrhoids [Prostate Tenderness] : the prostate was not tender [Cervical Lymph Nodes Enlarged Posterior Bilaterally] : posterior cervical [Cervical Lymph Nodes Enlarged Anterior Bilaterally] : anterior cervical [Supraclavicular Lymph Nodes Enlarged Bilaterally] : supraclavicular [Abnormal Walk] : normal gait [Nail Clubbing] : no clubbing  or cyanosis of the fingernails [Skin Color & Pigmentation] : normal skin color and pigmentation [Impaired Insight] : insight and judgment were intact [Affect] : the affect was normal [Mood] : the mood was normal [FreeTextEntry1] : Prolapsed internal hemorrhoid noted at left lateral position–easily reduced.  Not thrombosed.  Large smooth prostate palpated. No mass. Blood not detected.

## 2020-12-04 LAB
ALBUMIN SERPL ELPH-MCNC: 4.3 G/DL
ALP BLD-CCNC: 86 U/L
ALT SERPL-CCNC: 21 U/L
ANION GAP SERPL CALC-SCNC: 12 MMOL/L
AST SERPL-CCNC: 17 U/L
BASOPHILS # BLD AUTO: 0.05 K/UL
BASOPHILS NFR BLD AUTO: 0.6 %
BILIRUB SERPL-MCNC: 0.9 MG/DL
BUN SERPL-MCNC: 16 MG/DL
CALCIUM SERPL-MCNC: 7.3 MG/DL
CHLORIDE SERPL-SCNC: 103 MMOL/L
CO2 SERPL-SCNC: 28 MMOL/L
CREAT SERPL-MCNC: 0.95 MG/DL
EOSINOPHIL # BLD AUTO: 0.09 K/UL
EOSINOPHIL NFR BLD AUTO: 1.1 %
GLUCOSE SERPL-MCNC: 78 MG/DL
HCT VFR BLD CALC: 43.9 %
HGB BLD-MCNC: 13.9 G/DL
IMM GRANULOCYTES NFR BLD AUTO: 0.5 %
LYMPHOCYTES # BLD AUTO: 1.5 K/UL
LYMPHOCYTES NFR BLD AUTO: 18 %
MAN DIFF?: NORMAL
MCHC RBC-ENTMCNC: 28.5 PG
MCHC RBC-ENTMCNC: 31.7 GM/DL
MCV RBC AUTO: 90 FL
MONOCYTES # BLD AUTO: 0.74 K/UL
MONOCYTES NFR BLD AUTO: 8.9 %
NEUTROPHILS # BLD AUTO: 5.92 K/UL
NEUTROPHILS NFR BLD AUTO: 70.9 %
PLATELET # BLD AUTO: 236 K/UL
POTASSIUM SERPL-SCNC: 4.3 MMOL/L
PROT SERPL-MCNC: 7 G/DL
RBC # BLD: 4.88 M/UL
RBC # FLD: 13.2 %
SODIUM SERPL-SCNC: 143 MMOL/L
WBC # FLD AUTO: 8.34 K/UL

## 2021-01-08 ENCOUNTER — APPOINTMENT (OUTPATIENT)
Dept: CARDIOLOGY | Facility: CLINIC | Age: 81
End: 2021-01-08
Payer: MEDICARE

## 2021-01-08 ENCOUNTER — NON-APPOINTMENT (OUTPATIENT)
Age: 81
End: 2021-01-08

## 2021-01-08 VITALS
HEIGHT: 69 IN | BODY MASS INDEX: 34.51 KG/M2 | DIASTOLIC BLOOD PRESSURE: 71 MMHG | WEIGHT: 233 LBS | SYSTOLIC BLOOD PRESSURE: 114 MMHG | TEMPERATURE: 96.3 F | OXYGEN SATURATION: 95 % | HEART RATE: 84 BPM

## 2021-01-08 PROCEDURE — 93000 ELECTROCARDIOGRAM COMPLETE: CPT

## 2021-01-08 PROCEDURE — 99204 OFFICE O/P NEW MOD 45 MIN: CPT

## 2021-01-08 RX ORDER — NIFEDIPINE 90 MG/1
90 TABLET, EXTENDED RELEASE ORAL DAILY
Qty: 90 | Refills: 0 | Status: DISCONTINUED | COMMUNITY
Start: 2017-12-22 | End: 2021-01-08

## 2021-01-08 RX ORDER — PANTOPRAZOLE 40 MG/1
40 TABLET, DELAYED RELEASE ORAL
Qty: 90 | Refills: 3 | Status: DISCONTINUED | COMMUNITY
Start: 2019-06-26 | End: 2021-01-08

## 2021-01-08 RX ORDER — MEMANTINE HYDROCHLORIDE 5 MG/1
5 TABLET ORAL
Refills: 0 | Status: DISCONTINUED | COMMUNITY
End: 2021-01-08

## 2021-01-08 RX ORDER — PANTOPRAZOLE 40 MG/1
40 TABLET, DELAYED RELEASE ORAL
Qty: 90 | Refills: 3 | Status: DISCONTINUED | COMMUNITY
Start: 2019-08-30 | End: 2021-01-08

## 2021-01-08 NOTE — PHYSICAL EXAM
[General Appearance - Well Developed] : well developed [Normal Appearance] : normal appearance [General Appearance - Well Nourished] : well nourished [No Deformities] : no deformities [Normal Conjunctiva] : the conjunctiva exhibited no abnormalities [Normal Oral Mucosa] : normal oral mucosa [Normal Oropharynx] : normal oropharynx [JVD Elevated _____cm] : JVD elevated [unfilled] ~U cm above clavicle [Normal] : normal [No Precordial Heave] : no precordial heave was noted [Normal Rate] : normal [Rhythm Regular] : regular [Normal S1] : normal S1 [Normal S2] : normal S2 [No Murmur] : no murmurs heard [2+] : left 2+ [No Pitting Edema] : no pitting edema present [Respiration, Rhythm And Depth] : normal respiratory rhythm and effort [Exaggerated Use Of Accessory Muscles For Inspiration] : no accessory muscle use [Auscultation Breath Sounds / Voice Sounds] : lungs were clear to auscultation bilaterally [Bowel Sounds] : normal bowel sounds [Abdomen Soft] : soft [Abdomen Tenderness] : non-tender [Abnormal Walk] : normal gait [Nail Clubbing] : no clubbing of the fingernails [Cyanosis, Localized] : no localized cyanosis [Skin Color & Pigmentation] : normal skin color and pigmentation [Skin Turgor] : normal skin turgor [] : no rash [Oriented To Time, Place, And Person] : oriented to person, place, and time [Impaired Insight] : insight and judgment were intact [No Anxiety] : not feeling anxious

## 2021-01-08 NOTE — HISTORY OF PRESENT ILLNESS
[FreeTextEntry1] : 81 y/o male with H/O Hypertension, hyperlipidemia has not had regular medical care for some time now, comes to establish care. Patient says being Short of breath at rest episodically. No recent cardiac work up.

## 2021-01-08 NOTE — DISCUSSION/SUMMARY
[FreeTextEntry1] : 81 y/o male with H/O Hypertension, hyperlipidemia has not had regular medical care for some time now, comes to establish care. Patient says being Short of breath at rest episodically. No recent cardiac work up.  Schedule echo. Ischemic work up accordingly.

## 2021-01-08 NOTE — REVIEW OF SYSTEMS
[Fever] : no fever [Chills] : no chills [Feeling Fatigued] : not feeling fatigued [Blurry Vision] : no blurred vision [Eyeglasses] : currently wearing eyeglasses [Earache] : no earache [Mouth Sores] : no mouth sores [Shortness Of Breath] : shortness of breath [Dyspnea on exertion] : dyspnea during exertion [Chest Pain] : no chest pain [Palpitations] : no palpitations [Cough] : no cough [Abdominal Pain] : no abdominal pain [Heartburn] : no heartburn [Dysphagia] : no dysphagia [Urinary Frequency] : no change in urinary frequency [Impotence] : no impotence [Joint Pain] : no joint pain [Muscle Cramps] : no muscle cramps [Skin: A Rash] : no rash: [Skin Lesions] : no skin lesions [Dizziness] : no dizziness [Convulsions] : no convulsions [Confusion] : no confusion was observed [Anxiety] : no anxiety [Excessive Thirst] : no polydipsia [Easy Bleeding] : no tendency for easy bleeding [Easy Bruising] : no tendency for easy bruising

## 2021-04-09 ENCOUNTER — NON-APPOINTMENT (OUTPATIENT)
Age: 81
End: 2021-04-09

## 2021-04-09 ENCOUNTER — APPOINTMENT (OUTPATIENT)
Dept: CARDIOLOGY | Facility: CLINIC | Age: 81
End: 2021-04-09
Payer: MEDICARE

## 2021-04-09 VITALS
TEMPERATURE: 97.4 F | HEIGHT: 68 IN | BODY MASS INDEX: 34.92 KG/M2 | HEART RATE: 68 BPM | DIASTOLIC BLOOD PRESSURE: 88 MMHG | SYSTOLIC BLOOD PRESSURE: 163 MMHG | OXYGEN SATURATION: 95 % | WEIGHT: 230.38 LBS

## 2021-04-09 PROCEDURE — 93000 ELECTROCARDIOGRAM COMPLETE: CPT

## 2021-04-09 PROCEDURE — 99214 OFFICE O/P EST MOD 30 MIN: CPT

## 2021-04-09 RX ORDER — MECLIZINE HYDROCHLORIDE 25 MG/1
25 TABLET ORAL 3 TIMES DAILY
Qty: 21 | Refills: 0 | Status: ACTIVE | COMMUNITY
Start: 2020-12-02 | End: 1900-01-01

## 2021-04-09 NOTE — DISCUSSION/SUMMARY
[FreeTextEntry1] : 81 y/o male with H/O Hypertension, hyperlipidemia, Short of breath at rest episodically. Cardiac work up ordered, not done due to covid situation. No new complaints. Not regular with BP meds.  Reinforced compliance. Echo/ CD  pending. Ischemic work up as per echo results. Follow up in three months. Meds refilled.

## 2021-04-09 NOTE — PHYSICAL EXAM
[General Appearance - Well Developed] : well developed [Normal Appearance] : normal appearance [General Appearance - Well Nourished] : well nourished [No Deformities] : no deformities [Normal Conjunctiva] : the conjunctiva exhibited no abnormalities [Normal Oral Mucosa] : normal oral mucosa [Normal Oropharynx] : normal oropharynx [JVD Elevated _____cm] : JVD elevated [unfilled] ~U cm above clavicle [Respiration, Rhythm And Depth] : normal respiratory rhythm and effort [Exaggerated Use Of Accessory Muscles For Inspiration] : no accessory muscle use [Auscultation Breath Sounds / Voice Sounds] : lungs were clear to auscultation bilaterally [Normal] : normal [No Precordial Heave] : no precordial heave was noted [Normal Rate] : normal [Rhythm Regular] : regular [Normal S1] : normal S1 [Normal S2] : normal S2 [No Murmur] : no murmurs heard [2+] : left 2+ [No Pitting Edema] : no pitting edema present [Bowel Sounds] : normal bowel sounds [Abdomen Soft] : soft [Abdomen Tenderness] : non-tender [Abnormal Walk] : normal gait [Nail Clubbing] : no clubbing of the fingernails [Cyanosis, Localized] : no localized cyanosis [Skin Color & Pigmentation] : normal skin color and pigmentation [Skin Turgor] : normal skin turgor [] : no rash [Oriented To Time, Place, And Person] : oriented to person, place, and time [Impaired Insight] : insight and judgment were intact [No Anxiety] : not feeling anxious

## 2021-04-09 NOTE — HISTORY OF PRESENT ILLNESS
[FreeTextEntry1] : 81 y/o male with H/O Hypertension, hyperlipidemia, Short of breath at rest episodically. Cardiac work up ordered, not done due to covid situation. No new complaints. Not regular with BP meds.

## 2021-05-04 ENCOUNTER — APPOINTMENT (OUTPATIENT)
Dept: GASTROENTEROLOGY | Facility: CLINIC | Age: 81
End: 2021-05-04
Payer: MEDICARE

## 2021-05-04 VITALS
SYSTOLIC BLOOD PRESSURE: 142 MMHG | RESPIRATION RATE: 16 BRPM | HEART RATE: 75 BPM | WEIGHT: 230 LBS | DIASTOLIC BLOOD PRESSURE: 84 MMHG | OXYGEN SATURATION: 97 % | TEMPERATURE: 97.1 F | HEIGHT: 68 IN | BODY MASS INDEX: 34.86 KG/M2

## 2021-05-04 VITALS
HEIGHT: 68 IN | RESPIRATION RATE: 16 BRPM | BODY MASS INDEX: 34.86 KG/M2 | SYSTOLIC BLOOD PRESSURE: 142 MMHG | OXYGEN SATURATION: 97 % | TEMPERATURE: 97.1 F | HEART RATE: 75 BPM | WEIGHT: 230 LBS | DIASTOLIC BLOOD PRESSURE: 84 MMHG

## 2021-05-04 DIAGNOSIS — K64.9 UNSPECIFIED HEMORRHOIDS: ICD-10-CM

## 2021-05-04 DIAGNOSIS — K52.9 NONINFECTIVE GASTROENTERITIS AND COLITIS, UNSPECIFIED: ICD-10-CM

## 2021-05-04 PROCEDURE — 36415 COLL VENOUS BLD VENIPUNCTURE: CPT

## 2021-05-04 PROCEDURE — 99213 OFFICE O/P EST LOW 20 MIN: CPT | Mod: 25

## 2021-05-04 NOTE — HISTORY OF PRESENT ILLNESS
[FreeTextEntry1] : Melvin Herron was evaluated for office revisit on 5/3/2021.\par \par Patient presents for acute office revisit regarding episode of abdominal discomfort, vomiting and diarrhea.\par \par COLONOSCOPY 8/3/12:      -The patient had undergone followup surveillance colonoscopy on 8/3/12. Seven small polyps were removed of which 4 were adenomas and 3 were hyperplastic polyps. Sigmoid diverticulosis was noted. Hemorrhoids were detected. An upper endoscopy was performed at the same session and the previously noted prepyloric antral diverticulum was again detected but was less prominent. A small 1 cm hiatus hernia was noted. Fundic gland gastric polyps were detected. A gastric biopsy tested negative for Helicobacter pylori.\par \par COLONOSCOPY 7/11/16:     -Colonoscopy was performed on 7/11/2016 at which time 8 colonic polyps were noted that were mostly small ranging in size from 3 to 5 mm. Most were in the ascending colon. The largest was a 10 mm proximal transverse colon sessile polyp. These polyps were 4 adenomas, 2 hyperplastic polyps and a descending colon sessile serrated polyp. Sigmoid diverticulosis was noted. Hemorrhoids were detected.\par \par ORV 3/8/17: Experienced scant episode of painless hematochezia. No blood noted on toilet paper after wiping but also on underwear and bed sheets. Has one formed bowel movement daily and observed no blood in the stool.\par \par ORV 5/5/17:    -Asymptomatic from a GI standpoint. Excellent appetite. Has gained 12 pounds over the past 6 months. Denies any complaints of dysphagia, heartburn, nausea, vomiting, abdominal pain, diarrhea, change in bowel habit or rectal bleeding.\par                        -Scant hematochezia previously reported on 3/8/17 resolved.\par                        -Patient remains on maintenance lansoprazole 30 mg daily.\par \par ORV 12/22/17:    -Patient is doing well from a GI standpoint. No complaints. Appetite and weight are stable. No current complaints of dysphagia, heartburn, nausea, vomiting or abdominal pain. Patient has regular bowel movements without any complaints of diarrhea, constipation, change in bowel habit or rectal bleeding.\par                            -Patient currently on lansoprazole 30 mg daily on maintenance basis. Patient reports no change to medications. Of note, the patient indicates that he ran out of nifedipine ER 90 mg daily which is one of his antihypertensive medications 2 weeks ago. \par \par ORV 6/22/18:    -Patient feels well. Asymptomatic from a GI standpoint. Appetite stable. Denies weight change. Denies any complaints of dysphagia, heartburn, nausea, vomiting or abdominal pain. Has regular daily bowel movements without any complaint of diarrhea, constipation, change in bowel habit or rectal bleeding.\par                          -Patient reports no change to medical status or medications since last examination. Patient remains in maintenance lansoprazole 30 mg daily.\par \par ORV 1/4/19:     -Presents for routine GI followup. Wife reports a few episodes of scant hematochezia over past several weeks. Primarily manifesting as red blood on the toilet paper or scant blood in the bowl. Patient has 2 formed bowel movements daily without any complaints of diarrhea, constipation, change in bowel habit, anal pain or melena. Appetite and weight are stable. Reports no complaints of heartburn, regurgitation, dysphagia, nausea, vomiting or abdominal pain.\par \par COLONOSCOPY 6/20/19:     -Surveillance colonoscopy was performed on 6/20/19. Total colonoscopy was performed to the cecum with ileoscopy. Normal terminal ileum. There were 13 colon polyps removed of which 11 were located between the cecum and hepatic flexure. The polyps were all small ranging in size from 4-8 mm. A mix of adenoma, sessile serrated polyp and hyperplastic polyps were removed. Diverticulosis was noted in the sigmoid colon, descending colon and cecum. The sigmoid diverticulosis with marked associated muscular hypertrophy and luminal narrowing. Followup surveillance colonoscopy is advised in 2 years in view of the large number of polyps. \par \par ORV 8/30/19:      -Doing well from GI standpoint. Appetite and weight stable. Reports no complaints of dysphagia, heartburn, nausea, vomiting or abdominal pain. Experiencing some mild constipation. Denies rectal bleeding. Patient otherwise reports no change in medical status or medication since last examination. Reports normal routine labs by PMD recently submitted.\par \par TELEPHONIC VISIT 6/3/2020:     -GI status discussed with patient's wife. Currently doing well. No complaints reported. Appetite and weight stable. No reported dysphagia, heartburn, nausea, vomiting or abdominal pain. Has regular bowel movements without issues of diarrhea, constipation, change in bowel habit or rectal bleeding. Doing well on pantoprazole 40 mg daily. No reported change in medical status or medications. Did experience a URI in 2/2020 with cough prompting antibiotic administration for 7 days but symptoms resolved. Plan to have followup laboratory testing by PMD.\par \par ORV 12/2/2020:     -Overall, reports doing well from GI standpoint.  Only complaint is an intermittent "leak" described as anal seepage of light yellow fluid staining undergarments.  Noticed by wife over the past 3 months with episodes occurring intermittently approximately every 10 to 15 days.  Mostly noted upon awakening when he senses a "wet feeling".  Described as light yellow and with mucous.  No blood.  Not urine.\par                               -Otherwise doing well from GI standpoint.  Denies fever.  Appetite and weight stable.  Denies nausea, vomiting, dysphagia, heartburn or any abdominal pain.  He has 1 formed bowel movement daily without any complaints of diarrhea, constipation, change in bowel habit or rectal bleeding.  Sometimes after eating certain sauces he might have urgency.\par                                -Patient reports no change in medical history or medications.  However, he indicates having run out of nifedipine 2 weeks ago and is still awaiting receipt from mail order.  Also ran out of meclizine that he uses sparingly for dizziness.\par \par ORV 5/3/2021:     -Presents for acute office revisit regarding self-limited episode of nonbloody emesis, abdominal discomfort and diarrhea which occurred 1 month ago.  Symptoms lasted 3 days.  No report of fever or associated GI bleeding.  Now back to his baseline.  Wife reports appetite somewhat decreased.  Current weight 10 pounds less than prior baseline.  No current complaints of fever, nausea, vomiting or abdominal pain.  Typically has 1-2 bowel movements daily.  Wife reporting he can experience loose stools.  Occasionally observes scant red blood when wiping.  Problem of foul-smelling anal seepage persist.\par                              -Wife reports no change to medical history or medications.  Currently on pantoprazole 40 mg daily.

## 2021-05-04 NOTE — ASSESSMENT
[FreeTextEntry1] : Impression:\par \par #1 acute gastroenteritis (4/2021)–presents for acute office revisit regarding self-limited episode of nonbloody emesis, abdominal discomfort and nonbloody diarrhea that occurred 1 month ago.  Symptoms lasted 3 days and is now back to baseline.  Acute gastroenteritis of foodborne or infectious etiology suspected.  Either way, as noted, symptoms resolved and he is back to baseline.\par \par #1 anal seepage–suspect attributed to large prolapsed internal hemorrhoids.\par \par #2 peptic ulcer disease-history of a walled off perforated prepyloric antral diverticulum. Healed and resolved-the patient is asymptomatic from this standpoint.\par \par #3 history of colonic polyps-history of multiple colonic polyps including frequent occurrence of metachronous lesions. The patient had 8 small polyps at his last colonoscopy on 7/11/2016 of which 4  proved to be adenomas, 2 were hyperplastic polyps and one was a descending colon sessile serrated polyp. Surveillance colonoscopy on 6/20/19 noted for removal of 13 colon polyps most of which were proximal in location and relatively small ranging in size from 4-8 mm with removal of a mix of adenoma, sessile serrated polyp and hyperplastic polyps.\par \par #4 sigmoid diverticulosis.\par \par #5 constipation-consistent with normal transit constipation of functional etiology.  Resolved at current time.\par \par #6 obesity.\par \par #7 gastroesophageal reflux disease-asymptomatic on maintenance pantoprazole 40 mg daily.\par \par #8 varicose veins.\par \par #9 hypertension.\par \par #10 kidney stones.\par \par #11 status post BIH repair.\par \par #12 dizziness.\par

## 2021-05-04 NOTE — REASON FOR VISIT
[Acute] : an acute visit [Spouse] : spouse [FreeTextEntry1] : for evaluation of recent episode of abdominal discomfort, vomiting and diarrhea.

## 2021-05-04 NOTE — PHYSICAL EXAM
[General Appearance - In No Acute Distress] : in no acute distress [General Appearance - Alert] : alert [General Appearance - Well Developed] : well developed [General Appearance - Well-Appearing] : healthy appearing [Sclera] : the sclera and conjunctiva were normal [Neck Appearance] : the appearance of the neck was normal [Respiration, Rhythm And Depth] : normal respiratory rhythm and effort [Exaggerated Use Of Accessory Muscles For Inspiration] : no accessory muscle use [Auscultation Breath Sounds / Voice Sounds] : lungs were clear to auscultation bilaterally [Heart Rate And Rhythm] : heart rate was normal and rhythm regular [Heart Sounds] : normal S1 and S2 [Heart Sounds Gallop] : no gallops [Murmurs] : no murmurs [Heart Sounds Pericardial Friction Rub] : no pericardial rub [Bowel Sounds] : normal bowel sounds [Abdomen Soft] : soft [Abdomen Tenderness] : non-tender [] : no hepato-splenomegaly [Abdomen Mass (___ Cm)] : no abdominal mass palpated [Normal Sphincter Tone] : normal sphincter tone [No Rectal Mass] : no rectal mass [Internal Hemorrhoid] : internal hemorrhoids [Prostate Tenderness] : the prostate was not tender [Cervical Lymph Nodes Enlarged Posterior Bilaterally] : posterior cervical [Cervical Lymph Nodes Enlarged Anterior Bilaterally] : anterior cervical [Supraclavicular Lymph Nodes Enlarged Bilaterally] : supraclavicular [Nail Clubbing] : no clubbing  or cyanosis of the fingernails [Skin Color & Pigmentation] : normal skin color and pigmentation [Impaired Insight] : insight and judgment were intact [Affect] : the affect was normal [Mood] : the mood was normal [FreeTextEntry1] : Ambulates with a cane.

## 2021-05-06 LAB
ALBUMIN SERPL ELPH-MCNC: 4.2 G/DL
ALP BLD-CCNC: 82 U/L
ALT SERPL-CCNC: 18 U/L
ANION GAP SERPL CALC-SCNC: 14 MMOL/L
AST SERPL-CCNC: 19 U/L
BASOPHILS # BLD AUTO: 0.05 K/UL
BASOPHILS NFR BLD AUTO: 0.5 %
BILIRUB SERPL-MCNC: 0.9 MG/DL
BUN SERPL-MCNC: 14 MG/DL
CALCIUM SERPL-MCNC: 7.6 MG/DL
CHLORIDE SERPL-SCNC: 99 MMOL/L
CO2 SERPL-SCNC: 28 MMOL/L
CREAT SERPL-MCNC: 1.04 MG/DL
EOSINOPHIL # BLD AUTO: 0.17 K/UL
EOSINOPHIL NFR BLD AUTO: 1.6 %
GLUCOSE SERPL-MCNC: 100 MG/DL
HCT VFR BLD CALC: 46.7 %
HGB BLD-MCNC: 14.8 G/DL
IMM GRANULOCYTES NFR BLD AUTO: 0.6 %
LYMPHOCYTES # BLD AUTO: 2.26 K/UL
LYMPHOCYTES NFR BLD AUTO: 21.9 %
MAN DIFF?: NORMAL
MCHC RBC-ENTMCNC: 28.8 PG
MCHC RBC-ENTMCNC: 31.7 GM/DL
MCV RBC AUTO: 90.9 FL
MONOCYTES # BLD AUTO: 0.9 K/UL
MONOCYTES NFR BLD AUTO: 8.7 %
NEUTROPHILS # BLD AUTO: 6.89 K/UL
NEUTROPHILS NFR BLD AUTO: 66.7 %
PLATELET # BLD AUTO: 260 K/UL
POTASSIUM SERPL-SCNC: 3.9 MMOL/L
PROT SERPL-MCNC: 7.3 G/DL
RBC # BLD: 5.14 M/UL
RBC # FLD: 13.4 %
SODIUM SERPL-SCNC: 141 MMOL/L
TSH SERPL-ACNC: 0.96 UIU/ML
WBC # FLD AUTO: 10.33 K/UL

## 2021-06-28 ENCOUNTER — INPATIENT (INPATIENT)
Facility: HOSPITAL | Age: 81
LOS: 7 days | Discharge: HOME HEALTH | End: 2021-07-06
Attending: LEGAL MEDICINE | Admitting: LEGAL MEDICINE
Payer: MEDICARE

## 2021-06-28 VITALS
SYSTOLIC BLOOD PRESSURE: 132 MMHG | HEART RATE: 80 BPM | TEMPERATURE: 98 F | OXYGEN SATURATION: 99 % | RESPIRATION RATE: 18 BRPM | HEIGHT: 70 IN | DIASTOLIC BLOOD PRESSURE: 75 MMHG

## 2021-06-28 DIAGNOSIS — R42 DIZZINESS AND GIDDINESS: ICD-10-CM

## 2021-06-28 DIAGNOSIS — R11.2 NAUSEA WITH VOMITING, UNSPECIFIED: ICD-10-CM

## 2021-06-28 DIAGNOSIS — E83.51 HYPOCALCEMIA: ICD-10-CM

## 2021-06-28 DIAGNOSIS — R53.1 WEAKNESS: ICD-10-CM

## 2021-06-28 DIAGNOSIS — N20.0 CALCULUS OF KIDNEY: Chronic | ICD-10-CM

## 2021-06-28 DIAGNOSIS — N40.0 BENIGN PROSTATIC HYPERPLASIA WITHOUT LOWER URINARY TRACT SYMPTOMS: ICD-10-CM

## 2021-06-28 DIAGNOSIS — Z29.9 ENCOUNTER FOR PROPHYLACTIC MEASURES, UNSPECIFIED: ICD-10-CM

## 2021-06-28 DIAGNOSIS — E78.5 HYPERLIPIDEMIA, UNSPECIFIED: ICD-10-CM

## 2021-06-28 DIAGNOSIS — E87.8 OTHER DISORDERS OF ELECTROLYTE AND FLUID BALANCE, NOT ELSEWHERE CLASSIFIED: ICD-10-CM

## 2021-06-28 DIAGNOSIS — I10 ESSENTIAL (PRIMARY) HYPERTENSION: ICD-10-CM

## 2021-06-28 LAB
ALBUMIN SERPL ELPH-MCNC: 4 G/DL — SIGNIFICANT CHANGE UP (ref 3.3–5)
ALP SERPL-CCNC: 83 U/L — SIGNIFICANT CHANGE UP (ref 40–120)
ALT FLD-CCNC: 15 U/L — SIGNIFICANT CHANGE UP (ref 4–41)
ANION GAP SERPL CALC-SCNC: 17 MMOL/L — HIGH (ref 7–14)
ANION GAP SERPL CALC-SCNC: 17 MMOL/L — HIGH (ref 7–14)
ANION GAP SERPL CALC-SCNC: 18 MMOL/L — HIGH (ref 7–14)
AST SERPL-CCNC: 23 U/L — SIGNIFICANT CHANGE UP (ref 4–40)
BASOPHILS # BLD AUTO: 0.02 K/UL — SIGNIFICANT CHANGE UP (ref 0–0.2)
BASOPHILS NFR BLD AUTO: 0.2 % — SIGNIFICANT CHANGE UP (ref 0–2)
BILIRUB SERPL-MCNC: 1.3 MG/DL — HIGH (ref 0.2–1.2)
BLOOD GAS VENOUS COMPREHENSIVE RESULT: SIGNIFICANT CHANGE UP
BLOOD GAS VENOUS COMPREHENSIVE RESULT: SIGNIFICANT CHANGE UP
BUN SERPL-MCNC: 15 MG/DL — SIGNIFICANT CHANGE UP (ref 7–23)
BUN SERPL-MCNC: 16 MG/DL — SIGNIFICANT CHANGE UP (ref 7–23)
BUN SERPL-MCNC: 16 MG/DL — SIGNIFICANT CHANGE UP (ref 7–23)
CA-I BLD-SCNC: 0.84 MMOL/L — LOW (ref 1.15–1.29)
CALCIUM SERPL-MCNC: 6.2 MG/DL — CRITICAL LOW (ref 8.4–10.5)
CALCIUM SERPL-MCNC: 6.7 MG/DL — LOW (ref 8.4–10.5)
CALCIUM SERPL-MCNC: 6.9 MG/DL — LOW (ref 8.4–10.5)
CHLORIDE SERPL-SCNC: 97 MMOL/L — LOW (ref 98–107)
CHLORIDE SERPL-SCNC: 99 MMOL/L — SIGNIFICANT CHANGE UP (ref 98–107)
CHLORIDE SERPL-SCNC: 99 MMOL/L — SIGNIFICANT CHANGE UP (ref 98–107)
CO2 SERPL-SCNC: 25 MMOL/L — SIGNIFICANT CHANGE UP (ref 22–31)
CO2 SERPL-SCNC: 26 MMOL/L — SIGNIFICANT CHANGE UP (ref 22–31)
CO2 SERPL-SCNC: 26 MMOL/L — SIGNIFICANT CHANGE UP (ref 22–31)
CREAT SERPL-MCNC: 0.95 MG/DL — SIGNIFICANT CHANGE UP (ref 0.5–1.3)
CREAT SERPL-MCNC: 0.96 MG/DL — SIGNIFICANT CHANGE UP (ref 0.5–1.3)
CREAT SERPL-MCNC: 0.97 MG/DL — SIGNIFICANT CHANGE UP (ref 0.5–1.3)
EOSINOPHIL # BLD AUTO: 0.01 K/UL — SIGNIFICANT CHANGE UP (ref 0–0.5)
EOSINOPHIL NFR BLD AUTO: 0.1 % — SIGNIFICANT CHANGE UP (ref 0–6)
GLUCOSE SERPL-MCNC: 101 MG/DL — HIGH (ref 70–99)
GLUCOSE SERPL-MCNC: 92 MG/DL — SIGNIFICANT CHANGE UP (ref 70–99)
GLUCOSE SERPL-MCNC: 99 MG/DL — SIGNIFICANT CHANGE UP (ref 70–99)
HCT VFR BLD CALC: 43.6 % — SIGNIFICANT CHANGE UP (ref 39–50)
HGB BLD-MCNC: 14.5 G/DL — SIGNIFICANT CHANGE UP (ref 13–17)
IANC: 7.6 K/UL — SIGNIFICANT CHANGE UP (ref 1.5–8.5)
IMM GRANULOCYTES NFR BLD AUTO: 0.4 % — SIGNIFICANT CHANGE UP (ref 0–1.5)
LIDOCAIN IGE QN: 36 U/L — SIGNIFICANT CHANGE UP (ref 7–60)
LYMPHOCYTES # BLD AUTO: 0.93 K/UL — LOW (ref 1–3.3)
LYMPHOCYTES # BLD AUTO: 10.2 % — LOW (ref 13–44)
MAGNESIUM SERPL-MCNC: 1.1 MG/DL — LOW (ref 1.6–2.6)
MAGNESIUM SERPL-MCNC: <0.5 MG/DL — CRITICAL LOW (ref 1.6–2.6)
MCHC RBC-ENTMCNC: 28.9 PG — SIGNIFICANT CHANGE UP (ref 27–34)
MCHC RBC-ENTMCNC: 33.3 GM/DL — SIGNIFICANT CHANGE UP (ref 32–36)
MCV RBC AUTO: 86.9 FL — SIGNIFICANT CHANGE UP (ref 80–100)
MONOCYTES # BLD AUTO: 0.56 K/UL — SIGNIFICANT CHANGE UP (ref 0–0.9)
MONOCYTES NFR BLD AUTO: 6.1 % — SIGNIFICANT CHANGE UP (ref 2–14)
NEUTROPHILS # BLD AUTO: 7.6 K/UL — HIGH (ref 1.8–7.4)
NEUTROPHILS NFR BLD AUTO: 83 % — HIGH (ref 43–77)
NRBC # BLD: 0 /100 WBCS — SIGNIFICANT CHANGE UP
NRBC # FLD: 0 K/UL — SIGNIFICANT CHANGE UP
PHOSPHATE SERPL-MCNC: 4.8 MG/DL — HIGH (ref 2.5–4.5)
PHOSPHATE SERPL-MCNC: 5.8 MG/DL — HIGH (ref 2.5–4.5)
PLATELET # BLD AUTO: 258 K/UL — SIGNIFICANT CHANGE UP (ref 150–400)
POTASSIUM SERPL-MCNC: 3.1 MMOL/L — LOW (ref 3.5–5.3)
POTASSIUM SERPL-MCNC: 3.3 MMOL/L — LOW (ref 3.5–5.3)
POTASSIUM SERPL-MCNC: 3.8 MMOL/L — SIGNIFICANT CHANGE UP (ref 3.5–5.3)
POTASSIUM SERPL-SCNC: 3.1 MMOL/L — LOW (ref 3.5–5.3)
POTASSIUM SERPL-SCNC: 3.3 MMOL/L — LOW (ref 3.5–5.3)
POTASSIUM SERPL-SCNC: 3.8 MMOL/L — SIGNIFICANT CHANGE UP (ref 3.5–5.3)
PROT SERPL-MCNC: 7.6 G/DL — SIGNIFICANT CHANGE UP (ref 6–8.3)
RBC # BLD: 5.02 M/UL — SIGNIFICANT CHANGE UP (ref 4.2–5.8)
RBC # FLD: 12.9 % — SIGNIFICANT CHANGE UP (ref 10.3–14.5)
SARS-COV-2 RNA SPEC QL NAA+PROBE: SIGNIFICANT CHANGE UP
SODIUM SERPL-SCNC: 140 MMOL/L — SIGNIFICANT CHANGE UP (ref 135–145)
SODIUM SERPL-SCNC: 141 MMOL/L — SIGNIFICANT CHANGE UP (ref 135–145)
SODIUM SERPL-SCNC: 143 MMOL/L — SIGNIFICANT CHANGE UP (ref 135–145)
WBC # BLD: 9.16 K/UL — SIGNIFICANT CHANGE UP (ref 3.8–10.5)
WBC # FLD AUTO: 9.16 K/UL — SIGNIFICANT CHANGE UP (ref 3.8–10.5)

## 2021-06-28 PROCEDURE — 99285 EMERGENCY DEPT VISIT HI MDM: CPT | Mod: 25,GC

## 2021-06-28 PROCEDURE — 99223 1ST HOSP IP/OBS HIGH 75: CPT | Mod: GC

## 2021-06-28 PROCEDURE — 93010 ELECTROCARDIOGRAM REPORT: CPT | Mod: GC

## 2021-06-28 RX ORDER — FINASTERIDE 5 MG/1
5 TABLET, FILM COATED ORAL DAILY
Refills: 0 | Status: DISCONTINUED | OUTPATIENT
Start: 2021-06-28 | End: 2021-07-06

## 2021-06-28 RX ORDER — ATORVASTATIN CALCIUM 80 MG/1
40 TABLET, FILM COATED ORAL AT BEDTIME
Refills: 0 | Status: DISCONTINUED | OUTPATIENT
Start: 2021-06-28 | End: 2021-07-06

## 2021-06-28 RX ORDER — NIFEDIPINE 30 MG
90 TABLET, EXTENDED RELEASE 24 HR ORAL DAILY
Refills: 0 | Status: DISCONTINUED | OUTPATIENT
Start: 2021-06-28 | End: 2021-07-06

## 2021-06-28 RX ORDER — ONDANSETRON 8 MG/1
4 TABLET, FILM COATED ORAL ONCE
Refills: 0 | Status: COMPLETED | OUTPATIENT
Start: 2021-06-28 | End: 2021-06-28

## 2021-06-28 RX ORDER — CALCIUM CARBONATE 500(1250)
1 TABLET ORAL
Refills: 0 | Status: DISCONTINUED | OUTPATIENT
Start: 2021-06-28 | End: 2021-07-06

## 2021-06-28 RX ORDER — MAGNESIUM OXIDE 400 MG ORAL TABLET 241.3 MG
400 TABLET ORAL
Refills: 0 | Status: COMPLETED | OUTPATIENT
Start: 2021-06-28 | End: 2021-07-01

## 2021-06-28 RX ORDER — CALCIUM GLUCONATE 100 MG/ML
2 VIAL (ML) INTRAVENOUS ONCE
Refills: 0 | Status: COMPLETED | OUTPATIENT
Start: 2021-06-28 | End: 2021-06-28

## 2021-06-28 RX ORDER — SODIUM CHLORIDE 9 MG/ML
1000 INJECTION INTRAMUSCULAR; INTRAVENOUS; SUBCUTANEOUS
Refills: 0 | Status: DISCONTINUED | OUTPATIENT
Start: 2021-06-28 | End: 2021-07-06

## 2021-06-28 RX ORDER — SODIUM CHLORIDE 9 MG/ML
1000 INJECTION INTRAMUSCULAR; INTRAVENOUS; SUBCUTANEOUS ONCE
Refills: 0 | Status: COMPLETED | OUTPATIENT
Start: 2021-06-28 | End: 2021-06-28

## 2021-06-28 RX ORDER — MAGNESIUM SULFATE 500 MG/ML
2 VIAL (ML) INJECTION ONCE
Refills: 0 | Status: COMPLETED | OUTPATIENT
Start: 2021-06-28 | End: 2021-06-28

## 2021-06-28 RX ORDER — POTASSIUM CHLORIDE 20 MEQ
40 PACKET (EA) ORAL EVERY 4 HOURS
Refills: 0 | Status: DISCONTINUED | OUTPATIENT
Start: 2021-06-28 | End: 2021-06-28

## 2021-06-28 RX ORDER — DUTASTERIDE 0.5 MG/1
0 CAPSULE, LIQUID FILLED ORAL
Qty: 0 | Refills: 0 | DISCHARGE

## 2021-06-28 RX ORDER — ENOXAPARIN SODIUM 100 MG/ML
40 INJECTION SUBCUTANEOUS EVERY 24 HOURS
Refills: 0 | Status: DISCONTINUED | OUTPATIENT
Start: 2021-06-28 | End: 2021-07-06

## 2021-06-28 RX ORDER — SILODOSIN 4 MG/1
0 CAPSULE ORAL
Qty: 0 | Refills: 0 | DISCHARGE

## 2021-06-28 RX ORDER — ASPIRIN/CALCIUM CARB/MAGNESIUM 324 MG
81 TABLET ORAL DAILY
Refills: 0 | Status: DISCONTINUED | OUTPATIENT
Start: 2021-06-28 | End: 2021-07-06

## 2021-06-28 RX ORDER — LISINOPRIL 2.5 MG/1
20 TABLET ORAL DAILY
Refills: 0 | Status: DISCONTINUED | OUTPATIENT
Start: 2021-06-28 | End: 2021-07-06

## 2021-06-28 RX ORDER — MECLIZINE HCL 12.5 MG
25 TABLET ORAL EVERY 8 HOURS
Refills: 0 | Status: DISCONTINUED | OUTPATIENT
Start: 2021-06-28 | End: 2021-07-06

## 2021-06-28 RX ORDER — TAMSULOSIN HYDROCHLORIDE 0.4 MG/1
0.4 CAPSULE ORAL AT BEDTIME
Refills: 0 | Status: DISCONTINUED | OUTPATIENT
Start: 2021-06-28 | End: 2021-07-06

## 2021-06-28 RX ADMIN — ATORVASTATIN CALCIUM 40 MILLIGRAM(S): 80 TABLET, FILM COATED ORAL at 21:29

## 2021-06-28 RX ADMIN — MAGNESIUM OXIDE 400 MG ORAL TABLET 400 MILLIGRAM(S): 241.3 TABLET ORAL at 18:48

## 2021-06-28 RX ADMIN — SODIUM CHLORIDE 75 MILLILITER(S): 9 INJECTION INTRAMUSCULAR; INTRAVENOUS; SUBCUTANEOUS at 19:06

## 2021-06-28 RX ADMIN — SODIUM CHLORIDE 75 MILLILITER(S): 9 INJECTION INTRAMUSCULAR; INTRAVENOUS; SUBCUTANEOUS at 18:55

## 2021-06-28 RX ADMIN — Medication 200 GRAM(S): at 18:48

## 2021-06-28 RX ADMIN — ENOXAPARIN SODIUM 40 MILLIGRAM(S): 100 INJECTION SUBCUTANEOUS at 19:16

## 2021-06-28 RX ADMIN — TAMSULOSIN HYDROCHLORIDE 0.4 MILLIGRAM(S): 0.4 CAPSULE ORAL at 21:29

## 2021-06-28 RX ADMIN — SODIUM CHLORIDE 1000 MILLILITER(S): 9 INJECTION INTRAMUSCULAR; INTRAVENOUS; SUBCUTANEOUS at 11:44

## 2021-06-28 RX ADMIN — Medication 40 MILLIEQUIVALENT(S): at 18:48

## 2021-06-28 RX ADMIN — Medication 1 TABLET(S): at 19:03

## 2021-06-28 RX ADMIN — Medication 50 GRAM(S): at 19:36

## 2021-06-28 RX ADMIN — ONDANSETRON 4 MILLIGRAM(S): 8 TABLET, FILM COATED ORAL at 11:44

## 2021-06-28 RX ADMIN — Medication 200 GRAM(S): at 13:54

## 2021-06-28 RX ADMIN — Medication 50 GRAM(S): at 15:33

## 2021-06-28 NOTE — ED PROVIDER NOTE - ATTENDING CONTRIBUTION TO CARE
Attending note:   After face to face evaluation of this patient, I concur with above noted hx, pe, and care plan for this patient.  Small: 80 yof with few days of nausea, vomiting and diarrhea, copious amounts. Pt and wife deny any abdominal, +poor appetite, no fever or chills, no sick contacts. and for last two days, pt has had weakness and fatigue. Hx of BPH, and rest of medical history as noted above. Pt appears weak, tired, but in no distress, clear lungs, irregular cardiac rhythm, abd soft with no areas of tn, no CVAT, no pitting edema, moving all ext but appears weak. awake, alert oriented x 3.   Pt with N/V/D causing weakness unknown source, no antibiotics known. Fluids, replete elctreolytes, no indication for CT with no tn. CXR, EKG, labs, ADMIT.  Initial EKG with diff to see P waves, will repeat.

## 2021-06-28 NOTE — ED ADULT NURSE NOTE - OBJECTIVE STATEMENT
79y/o male A&ox4, ambulatory received in rm5. pt c/o dizziness, diarrhea, vomiting, weakness for few days. pt able to follow commands, denies dizziness, lightheadedness, chest pain, abdominal pain. abdomen soft, nontender. equal strength noted b/l upper lower extremities. respirations even and unlabored, airway patent. vs as noted in flowsheet. pt in NAD. 20g iv placed in RAC, labs drawn and sent. md at bedside for eval. medicated as per MD orders. bed in lowest position, side rails up, call bell in hand, safety maintained. awaiting further orders. will continue to monitor.

## 2021-06-28 NOTE — H&P ADULT - PROBLEM SELECTOR PLAN 1
Electrolyte disturbance in a setting nausea, vomiting and diarrhea.  tele monitor for arrhythmias  serial EKGs  -s/p IV Mag Sulfate and IV Calcium Gluconate.  -rechecking BMP for electrolytes now including ionized calcium Electrolyte disturbance in a setting of nausea, vomiting and diarrhea with possible medication side effect contributing - in the past was on PPI which casued hypomag and as a result hypocalcemia. Unclear if pt still taking PPI. low mag likely exacerbation by mild gastroenteritis  tele monitor for arrhythmias  serial EKGs, new qwave in avF and twi, possibly demand related? will trend ekg. pt with no chest pain. May need outpatient stress test.  -s/p IV Mag Sulfate and IV Calcium Gluconate.  -rechecking BMP for electrolytes now including ionized calcium  -monitor phosphorus

## 2021-06-28 NOTE — H&P ADULT - PROBLEM SELECTOR PLAN 3
Weakness in a setting of Nausea + Vomiting with Electrolyte Disturbance  -correct electrolytes, IV fluids hydration  -PT eval

## 2021-06-28 NOTE — H&P ADULT - NSICDXPASTMEDICALHX_GEN_ALL_CORE_FT
PAST MEDICAL HISTORY:  CVA (Cerebral Infarction) Ataxia as residual    Essential Hypertension     Hyperlipidemia     PU (Peptic Ulcer)     Secondary Seizure Disorder     Vertigo

## 2021-06-28 NOTE — H&P ADULT - ATTENDING COMMENTS
Pt seen and examined. I discussed the case with CORAL Angulo and agree. 81 y/o male with pmhx of HTN, kidney stones, prior CVA, BPH and possible dementia who presents to the ER with 2 days of nausea, vomiting and diarrhea after eating dinner last night found to be hypocalcemic, hypomagnesemic and hyperphosphatemic. Pt's vitals stable, exam overall unremarkable, RRR, clear lungs, soft abdomen and no peripheral edema. Pt reports feeling improved and hungry after fluids in the ER. No further vomiting or diarrhea.  #nausea/vomiting/diarrhea - resolved, likely 2/2 mild gastroenteritis from dinner. lipase wnl. abd soft  #hypocalcemia- pt with history of this, likely related to hypomagnesmia and vit D deficiency. continue calcium supplementation and and check PTH, vitD levels. trend bmp  #hypomag - in the past attributed to PPI, unclear if pt still taking this. Also likely exacerbated in the setting of diarrhea over the last two days. improving with supplementation, continue to replete and trend  #Hyperphos- unclear, rising steadily, possibly from volume contraction initially? Cr appears similar to prior. Recheck phos at 11PM. will obtain renal eval in AM. May need to start phos binder if still rising. continue iv fluids

## 2021-06-28 NOTE — H&P ADULT - GIT GEN HX ROS MEA POS PC
Take tylenol or motrin as needed for pain. There are no listed interactions with your current medications.  For worsening symptoms, chest pain, shortness of breath, increased abdominal pain, high grade fever, stroke or stroke like symptoms, immediately go to the nearest Emergency Room or call 911 as soon as possible.      nausea/vomiting/diarrhea

## 2021-06-28 NOTE — PHARMACOTHERAPY INTERVENTION NOTE - COMMENTS
Medication history is complete and was verified with the patient's wife, [facility] and [facility]. Medication list updated in Outpatient Medication Record (OMR). Please call spectra [phone] if you have any questions.

## 2021-06-28 NOTE — H&P ADULT - PROBLEM SELECTOR PLAN 5
Monitor BP daily  DASH diet  Continue Lisinopril and Nifedipine  -waiting for med rec pharmacist to verify meds.

## 2021-06-28 NOTE — H&P ADULT - ASSESSMENT
81 yo male PMHx HTN, HLD, CVA (ataxia as residual,) and Vertigo p/w generalized weakness, nausea, vomiting and diarrhea x2 days, found to be hypocalcemic, hypokalemic and hypomagnesemic, admitted to tele for generalized weakness in a setting of electrolyte disturbance with nausea, vomiting and diarrhea.  81 yo male PMHx HTN, HLD, CVA (ataxia as residual,) and Vertigo p/w generalized weakness, nausea, vomiting and diarrhea x2 days, found to be hypocalcemic, hypokalemic and hypomagnesemic, admitted to tele for generalized weakness in a setting of electrolyte disturbance with nausea, vomiting and diarrhea.

## 2021-06-28 NOTE — H&P ADULT - PROBLEM SELECTOR PLAN 2
Symptoms have subsided since pt came to [facility] ED  -continue gentle IV fluids hydration with NS @ 75cc/hr Symptoms have subsided since pt came to [facility] ED, likely had mild gastroenteritis, now hungry and says diarrhea stopped.  -continue gentle IV fluids hydration with NS @ 75cc/hr

## 2021-06-28 NOTE — ED PROVIDER NOTE - CARE PLAN
Principal Discharge DX:	Hypocalcemia  Secondary Diagnosis:	Hypomagnesemia  Secondary Diagnosis:	Nausea, vomiting and diarrhea

## 2021-06-28 NOTE — H&P ADULT - HISTORY OF PRESENT ILLNESS
81 yo male PMHx HTN, HLD, CVA (ataxia as residual,) and Vertigo p/w generalized weakness, nausea, vomiting and diarrhea x2 days.  Last evening he was at his daughter's house where he ate chicken for dinner. Then last night developed nausea, vomiting and diarrhea x 2 episodes and this morning 2 episodes as well. Emesis was bilious and diarrhea described as water, non-foul smelling. Furthermore, pt developed generalized weakness, dizziness and lightheadedness. EMS was called and pt taken to [facility] ED. Pt denies fever, chills, chest pain, sob, palpitations, diaphoresis, abdominal pain, headache, syncope or sick contacts.    In ED pt found to be hypocalcemic and hypomagnesemic.  He was given IV NS x1L, Mg Sulfate IV 2g and IV Ca Gluconate 2g where his symptoms improved. In general pt is poor historian, doesn't know his home meds. Med Rec pharmacist asked to assist with home med rec. Multiple attempts made to reach his wife, Adwoa at [phone] without success.

## 2021-06-28 NOTE — ED PROVIDER NOTE - OBJECTIVE STATEMENT
80M p/w vomiting, diarrhea, generalized weakness x 3-4 days. No fevers, abd pain, urinary sx. No CP, SOB, dysuria, hematuria. Has poor urinary stream at baseline.  No recent travel, no recent abx.

## 2021-06-28 NOTE — H&P ADULT - NSHPLABSRESULTS_GEN_ALL_CORE
EKG: NSR @ 66bpm,TWI V4-V6, Q-waves III, AVF. QTC 469ms                        14.5   9.16  )-----------( 258      ( 28 Jun 2021 12:22 )             43.6     06-28    143  |  99  |  16  ----------------------------<  99  3.1<L>   |  26  |  0.97    Ca    6.2<LL>      28 Jun 2021 13:33  Phos  5.0     06-28  Mg     0.5     06-28    TPro  7.6  /  Alb  4.0  /  TBili  1.3<H>  /  DBili  x   /  AST  23  /  ALT  15  /  AlkPhos  83  06-28          LIVER FUNCTIONS - ( 28 Jun 2021 12:22 )  Alb: 4.0 g/dL / Pro: 7.6 g/dL / ALK PHOS: 83 U/L / ALT: 15 U/L / AST: 23 U/L / GGT: x EKG: NSR @ 66bpm,TW flattening V4-V6, Q-waves III, AVF. QTC 469ms                        14.5   9.16  )-----------( 258      ( 28 Jun 2021 12:22 )             43.6     06-28    143  |  99  |  16  ----------------------------<  99  3.1<L>   |  26  |  0.97    Ca    6.2<LL>      28 Jun 2021 13:33  Phos  5.0     06-28  Mg     0.5     06-28    TPro  7.6  /  Alb  4.0  /  TBili  1.3<H>  /  DBili  x   /  AST  23  /  ALT  15  /  AlkPhos  83  06-28          LIVER FUNCTIONS - ( 28 Jun 2021 12:22 )  Alb: 4.0 g/dL / Pro: 7.6 g/dL / ALK PHOS: 83 U/L / ALT: 15 U/L / AST: 23 U/L / GGT: x

## 2021-06-28 NOTE — ED ADULT NURSE NOTE - NSIMPLEMENTINTERV_GEN_ALL_ED
Implemented All Universal Safety Interventions:  [place] to call system. Call bell, personal items and telephone within reach. Instruct patient to call for assistance. Room bathroom lighting operational. Non-slip footwear when patient is off stretcher. Physically safe environment: no spills, clutter or unnecessary equipment. Stretcher in lowest position, wheels locked, appropriate side rails in place.

## 2021-06-28 NOTE — ED PROVIDER NOTE - CLINICAL SUMMARY MEDICAL DECISION MAKING FREE TEXT BOX
Samuel, PGY2 - 80M p/w NVD, generalized weakness x days. No fevers, abd pain. Abd nontender, non-toxic appearing. DDx includes gastroenteritis, lyte abnormality. Plan: labs, ivf, reeval

## 2021-06-28 NOTE — ED PROVIDER NOTE - NS ED ROS FT
General: +Generalized weakness. Denies fevers  Eyes: Denies vision changes  ENMT: Denies sore throat  CV: Denies chest pain  Resp: Denies SOB  GI: +nausea, vomiting, diarrhea. Denies abdominal pain  : Denies painful urination  Skin: Denies new rashes  Neuro: Denies headache, focal weakness  MSK: Denies recent trauma

## 2021-06-29 LAB
24R-OH-CALCIDIOL SERPL-MCNC: 18.8 NG/ML — LOW (ref 30–80)
ALBUMIN SERPL ELPH-MCNC: 3.6 G/DL — SIGNIFICANT CHANGE UP (ref 3.3–5)
ALP SERPL-CCNC: 78 U/L — SIGNIFICANT CHANGE UP (ref 40–120)
ALT FLD-CCNC: 13 U/L — SIGNIFICANT CHANGE UP (ref 4–41)
ANION GAP SERPL CALC-SCNC: 15 MMOL/L — HIGH (ref 7–14)
ANION GAP SERPL CALC-SCNC: 16 MMOL/L — HIGH (ref 7–14)
AST SERPL-CCNC: 14 U/L — SIGNIFICANT CHANGE UP (ref 4–40)
BILIRUB SERPL-MCNC: 1.4 MG/DL — HIGH (ref 0.2–1.2)
BUN SERPL-MCNC: 16 MG/DL — SIGNIFICANT CHANGE UP (ref 7–23)
BUN SERPL-MCNC: 17 MG/DL — SIGNIFICANT CHANGE UP (ref 7–23)
CALCIUM SERPL-MCNC: 7.4 MG/DL — LOW (ref 8.4–10.5)
CALCIUM SERPL-MCNC: 7.8 MG/DL — LOW (ref 8.4–10.5)
CHLORIDE SERPL-SCNC: 101 MMOL/L — SIGNIFICANT CHANGE UP (ref 98–107)
CHLORIDE SERPL-SCNC: 102 MMOL/L — SIGNIFICANT CHANGE UP (ref 98–107)
CO2 SERPL-SCNC: 24 MMOL/L — SIGNIFICANT CHANGE UP (ref 22–31)
CO2 SERPL-SCNC: 24 MMOL/L — SIGNIFICANT CHANGE UP (ref 22–31)
COVID-19 SPIKE DOMAIN AB INTERP: POSITIVE
COVID-19 SPIKE DOMAIN ANTIBODY RESULT: >250 U/ML — HIGH
CREAT SERPL-MCNC: 1.03 MG/DL — SIGNIFICANT CHANGE UP (ref 0.5–1.3)
CREAT SERPL-MCNC: 1.04 MG/DL — SIGNIFICANT CHANGE UP (ref 0.5–1.3)
GLUCOSE SERPL-MCNC: 88 MG/DL — SIGNIFICANT CHANGE UP (ref 70–99)
GLUCOSE SERPL-MCNC: 97 MG/DL — SIGNIFICANT CHANGE UP (ref 70–99)
HCT VFR BLD CALC: 41.5 % — SIGNIFICANT CHANGE UP (ref 39–50)
HGB BLD-MCNC: 13.6 G/DL — SIGNIFICANT CHANGE UP (ref 13–17)
MAGNESIUM SERPL-MCNC: 1.4 MG/DL — LOW (ref 1.6–2.6)
MAGNESIUM SERPL-MCNC: 2 MG/DL — SIGNIFICANT CHANGE UP (ref 1.6–2.6)
MCHC RBC-ENTMCNC: 29.1 PG — SIGNIFICANT CHANGE UP (ref 27–34)
MCHC RBC-ENTMCNC: 32.8 GM/DL — SIGNIFICANT CHANGE UP (ref 32–36)
MCV RBC AUTO: 88.7 FL — SIGNIFICANT CHANGE UP (ref 80–100)
NRBC # BLD: 0 /100 WBCS — SIGNIFICANT CHANGE UP
NRBC # FLD: 0 K/UL — SIGNIFICANT CHANGE UP
PHOSPHATE SERPL-MCNC: 4.7 MG/DL — HIGH (ref 2.5–4.5)
PHOSPHATE SERPL-MCNC: 4.9 MG/DL — HIGH (ref 2.5–4.5)
PLATELET # BLD AUTO: 223 K/UL — SIGNIFICANT CHANGE UP (ref 150–400)
POTASSIUM SERPL-MCNC: 3.4 MMOL/L — LOW (ref 3.5–5.3)
POTASSIUM SERPL-MCNC: 4 MMOL/L — SIGNIFICANT CHANGE UP (ref 3.5–5.3)
POTASSIUM SERPL-SCNC: 3.4 MMOL/L — LOW (ref 3.5–5.3)
POTASSIUM SERPL-SCNC: 4 MMOL/L — SIGNIFICANT CHANGE UP (ref 3.5–5.3)
PROT SERPL-MCNC: 6.7 G/DL — SIGNIFICANT CHANGE UP (ref 6–8.3)
PTH-INTACT FLD-MCNC: 72 PG/ML — HIGH (ref 15–65)
RBC # BLD: 4.68 M/UL — SIGNIFICANT CHANGE UP (ref 4.2–5.8)
RBC # FLD: 13.2 % — SIGNIFICANT CHANGE UP (ref 10.3–14.5)
SARS-COV-2 IGG+IGM SERPL QL IA: >250 U/ML — HIGH
SARS-COV-2 IGG+IGM SERPL QL IA: POSITIVE
SODIUM SERPL-SCNC: 141 MMOL/L — SIGNIFICANT CHANGE UP (ref 135–145)
SODIUM SERPL-SCNC: 141 MMOL/L — SIGNIFICANT CHANGE UP (ref 135–145)
WBC # BLD: 11.97 K/UL — HIGH (ref 3.8–10.5)
WBC # FLD AUTO: 11.97 K/UL — HIGH (ref 3.8–10.5)

## 2021-06-29 RX ORDER — CALCIUM GLUCONATE 100 MG/ML
2 VIAL (ML) INTRAVENOUS ONCE
Refills: 0 | Status: COMPLETED | OUTPATIENT
Start: 2021-06-29 | End: 2021-06-29

## 2021-06-29 RX ORDER — POTASSIUM CHLORIDE 20 MEQ
40 PACKET (EA) ORAL EVERY 4 HOURS
Refills: 0 | Status: COMPLETED | OUTPATIENT
Start: 2021-06-29 | End: 2021-06-29

## 2021-06-29 RX ORDER — CHOLECALCIFEROL (VITAMIN D3) 125 MCG
1000 CAPSULE ORAL DAILY
Refills: 0 | Status: DISCONTINUED | OUTPATIENT
Start: 2021-06-29 | End: 2021-07-06

## 2021-06-29 RX ORDER — MAGNESIUM SULFATE 500 MG/ML
2 VIAL (ML) INJECTION ONCE
Refills: 0 | Status: COMPLETED | OUTPATIENT
Start: 2021-06-29 | End: 2021-06-29

## 2021-06-29 RX ADMIN — Medication 1000 UNIT(S): at 17:30

## 2021-06-29 RX ADMIN — TAMSULOSIN HYDROCHLORIDE 0.4 MILLIGRAM(S): 0.4 CAPSULE ORAL at 21:50

## 2021-06-29 RX ADMIN — Medication 1 TABLET(S): at 17:30

## 2021-06-29 RX ADMIN — FINASTERIDE 5 MILLIGRAM(S): 5 TABLET, FILM COATED ORAL at 11:54

## 2021-06-29 RX ADMIN — Medication 81 MILLIGRAM(S): at 08:29

## 2021-06-29 RX ADMIN — SODIUM CHLORIDE 75 MILLILITER(S): 9 INJECTION INTRAMUSCULAR; INTRAVENOUS; SUBCUTANEOUS at 12:03

## 2021-06-29 RX ADMIN — MAGNESIUM OXIDE 400 MG ORAL TABLET 400 MILLIGRAM(S): 241.3 TABLET ORAL at 11:54

## 2021-06-29 RX ADMIN — Medication 90 MILLIGRAM(S): at 06:15

## 2021-06-29 RX ADMIN — Medication 40 MILLIEQUIVALENT(S): at 03:54

## 2021-06-29 RX ADMIN — MAGNESIUM OXIDE 400 MG ORAL TABLET 400 MILLIGRAM(S): 241.3 TABLET ORAL at 08:31

## 2021-06-29 RX ADMIN — Medication 40 MILLIEQUIVALENT(S): at 07:45

## 2021-06-29 RX ADMIN — ATORVASTATIN CALCIUM 40 MILLIGRAM(S): 80 TABLET, FILM COATED ORAL at 21:50

## 2021-06-29 RX ADMIN — Medication 200 GRAM(S): at 03:16

## 2021-06-29 RX ADMIN — ENOXAPARIN SODIUM 40 MILLIGRAM(S): 100 INJECTION SUBCUTANEOUS at 21:50

## 2021-06-29 RX ADMIN — LISINOPRIL 20 MILLIGRAM(S): 2.5 TABLET ORAL at 06:15

## 2021-06-29 RX ADMIN — Medication 40 MILLIEQUIVALENT(S): at 11:55

## 2021-06-29 RX ADMIN — MAGNESIUM OXIDE 400 MG ORAL TABLET 400 MILLIGRAM(S): 241.3 TABLET ORAL at 17:30

## 2021-06-29 RX ADMIN — Medication 50 GRAM(S): at 03:53

## 2021-06-29 RX ADMIN — Medication 1 TABLET(S): at 08:29

## 2021-06-29 NOTE — PROGRESS NOTE ADULT - ASSESSMENT
78 year old man with a history of HTN, HLD, kidney stones, GERD, BPH, and possible dementia who presents with subacute dizziness vs vertigo.  He reports he was in his usual state of health until about 6-7 weeks ago when developed intermittent short lived dizziness.  He reports his symptoms would come on occasionally with walking 1-2 blocks and possibly had a room spinning sensation.  Would occur maybe a few times a day and would last a few minutes before resolving spontaneously.  No associated nausea, vomitting, muscle aches, chest pain, palpitations, fevers, chills, blurry vision, ear ringing, ear fullness.  He does note a few episodes of diarrhea and a headache in the last few days.  He has not had any falls or trauma associated.  Due to these symptoms he presented to [facility] ED.  In the ED, he was afebrile with unremarkable vital signs.  Labwork remarkable for AG 15, Ca 8.0, AST 41, downtrending trops from 39 to 30, elevated lactate 2.6, elevated , and negative CT angio head/neck and CT a/p, unremarkable CXR He was given NS 1 L, meclizine 50, reglan 10 IV and admitted for further evaluation.         Problem/Plan - 1:  ·  Problem: Vertigo.  Plan: -Resolved. CT ,CTA noted .   -TTE noted.   -MRI and MRA noted.      Problem/Plan - 2:  ·  Problem: Essential Hypertension. -controlled.c/w Lisinopril   Problem/Plan - 3:  ·  Problem: Mild cognitive impairment.  Plan:  Doing well. .      Problem/Plan - 4:  ·  Problem: Benign prostatic hyperplasia without lower urinary tract symptoms.  Plan: On dutasteride and Silodosin as outpatient.  Will hold silodosin as frequently causes some orthostatic hypotension  Continue dutasteride.      Problem/Plan - 5:  ·  Problem: Hyperlipidemia, unspecified hyperlipidemia type.  Plan: Statin      Problem/Plan - 6:  Problem: Hypocalcemia.Elevated intact PTH-hYPERPARATHYROIDISM?.  h/o Nephrolithiasis-Endo . F/U     Problem/Plan - 7:  ·  Problem: Need for prophylactic measure.  Plan: Improve score 1 for age.  SCD for DVT prophylaxis

## 2021-06-29 NOTE — PATIENT PROFILE ADULT - NSPROSPHOSPCHAPLAINYN_GEN_A_NUR
Problem: Mobility Impaired (Adult and Pediatric)  Goal: *Acute Goals and Plan of Care (Insert Text)  Physical Therapy Goals  Initiated 3/6/2019  1. Patient will transfer from bed to chair and chair to bed with modified independence using the least restrictive device within 7 day(s). 2.  Patient will perform sit to stand with modified independence within 7 day(s). 3.  Patient will ambulate with modified independence for 500 feet with the least restrictive device within 7 day(s). 4.  Patient will ascend/descend 21 stairs with 1 handrail(s) with modified independence within 7 day(s). physical Therapy EVALUATION  Patient: Yessenia Lopez (50 y.o. male)  Date: 3/6/2019  Primary Diagnosis: Acute on chronic systolic heart failure, NYHA class 4 ([facility]) [I50.23]  A-fib ([facility]) [I48.91]  Procedure(s) (LRB):  INSERT ICD BIV MULTI (N/A)  Ablation A-Flutter (N/A)  Ep 3d Mapping (N/A)  Lt Atrial Pace & Record During Ep Study (N/A)  Eval Icd Generator & Leads W Testing At Implant (N/A) 2 Days Post-Op   Precautions:   Fall(sling L arm due to ICD placement 3/4)    ASSESSMENT :  Based on the objective data described below, the patient presents with decreased endurance and impaired balance after being admitted for ICD and ablation done 3/4. At this time, he is able to ambulate with CGA for balance moderate distances, limited by LEN[place]. His VS were stable, and he reports he feels he is walking better now than he did before admission. His balance responses are poor, and he is a very high fall risk as a result. If his family is able to provide 24 hour support, then recommend home with HHPT. He will need to be able to manage a flight of stairs to his bedroom, and certainly needs assist with stairs. We will continue to follow to progress his activity as he is able to tolerate. Patient will benefit from skilled intervention to address the above impairments.   Patients rehabilitation potential is considered to be Fair  Factors which may influence rehabilitation potential include:   []         None noted  []         Mental ability/status  [x]         Medical condition: EF 10%  []         Home/family situation and support systems  [x]         Safety awareness  []         Pain tolerance/management  []         Other:      PLAN :  Recommendations and Planned Interventions:  [x]           Bed Mobility Training             []    Neuromuscular Re-Education  [x]           Transfer Training                   []    Orthotic/Prosthetic Training  [x]           Gait Training                         []    Modalities  [x]           Therapeutic Exercises           []    Edema Management/Control  [x]           Therapeutic Activities            [x]    Patient and Family Training/Education  []           Other (comment):    Frequency/Duration: Patient will be followed by physical therapy  5 times a week to address goals. Discharge Recommendations: Home Health  Further Equipment Recommendations for Discharge: none     SUBJECTIVE:   Patient stated I am doing better now than when I got here.     OBJECTIVE DATA SUMMARY:   HISTORY:    Past Medical History:   Diagnosis Date    Anger     TAKES PAROXETINE TO CONTROL ANGER ISSUES    CAD (coronary artery disease)     Foot drop, left     WEARS METAL BRACE    Gout     Hard of hearing     High cholesterol     Hypertension     Prostate enlargement     Thyroid disease     Unspecified sleep apnea     DOESN'T USE CPAP; \"IT MADE HIM SICK-COLDS, SINUS\", PER WIFE     Past Surgical History:   Procedure Laterality Date    CARDIAC SURG PROCEDURE UNLIST      ANGIOPLASTY WITH CORONARY STENT    CARDIAC SURG PROCEDURE UNLIST  1996    AORTIC VALVE REPLACEMENT    HX ORTHOPAEDIC  2001    ORIF COMPOUND FRACTURE LEFT ANKLE    HX RETINAL DETACHMENT REPAIR  1980s    LEFT EYE    NC COMPRE ELECTROPHYSIOL XM W/LEFT ATRIAL PACNG/REC N/A 3/4/2019    Lt Atrial Pace & Record During Ep Study performed by Gabby Zamora MD at [facility] CARDIAC CATH LAB    [place] EPHYS EVAL PACG CVDFB PRGRMG/REPRGRMG PARAMETERS N/A 3/4/2019    Eval Icd Generator & Leads W Testing At Implant performed by Kaden Underwood MD at [address], [facility]  CATH LAB    [place] EPHYS EVAL W/ABLATION [address] N/A 3/4/2019    Ablation A-Flutter performed by Kaden Underwood MD at [address], [facility] Dr CATH LAB    [place] INSJ/RPLCMT PERM DFB W/TRNSVNS LDS 1/DUAL [facility] N/A 3/4/2019    INSERT ICD BIV MULTI performed by Kaden Underwood MD at [address], [facility]  CATH LAB    [place] INTRACARDIAC ELECTROPHYSIOLOGIC 3D MAPPING N/A 3/4/2019    Ep 3d Mapping performed by Kaden Underwood MD at [address], [facility]  CATH LAB     Prior Level of Function/Home Situation: independent, uses a cane at home, lives with family  Personal factors and/or comorbidities impacting plan of care: CHF, decreased balance, Hopi, ICD placement    Home Situation  Home Environment: Private residence  # Steps to Enter: 5  One/Two Story Residence: Two story  # of Interior Steps: 21  Interior Rails: Both  Living Alone: No  Support Systems: Spouse/Significant Other/Partner  Patient Expects to be Discharged to[de-identified] Private residence  Current DME Used/Available at Home: None    EXAMINATION/PRESENTATION/DECISION MAKING:   Critical Behavior:              Hearing: Auditory  Auditory Impairment: Hard of hearing, bilateral  Hearing Aids/Status: Bilateral, With patient  Skin:  Dressing in place over ICD  Edema: none  Range Of Motion:  AROM: Generally decreased, functional(L arm not tested)                       Strength:    Strength: Generally decreased, functional(L arm not tested)                    Tone & Sensation:   Tone: Normal                              Coordination:  Coordination: Within functional limits  Vision:      Functional Mobility:  Bed Mobility:     Supine to Sit: Modified independent  Sit to Supine: Modified independent     Transfers:  Sit to Stand: Supervision; Additional time  Stand to Sit: Supervision; Additional time                       Balance: Sitting: Intact; Without support  Standing: Impaired; Without support  Standing - Static: Good  Standing - Dynamic : (poor protective responses to perturbations)  Ambulation/Gait Training:  Distance (ft): 200 Feet (ft)  Assistive Device: Gait belt  Ambulation - Level of Assistance: Contact guard assistance; Additional time        Gait Abnormalities: Decreased step clearance; Path deviations;Trunk sway increased        Base of Support: Widened(and externally rotated)     Speed/Niki: Pace decreased (<100 feet/min)  Step Length: Left shortened;Right shortened        Interventions: Safety awareness training; Tactile cues; Verbal cues; Visual/Demos            Stairs: Therapeutic Exercises:       Functional Measure:  Tinetti test:    Sitting Balance: 1  Arises: 1  Attempts to Rise: 1  Immediate Standing Balance: 0  Standing Balance: 1  Nudged: 0  Eyes Closed: 1  Turn 360 Degrees - Continuous/Discontinuous: 1  Turn 360 Degrees - Steady/Unsteady: 1  Sitting Down: 1  Balance Score: 8  Indication of Gait: 1  R Step Length/Height: 1  L Step Length/Height: 1  R Foot Clearance: 1  L Foot Clearance: 1  Step Symmetry: 1  Step Continuity: 1  Path: 1  Trunk: 0  Walking Time: 0  Gait Score: 8  Total Score: 16         Tinetti Tool Score Risk of Falls  <19 = High Fall Risk  19-24 = Moderate Fall Risk  25-28 = Low Fall Risk  Tinetti ME. Performance-Oriented Assessment of Mobility Problems in Elderly Patients. Gupta 66; [id number].  (Scoring Description: PT Bulletin Feb. 10, 1993)    Older adults: Mary Carmen Parikh et al, 2009; n = 1000 [facility] elderly evaluated with ABC, PAOLO, ADL, and IADL)  · Mean PAOLO score for males aged 69-68 years = 26.21(3.40)  · Mean PAOLO score for females age 69-68 years = 25.16(4.30)  · Mean PAOLO score for males over 80 years = 23.29(6.02)  · Mean PAOLO score for females over 80 years = 17.20(8.32)          Physical Therapy Evaluation Charge Determination   History Examination Presentation Decision-Making   HIGH Complexity :3+ comorbidities / personal factors will impact the outcome/ POC  MEDIUM Complexity : 3 Standardized tests and measures addressing body structure, function, activity limitation and / or participation in recreation  LOW Complexity : Stable, uncomplicated  LOW Complexity : FOTO score of       Based on the above components, the patient evaluation is determined to be of the following complexity level: LOW     Pain:  Pain Scale 1: Numeric (0 - 10)  Pain Intensity 1: 0  Pain Location 1: Shoulder  Pain Orientation 1: Left  Pain Description 1: Aching; Sore  Pain Intervention(s) 1: Medication (see MAR); Rest;Repositioned  Activity Tolerance:   Fair, unable to complete 6MW  Please refer to the flowsheet for vital signs taken during this treatment. After treatment:   [x]         Patient left in no apparent distress sitting up in chair  []         Patient left in no apparent distress in bed  [x]         Call bell left within reach  [x]         Nursing notified  []         Caregiver present  []         Bed alarm activated    COMMUNICATION/EDUCATION:   The patients plan of care was discussed with: Registered Nurse and . [x]         Fall prevention education was provided and the patient/caregiver indicated understanding. [x]         Patient/family have participated as able in goal setting and plan of care. [x]         Patient/family agree to work toward stated goals and plan of care. []         Patient understands intent and goals of therapy, but is neutral about his/her participation. []         Patient is unable to participate in goal setting and plan of care.     Thank you for this referral.  Henry Herman, PT   Time Calculation: 27 mins no

## 2021-06-29 NOTE — PHYSICAL THERAPY INITIAL EVALUATION ADULT - GAIT DISTANCE, PT EVAL
limited by SOB, fatigue. +dizziness when first rising out of bed, which subsides with rest. +cardiac monitor/10 feet

## 2021-06-29 NOTE — PHYSICAL THERAPY INITIAL EVALUATION ADULT - IMPAIRMENTS CONTRIBUTING TO GAIT DEVIATIONS, PT EVAL
wide base of support, increased medial-lateral weight shifting; stiffness in bilateral knees (1 stumble, which pt recovers with BUE support on bed surface);/impaired balance/impaired postural control/decreased strength

## 2021-06-29 NOTE — PHYSICAL THERAPY INITIAL EVALUATION ADULT - ADDITIONAL COMMENTS
Pt lives in house with wife. few steps to enter. resides on first floor. ambulates with cane. performs ADLs independently. remains active (goes to [facility] for activities regularly).     Pt left in bed with head of bed elevated, lines and tubes intact, NAD. RN aware of session. +call bell. SpO2 96% s/p ambulation on room air; however, pt appears SOB, +tachypneic. Improves with resting in bed.

## 2021-06-29 NOTE — PHYSICAL THERAPY INITIAL EVALUATION ADULT - GENERAL OBSERVATIONS, REHAB EVAL
Pt received semi-fitch in bed, cardiac monitor, NAD. Pt agreeable to PT consultation. Cleared for PT as per ZURDO Reynoso, cleared by NP at bedside

## 2021-06-29 NOTE — PHYSICAL THERAPY INITIAL EVALUATION ADULT - DISCHARGE DISPOSITION, PT EVAL
Inpatient rehab facility at this time. Pt may progress to home PT prior to discharge with use of rolling walker . Pt with stiffness in bilateral knees and SOB with ambulation (SpO2 96% s/p ambulation)

## 2021-06-29 NOTE — PHYSICAL THERAPY INITIAL EVALUATION ADULT - PERTINENT HX OF CURRENT PROBLEM, REHAB EVAL
80 y.o. male PMHx HTN, HLD, CVA (ataxia as residual,) and Vertigo presenting with generalized weakness, nausea, vomiting and diarrhea x2 days, found to be hypocalcemic, hypokalemic and hypomagnesemic, admitted to tele for generalized weakness in a setting of electrolyte disturbance with nausea, vomiting and diarrhea.

## 2021-06-30 LAB
ANION GAP SERPL CALC-SCNC: 12 MMOL/L — SIGNIFICANT CHANGE UP (ref 7–14)
APPEARANCE UR: CLEAR — SIGNIFICANT CHANGE UP
BACTERIA # UR AUTO: NEGATIVE — SIGNIFICANT CHANGE UP
BILIRUB UR-MCNC: NEGATIVE — SIGNIFICANT CHANGE UP
BUN SERPL-MCNC: 19 MG/DL — SIGNIFICANT CHANGE UP (ref 7–23)
CALCIUM SERPL-MCNC: 7.4 MG/DL — LOW (ref 8.4–10.5)
CHLORIDE SERPL-SCNC: 106 MMOL/L — SIGNIFICANT CHANGE UP (ref 98–107)
CO2 SERPL-SCNC: 22 MMOL/L — SIGNIFICANT CHANGE UP (ref 22–31)
COLOR SPEC: YELLOW — SIGNIFICANT CHANGE UP
CREAT SERPL-MCNC: 0.83 MG/DL — SIGNIFICANT CHANGE UP (ref 0.5–1.3)
DIFF PNL FLD: ABNORMAL
EPI CELLS # UR: 1 /HPF — SIGNIFICANT CHANGE UP (ref 0–5)
GLUCOSE SERPL-MCNC: 98 MG/DL — SIGNIFICANT CHANGE UP (ref 70–99)
GLUCOSE UR QL: NEGATIVE — SIGNIFICANT CHANGE UP
HCT VFR BLD CALC: 37.9 % — LOW (ref 39–50)
HGB BLD-MCNC: 12.1 G/DL — LOW (ref 13–17)
HYALINE CASTS # UR AUTO: 2 /LPF — SIGNIFICANT CHANGE UP (ref 0–7)
KETONES UR-MCNC: NEGATIVE — SIGNIFICANT CHANGE UP
LEUKOCYTE ESTERASE UR-ACNC: NEGATIVE — SIGNIFICANT CHANGE UP
MAGNESIUM SERPL-MCNC: 1.6 MG/DL — SIGNIFICANT CHANGE UP (ref 1.6–2.6)
MCHC RBC-ENTMCNC: 28.7 PG — SIGNIFICANT CHANGE UP (ref 27–34)
MCHC RBC-ENTMCNC: 31.9 GM/DL — LOW (ref 32–36)
MCV RBC AUTO: 90 FL — SIGNIFICANT CHANGE UP (ref 80–100)
NITRITE UR-MCNC: NEGATIVE — SIGNIFICANT CHANGE UP
NRBC # BLD: 0 /100 WBCS — SIGNIFICANT CHANGE UP
NRBC # FLD: 0 K/UL — SIGNIFICANT CHANGE UP
PH UR: 6 — SIGNIFICANT CHANGE UP (ref 5–8)
PHOSPHATE SERPL-MCNC: 2.2 MG/DL — LOW (ref 2.5–4.5)
PLATELET # BLD AUTO: 194 K/UL — SIGNIFICANT CHANGE UP (ref 150–400)
POTASSIUM SERPL-MCNC: 4 MMOL/L — SIGNIFICANT CHANGE UP (ref 3.5–5.3)
POTASSIUM SERPL-SCNC: 4 MMOL/L — SIGNIFICANT CHANGE UP (ref 3.5–5.3)
PROT UR-MCNC: ABNORMAL
RBC # BLD: 4.21 M/UL — SIGNIFICANT CHANGE UP (ref 4.2–5.8)
RBC # FLD: 13.2 % — SIGNIFICANT CHANGE UP (ref 10.3–14.5)
RBC CASTS # UR COMP ASSIST: 75 /HPF — HIGH (ref 0–4)
SODIUM SERPL-SCNC: 140 MMOL/L — SIGNIFICANT CHANGE UP (ref 135–145)
SP GR SPEC: 1.02 — SIGNIFICANT CHANGE UP (ref 1.01–1.02)
UROBILINOGEN FLD QL: SIGNIFICANT CHANGE UP
WBC # BLD: 10.05 K/UL — SIGNIFICANT CHANGE UP (ref 3.8–10.5)
WBC # FLD AUTO: 10.05 K/UL — SIGNIFICANT CHANGE UP (ref 3.8–10.5)
WBC UR QL: 1 /HPF — SIGNIFICANT CHANGE UP (ref 0–5)

## 2021-06-30 RX ADMIN — MAGNESIUM OXIDE 400 MG ORAL TABLET 400 MILLIGRAM(S): 241.3 TABLET ORAL at 17:12

## 2021-06-30 RX ADMIN — Medication 81 MILLIGRAM(S): at 12:26

## 2021-06-30 RX ADMIN — FINASTERIDE 5 MILLIGRAM(S): 5 TABLET, FILM COATED ORAL at 12:27

## 2021-06-30 RX ADMIN — LISINOPRIL 20 MILLIGRAM(S): 2.5 TABLET ORAL at 05:35

## 2021-06-30 RX ADMIN — Medication 1 TABLET(S): at 17:12

## 2021-06-30 RX ADMIN — MAGNESIUM OXIDE 400 MG ORAL TABLET 400 MILLIGRAM(S): 241.3 TABLET ORAL at 09:16

## 2021-06-30 RX ADMIN — Medication 90 MILLIGRAM(S): at 09:16

## 2021-06-30 RX ADMIN — TAMSULOSIN HYDROCHLORIDE 0.4 MILLIGRAM(S): 0.4 CAPSULE ORAL at 21:10

## 2021-06-30 RX ADMIN — MAGNESIUM OXIDE 400 MG ORAL TABLET 400 MILLIGRAM(S): 241.3 TABLET ORAL at 12:26

## 2021-06-30 RX ADMIN — Medication 1000 UNIT(S): at 12:27

## 2021-06-30 RX ADMIN — Medication 1 TABLET(S): at 05:35

## 2021-06-30 RX ADMIN — ATORVASTATIN CALCIUM 40 MILLIGRAM(S): 80 TABLET, FILM COATED ORAL at 21:10

## 2021-06-30 RX ADMIN — ENOXAPARIN SODIUM 40 MILLIGRAM(S): 100 INJECTION SUBCUTANEOUS at 17:13

## 2021-06-30 NOTE — PROGRESS NOTE ADULT - ASSESSMENT
78 year old man with a history of HTN, HLD, kidney stones, GERD, BPH, and possible dementia who presents with subacute dizziness vs vertigo.  He reports he was in his usual state of health until about 6-7 weeks ago when developed intermittent short lived dizziness.  He reports his symptoms would come on occasionally with walking 1-2 blocks and possibly had a room spinning sensation.  Would occur maybe a few times a day and would last a few minutes before resolving spontaneously.  No associated nausea, vomitting, muscle aches, chest pain, palpitations, fevers, chills, blurry vision, ear ringing, ear fullness.  He does note a few episodes of diarrhea and a headache in the last few days.  He has not had any falls or trauma associated.  Due to these symptoms he presented to [facility] ED.  In the ED, he was afebrile with unremarkable vital signs.  Labwork remarkable for AG 15, Ca 8.0, AST 41, downtrending trops from 39 to 30, elevated lactate 2.6, elevated , and negative CT angio head/neck and CT a/p, unremarkable CXR He was given NS 1 L, meclizine 50, reglan 10 IV and admitted for further evaluation.         Problem/Plan - 1:  ·  Problem: Vertigo.  Plan: -Resolved. CT ,CTA noted .   -TTE noted.   -MRI and MRA noted. check orthostatic BP.CONT IVF     Problem/Plan - 2:  ·  Problem: Essential Hypertension. -controlled.c/w Lisinopril   Problem/Plan - 3:  ·  Problem: Mild cognitive impairment.  Plan:  Doing well. .      Problem/Plan - 4:  ·  Problem: Benign prostatic hyperplasia without lower urinary tract symptoms.  Plan: On dutasteride and Silodosin as outpatient.  Will hold silodosin as frequently causes some orthostatic hypotension  Continue dutasteride.      Problem/Plan - 5:  ·  Problem: Hyperlipidemia, unspecified hyperlipidemia type.  Plan: Statin      Problem/Plan - 6:  Problem: Hypocalcemia.Elevated intact PTH-hYPERPARATHYROIDISM?.  h/o Nephrolithiasis-Endo . F/U     Problem/Plan - 7:  ·  Problem: Need for prophylactic measure.  Plan: Improve score 1 for age.  SCD for DVT prophylaxis           78 year old man with a history of HTN, HLD, kidney stones, GERD, BPH, and possible dementia who presents with subacute dizziness vs vertigo.  He reports he was in his usual state of health until about 6-7 weeks ago when developed intermittent short lived dizziness.  He reports his symptoms would come on occasionally with walking 1-2 blocks and possibly had a room spinning sensation.  Would occur maybe a few times a day and would last a few minutes before resolving spontaneously.  No associated nausea, vomitting, muscle aches, chest pain, palpitations, fevers, chills, blurry vision, ear ringing, ear fullness.  He does note a few episodes of diarrhea and a headache in the last few days.  He has not had any falls or trauma associated.  Due to these symptoms he presented to [facility] ED.  In the ED, he was afebrile with unremarkable vital signs.  Labwork remarkable for AG 15, Ca 8.0, AST 41, downtrending trops from 39 to 30, elevated lactate 2.6, elevated , and negative CT angio head/neck and CT a/p, unremarkable CXR He was given NS 1 L, meclizine 50, reglan 10 IV and admitted for further evaluation.         Problem/Plan - 1:  ·  Problem: Vertigo.  Plan: -Resolved. .   -TTE  check orthostatic BP.CONT IVF     Problem/Plan - 2:  ·  Problem: Essential Hypertension. -controlled.c/w Lisinopril   Problem/Plan - 3:  ·  Problem: Mild cognitive impairment.  Plan:  Doing well. .      Problem/Plan - 4:  ·  Problem: Benign prostatic hyperplasia without lower urinary tract symptoms.  Plan: On dutasteride and Silodosin as outpatient.  Will hold silodosin as frequently causes some orthostatic hypotension  Continue dutasteride.      Problem/Plan - 5:  ·  Problem: Hyperlipidemia, unspecified hyperlipidemia type.  Plan: Statin      Problem/Plan - 6:  Problem: Hypocalcemia.Elevated intact PTH-hYPERPARATHYROIDISM?.  h/o Nephrolithiasis-Endo . F/U     Problem/Plan - 7:  ·  Problem: Need for prophylactic measure.  Plan: Improve score 1 for age.  SCD for DVT prophylaxis

## 2021-07-01 LAB
ALBUMIN SERPL ELPH-MCNC: 3.2 G/DL — LOW (ref 3.3–5)
ALP SERPL-CCNC: 66 U/L — SIGNIFICANT CHANGE UP (ref 40–120)
ALT FLD-CCNC: 10 U/L — SIGNIFICANT CHANGE UP (ref 4–41)
ANION GAP SERPL CALC-SCNC: 8 MMOL/L — SIGNIFICANT CHANGE UP (ref 7–14)
AST SERPL-CCNC: 14 U/L — SIGNIFICANT CHANGE UP (ref 4–40)
BASOPHILS # BLD AUTO: 0.04 K/UL — SIGNIFICANT CHANGE UP (ref 0–0.2)
BASOPHILS NFR BLD AUTO: 0.4 % — SIGNIFICANT CHANGE UP (ref 0–2)
BILIRUB SERPL-MCNC: 0.8 MG/DL — SIGNIFICANT CHANGE UP (ref 0.2–1.2)
BUN SERPL-MCNC: 11 MG/DL — SIGNIFICANT CHANGE UP (ref 7–23)
CALCIUM SERPL-MCNC: 7.7 MG/DL — LOW (ref 8.4–10.5)
CHLORIDE SERPL-SCNC: 106 MMOL/L — SIGNIFICANT CHANGE UP (ref 98–107)
CO2 SERPL-SCNC: 25 MMOL/L — SIGNIFICANT CHANGE UP (ref 22–31)
CREAT SERPL-MCNC: 0.69 MG/DL — SIGNIFICANT CHANGE UP (ref 0.5–1.3)
EOSINOPHIL # BLD AUTO: 0.27 K/UL — SIGNIFICANT CHANGE UP (ref 0–0.5)
EOSINOPHIL NFR BLD AUTO: 2.6 % — SIGNIFICANT CHANGE UP (ref 0–6)
GLUCOSE SERPL-MCNC: 102 MG/DL — HIGH (ref 70–99)
HCT VFR BLD CALC: 38.7 % — LOW (ref 39–50)
HGB BLD-MCNC: 12.4 G/DL — LOW (ref 13–17)
IANC: 7.65 K/UL — SIGNIFICANT CHANGE UP (ref 1.5–8.5)
IMM GRANULOCYTES NFR BLD AUTO: 0.5 % — SIGNIFICANT CHANGE UP (ref 0–1.5)
LYMPHOCYTES # BLD AUTO: 1.45 K/UL — SIGNIFICANT CHANGE UP (ref 1–3.3)
LYMPHOCYTES # BLD AUTO: 14 % — SIGNIFICANT CHANGE UP (ref 13–44)
MAGNESIUM SERPL-MCNC: 1.5 MG/DL — LOW (ref 1.6–2.6)
MCHC RBC-ENTMCNC: 29 PG — SIGNIFICANT CHANGE UP (ref 27–34)
MCHC RBC-ENTMCNC: 32 GM/DL — SIGNIFICANT CHANGE UP (ref 32–36)
MCV RBC AUTO: 90.6 FL — SIGNIFICANT CHANGE UP (ref 80–100)
MONOCYTES # BLD AUTO: 0.91 K/UL — HIGH (ref 0–0.9)
MONOCYTES NFR BLD AUTO: 8.8 % — SIGNIFICANT CHANGE UP (ref 2–14)
NEUTROPHILS # BLD AUTO: 7.65 K/UL — HIGH (ref 1.8–7.4)
NEUTROPHILS NFR BLD AUTO: 73.7 % — SIGNIFICANT CHANGE UP (ref 43–77)
NRBC # BLD: 0 /100 WBCS — SIGNIFICANT CHANGE UP
NRBC # FLD: 0 K/UL — SIGNIFICANT CHANGE UP
PHOSPHATE SERPL-MCNC: 1.8 MG/DL — LOW (ref 2.5–4.5)
PLATELET # BLD AUTO: 204 K/UL — SIGNIFICANT CHANGE UP (ref 150–400)
POTASSIUM SERPL-MCNC: 4 MMOL/L — SIGNIFICANT CHANGE UP (ref 3.5–5.3)
POTASSIUM SERPL-SCNC: 4 MMOL/L — SIGNIFICANT CHANGE UP (ref 3.5–5.3)
PROT SERPL-MCNC: 5.9 G/DL — LOW (ref 6–8.3)
RBC # BLD: 4.27 M/UL — SIGNIFICANT CHANGE UP (ref 4.2–5.8)
RBC # FLD: 13.2 % — SIGNIFICANT CHANGE UP (ref 10.3–14.5)
SODIUM SERPL-SCNC: 139 MMOL/L — SIGNIFICANT CHANGE UP (ref 135–145)
WBC # BLD: 10.37 K/UL — SIGNIFICANT CHANGE UP (ref 3.8–10.5)
WBC # FLD AUTO: 10.37 K/UL — SIGNIFICANT CHANGE UP (ref 3.8–10.5)

## 2021-07-01 PROCEDURE — 93880 EXTRACRANIAL BILAT STUDY: CPT | Mod: 26

## 2021-07-01 RX ORDER — CALCIUM GLUCONATE 100 MG/ML
2 VIAL (ML) INTRAVENOUS ONCE
Refills: 0 | Status: COMPLETED | OUTPATIENT
Start: 2021-07-01 | End: 2021-07-01

## 2021-07-01 RX ORDER — MAGNESIUM SULFATE 500 MG/ML
2 VIAL (ML) INJECTION ONCE
Refills: 0 | Status: COMPLETED | OUTPATIENT
Start: 2021-07-01 | End: 2021-07-01

## 2021-07-01 RX ORDER — SODIUM,POTASSIUM PHOSPHATES 278-250MG
1 POWDER IN PACKET (EA) ORAL
Refills: 0 | Status: COMPLETED | OUTPATIENT
Start: 2021-07-01 | End: 2021-07-02

## 2021-07-01 RX ADMIN — MAGNESIUM OXIDE 400 MG ORAL TABLET 400 MILLIGRAM(S): 241.3 TABLET ORAL at 09:41

## 2021-07-01 RX ADMIN — TAMSULOSIN HYDROCHLORIDE 0.4 MILLIGRAM(S): 0.4 CAPSULE ORAL at 21:20

## 2021-07-01 RX ADMIN — Medication 200 GRAM(S): at 11:40

## 2021-07-01 RX ADMIN — LISINOPRIL 20 MILLIGRAM(S): 2.5 TABLET ORAL at 05:06

## 2021-07-01 RX ADMIN — Medication 1 TABLET(S): at 18:02

## 2021-07-01 RX ADMIN — Medication 50 GRAM(S): at 09:39

## 2021-07-01 RX ADMIN — FINASTERIDE 5 MILLIGRAM(S): 5 TABLET, FILM COATED ORAL at 11:43

## 2021-07-01 RX ADMIN — Medication 1 TABLET(S): at 05:06

## 2021-07-01 RX ADMIN — Medication 30 MILLILITER(S): at 23:44

## 2021-07-01 RX ADMIN — Medication 1 PACKET(S): at 18:00

## 2021-07-01 RX ADMIN — Medication 90 MILLIGRAM(S): at 05:06

## 2021-07-01 RX ADMIN — MAGNESIUM OXIDE 400 MG ORAL TABLET 400 MILLIGRAM(S): 241.3 TABLET ORAL at 11:44

## 2021-07-01 RX ADMIN — Medication 1000 UNIT(S): at 11:44

## 2021-07-01 RX ADMIN — SODIUM CHLORIDE 75 MILLILITER(S): 9 INJECTION INTRAMUSCULAR; INTRAVENOUS; SUBCUTANEOUS at 09:38

## 2021-07-01 RX ADMIN — ENOXAPARIN SODIUM 40 MILLIGRAM(S): 100 INJECTION SUBCUTANEOUS at 18:02

## 2021-07-01 RX ADMIN — Medication 81 MILLIGRAM(S): at 11:44

## 2021-07-01 RX ADMIN — ATORVASTATIN CALCIUM 40 MILLIGRAM(S): 80 TABLET, FILM COATED ORAL at 21:20

## 2021-07-01 NOTE — CONSULT NOTE ADULT - SUBJECTIVE AND OBJECTIVE BOX
Sunil Cueto MD  Interventional Cardiology / Advance Heart Failure and Cardiac Transplant Specialist  [place] Office : [address] [place] [address] [place]. [address]  Tel:   [place] Office : [address] [facility] [place] [address]  Tel: [phone]  Cell : [phone]    HISTORY OF PRESENTING ILLNESS:  79 yo male PMHx HTN, HLD, CVA (ataxia as residual,) and Vertigo p/w generalized weakness, nausea, vomiting and diarrhea x2 days.  Last evening he was at his daughter's house where he ate chicken for dinner. Developed nausea, vomiting and diarrhea x 2 episodes and this morning 2 episodes as well. Emesis was bilious and diarrhea described as water, non-foul smelling. Furthermore, pt developed generalized weakness, dizziness and lightheadedness. EMS was called and pt taken to [facility] ED. Pt denies fever, chills, chest pain, sob, palpitations, diaphoresis, abdominal pain, headache, syncope or sick contacts. Patient denies any cardiac history. Follows with cardiologist had echo done 3 months ago reported normal. Pending carotid dopplers with outpatient cardiologist.   	  MEDICATIONS:  aspirin enteric coated 81 milliGRAM(s) Oral daily  enoxaparin Injectable 40 milliGRAM(s) SubCutaneous every 24 hours  lisinopril 20 milliGRAM(s) Oral daily  NIFEdipine XL 90 milliGRAM(s) Oral daily  tamsulosin 0.4 milliGRAM(s) Oral at bedtime        meclizine 25 milliGRAM(s) Oral every 8 hours PRN      atorvastatin 40 milliGRAM(s) Oral at bedtime  finasteride 5 milliGRAM(s) Oral daily    calcium carbonate   1250 mG (OsCal) 1 Tablet(s) Oral two times a day  cholecalciferol 1000 Unit(s) Oral daily  potassium phosphate / sodium phosphate Powder (PHOS-NaK) 1 Packet(s) Oral two times a day  sodium chloride 0.9%. 1000 milliLiter(s) IV Continuous <Continuous>      PAST MEDICAL/SURGICAL HISTORY  PAST MEDICAL & SURGICAL HISTORY:  CVA (Cerebral Infarction)  Ataxia as residual    Secondary Seizure Disorder    PU (Peptic Ulcer)    Essential Hypertension    Hyperlipidemia    Vertigo    Status Post Inguinal Hernia Repair    Nephrolithiasis  s/p URS stone extraction 2012        SOCIAL HISTORY: Substance Use (street drugs): ( x ) never used  (  ) other:    FAMILY HISTORY:  Acute myocardial infarction        REVIEW OF SYSTEMS:  CONSTITUTIONAL: No fever, weight loss, or fatigue  EYES: No eye pain, visual disturbances, or discharge  ENMT:  No difficulty hearing, tinnitus, vertigo; No sinus or throat pain  BREASTS: No pain, masses, or nipple discharge  GASTROINTESTINAL: No abdominal or epigastric pain. No nausea, vomiting, or hematemesis; No diarrhea or constipation. No melena or hematochezia.  GENITOURINARY: No dysuria, frequency, hematuria, or incontinence  NEUROLOGICAL: No headaches, memory loss, loss of strength, numbness, or tremors  ENDOCRINE: No heat or cold intolerance; No hair loss  MUSCULOSKELETAL: No joint pain or swelling; No muscle, back, or extremity pain  PSYCHIATRIC: No depression, anxiety, mood swings, or difficulty sleeping  HEME/LYMPH: No easy bruising, or bleeding gums  All others negative    PHYSICAL EXAM:  T(C): 36.6 (07-01-21 @ 10:51), Max: 36.7 (06-30-21 @ 21:09)  HR: 88 (07-01-21 @ 10:51) (74 - 88)  BP: 110/73 (07-01-21 @ 10:51) (110/73 - 126/79)  RR: 17 (07-01-21 @ 10:51) (17 - 17)  SpO2: 95% (07-01-21 @ 10:51) (94% - 96%)  Wt(kg): --  I&O's Summary    30 Jun 2021 07:01 - 01 Jul 2021 07:00  --------------------------------------------------------  IN: 1038 mL / OUT: 1850 mL / NET: -812 mL    01 Jul 2021 07:01 - 01 Jul 2021 14:53  --------------------------------------------------------  IN: 358 mL / OUT: 1475 mL / NET: -1117 mL          GENERAL: NAD  EYES: EOMI, PERRLA, conjunctiva and sclera clear  ENMT: No tonsillar erythema, exudates, or enlargement  Cardiovascular: Normal S1 S2, No JVD, No murmurs, No edema  Respiratory: Lungs clear to auscultation	  Gastrointestinal:  Soft, Non-tender, + BS	  Extremities: No edema                                      12.4   10.37 )-----------( 204      ( 01 Jul 2021 07:43 )             38.7     07-01    139  |  106  |  11  ----------------------------<  102<H>  4.0   |  25  |  0.69    Ca    7.7<L>      01 Jul 2021 07:43  Phos  1.8     07-01  Mg     1.50     07-01    TPro  5.9<L>  /  Alb  3.2<L>  /  TBili  0.8  /  DBili  x   /  AST  14  /  ALT  10  /  AlkPhos  66  07-01    proBNP:   Lipid Profile:   HgA1c:   TSH:     Consultant(s) Notes Reviewed:  [x ] YES  [ ] NO    Care Discussed with Consultants/Other Providers [ x] YES  [ ] NO    Imaging Personally Reviewed independently:  [x] YES  [ ] NO    All labs, radiologic studies, vitals, orders and medications list reviewed. Patient is seen and examined at bedside. Case discussed with medical team.

## 2021-07-01 NOTE — PROGRESS NOTE ADULT - ASSESSMENT
78 year old man with a history of HTN, HLD, kidney stones, GERD, BPH, and possible dementia who presents with subacute dizziness vs vertigo.  He reports he was in his usual state of health until about 6-7 weeks ago when developed intermittent short lived dizziness.  He reports his symptoms would come on occasionally with walking 1-2 blocks and possibly had a room spinning sensation.  Would occur maybe a few times a day and would last a few minutes before resolving spontaneously.  No associated nausea, vomitting, muscle aches, chest pain, palpitations, fevers, chills, blurry vision, ear ringing, ear fullness.  He does note a few episodes of diarrhea and a headache in the last few days.  He has not had any falls or trauma associated.  Due to these symptoms he presented to [facility] ED.  In the ED, he was afebrile with unremarkable vital signs.  Labwork remarkable for AG 15, Ca 8.0, AST 41, downtrending trops from 39 to 30, elevated lactate 2.6, elevated , and negative CT angio head/neck and CT a/p, unremarkable CXR He was given NS 1 L, meclizine 50, reglan 10 IV and admitted for further evaluation.         Problem/Plan - 1:  ·  Problem: Vertigo.  Plan: -Resolved. .   -TTE-nl outpt  check orthostatic BP.CONT IVF.Cardiology consult appreciated.     Problem/Plan - 2:  ·  Problem: Essential Hypertension. -controlled.c/w Lisinopril,Nifedepine   Problem/Plan - 3:  ·  Problem: Mild cognitive impairment.  Plan:  Doing well. .      Problem/Plan - 4:  ·  Problem: Benign prostatic hyperplasia without lower urinary tract symptoms.  Plan: On dutasteride and Silodosin as outpatient.  Will hold silodosin as frequently causes some orthostatic hypotension  Continue dutasteride.      Problem/Plan - 5:  ·  Problem: Hyperlipidemia, unspecified hyperlipidemia type.  Plan: Statin      Problem/Plan - 6:  Problem: Hypocalcemia.,Hypomagnesemia,Hypophosphatemia-supplement Elevated intact PTH-hYPERPARATHYROIDISM?.  h/o Nephrolithiasis-Endo . F/U     Problem/Plan - 7:  ·  Problem: Need for prophylactic measure.  Plan: Improve score 1 for age.  SCD for DVT prophylaxis

## 2021-07-01 NOTE — CONSULT NOTE ADULT - ASSESSMENT
Assessment and Plan    81 yo male PMHx HTN, HLD, CVA (ataxia as residual,) and Vertigo p/w generalized weakness, nausea, vomiting and diarrhea x2 days.    EKG: NSR non-specific STT changes  Tele: NSR PACs    1. Dizziness  -describes as lightheaded sensation  -orthostatics borderline on June 29th  -obtain repeat orthostatics  -Echo done outpatient 3 months ago reported normal  -will get carotid dopplers    2. HTN  -controlled  -c/w lisinopril and nifedipine  -continue to monitor BP     3. DVT prophylaxis  -lovenox subq

## 2021-07-02 ENCOUNTER — TRANSCRIPTION ENCOUNTER (OUTPATIENT)
Age: 81
End: 2021-07-02

## 2021-07-02 LAB
ALBUMIN SERPL ELPH-MCNC: 3.5 G/DL — SIGNIFICANT CHANGE UP (ref 3.3–5)
ALP SERPL-CCNC: 75 U/L — SIGNIFICANT CHANGE UP (ref 40–120)
ALT FLD-CCNC: 12 U/L — SIGNIFICANT CHANGE UP (ref 4–41)
ANION GAP SERPL CALC-SCNC: 11 MMOL/L — SIGNIFICANT CHANGE UP (ref 7–14)
AST SERPL-CCNC: 15 U/L — SIGNIFICANT CHANGE UP (ref 4–40)
BASOPHILS # BLD AUTO: 0.04 K/UL — SIGNIFICANT CHANGE UP (ref 0–0.2)
BASOPHILS NFR BLD AUTO: 0.3 % — SIGNIFICANT CHANGE UP (ref 0–2)
BILIRUB SERPL-MCNC: 0.8 MG/DL — SIGNIFICANT CHANGE UP (ref 0.2–1.2)
BUN SERPL-MCNC: 15 MG/DL — SIGNIFICANT CHANGE UP (ref 7–23)
CALCIUM SERPL-MCNC: 8.5 MG/DL — SIGNIFICANT CHANGE UP (ref 8.4–10.5)
CHLORIDE SERPL-SCNC: 103 MMOL/L — SIGNIFICANT CHANGE UP (ref 98–107)
CO2 SERPL-SCNC: 24 MMOL/L — SIGNIFICANT CHANGE UP (ref 22–31)
CREAT SERPL-MCNC: 0.73 MG/DL — SIGNIFICANT CHANGE UP (ref 0.5–1.3)
EOSINOPHIL # BLD AUTO: 0.01 K/UL — SIGNIFICANT CHANGE UP (ref 0–0.5)
EOSINOPHIL NFR BLD AUTO: 0.1 % — SIGNIFICANT CHANGE UP (ref 0–6)
GLUCOSE BLDC GLUCOMTR-MCNC: 113 MG/DL — HIGH (ref 70–99)
GLUCOSE SERPL-MCNC: 134 MG/DL — HIGH (ref 70–99)
HCT VFR BLD CALC: 43.8 % — SIGNIFICANT CHANGE UP (ref 39–50)
HGB BLD-MCNC: 14 G/DL — SIGNIFICANT CHANGE UP (ref 13–17)
IANC: 10.8 K/UL — HIGH (ref 1.5–8.5)
IMM GRANULOCYTES NFR BLD AUTO: 0.3 % — SIGNIFICANT CHANGE UP (ref 0–1.5)
LYMPHOCYTES # BLD AUTO: 0.83 K/UL — LOW (ref 1–3.3)
LYMPHOCYTES # BLD AUTO: 6.7 % — LOW (ref 13–44)
MAGNESIUM SERPL-MCNC: 1.9 MG/DL — SIGNIFICANT CHANGE UP (ref 1.6–2.6)
MCHC RBC-ENTMCNC: 28.6 PG — SIGNIFICANT CHANGE UP (ref 27–34)
MCHC RBC-ENTMCNC: 32 GM/DL — SIGNIFICANT CHANGE UP (ref 32–36)
MCV RBC AUTO: 89.4 FL — SIGNIFICANT CHANGE UP (ref 80–100)
MONOCYTES # BLD AUTO: 0.71 K/UL — SIGNIFICANT CHANGE UP (ref 0–0.9)
MONOCYTES NFR BLD AUTO: 5.7 % — SIGNIFICANT CHANGE UP (ref 2–14)
NEUTROPHILS # BLD AUTO: 10.8 K/UL — HIGH (ref 1.8–7.4)
NEUTROPHILS NFR BLD AUTO: 86.9 % — HIGH (ref 43–77)
NRBC # BLD: 0 /100 WBCS — SIGNIFICANT CHANGE UP
NRBC # FLD: 0 K/UL — SIGNIFICANT CHANGE UP
PHOSPHATE SERPL-MCNC: 1.6 MG/DL — LOW (ref 2.5–4.5)
PLATELET # BLD AUTO: 252 K/UL — SIGNIFICANT CHANGE UP (ref 150–400)
POTASSIUM SERPL-MCNC: 4.1 MMOL/L — SIGNIFICANT CHANGE UP (ref 3.5–5.3)
POTASSIUM SERPL-SCNC: 4.1 MMOL/L — SIGNIFICANT CHANGE UP (ref 3.5–5.3)
PROT SERPL-MCNC: 6.7 G/DL — SIGNIFICANT CHANGE UP (ref 6–8.3)
RBC # BLD: 4.9 M/UL — SIGNIFICANT CHANGE UP (ref 4.2–5.8)
RBC # FLD: 13.3 % — SIGNIFICANT CHANGE UP (ref 10.3–14.5)
SODIUM SERPL-SCNC: 138 MMOL/L — SIGNIFICANT CHANGE UP (ref 135–145)
WBC # BLD: 12.43 K/UL — HIGH (ref 3.8–10.5)
WBC # FLD AUTO: 12.43 K/UL — HIGH (ref 3.8–10.5)

## 2021-07-02 PROCEDURE — 70450 CT HEAD/BRAIN W/O DYE: CPT | Mod: 26

## 2021-07-02 PROCEDURE — 99222 1ST HOSP IP/OBS MODERATE 55: CPT | Mod: GC

## 2021-07-02 PROCEDURE — 74019 RADEX ABDOMEN 2 VIEWS: CPT | Mod: 26

## 2021-07-02 RX ORDER — PANTOPRAZOLE SODIUM 20 MG/1
40 TABLET, DELAYED RELEASE ORAL ONCE
Refills: 0 | Status: COMPLETED | OUTPATIENT
Start: 2021-07-02 | End: 2021-07-02

## 2021-07-02 RX ORDER — MAGNESIUM SULFATE 500 MG/ML
1 VIAL (ML) INJECTION ONCE
Refills: 0 | Status: COMPLETED | OUTPATIENT
Start: 2021-07-02 | End: 2021-07-02

## 2021-07-02 RX ORDER — ONDANSETRON 8 MG/1
4 TABLET, FILM COATED ORAL ONCE
Refills: 0 | Status: COMPLETED | OUTPATIENT
Start: 2021-07-02 | End: 2021-07-02

## 2021-07-02 RX ORDER — ONDANSETRON 8 MG/1
4 TABLET, FILM COATED ORAL EVERY 6 HOURS
Refills: 0 | Status: DISCONTINUED | OUTPATIENT
Start: 2021-07-02 | End: 2021-07-06

## 2021-07-02 RX ADMIN — Medication 1 TABLET(S): at 05:42

## 2021-07-02 RX ADMIN — ONDANSETRON 4 MILLIGRAM(S): 8 TABLET, FILM COATED ORAL at 10:20

## 2021-07-02 RX ADMIN — PANTOPRAZOLE SODIUM 40 MILLIGRAM(S): 20 TABLET, DELAYED RELEASE ORAL at 11:12

## 2021-07-02 RX ADMIN — Medication 25 MILLIGRAM(S): at 22:14

## 2021-07-02 RX ADMIN — ENOXAPARIN SODIUM 40 MILLIGRAM(S): 100 INJECTION SUBCUTANEOUS at 16:39

## 2021-07-02 RX ADMIN — ONDANSETRON 4 MILLIGRAM(S): 8 TABLET, FILM COATED ORAL at 05:41

## 2021-07-02 RX ADMIN — ATORVASTATIN CALCIUM 40 MILLIGRAM(S): 80 TABLET, FILM COATED ORAL at 22:14

## 2021-07-02 RX ADMIN — TAMSULOSIN HYDROCHLORIDE 0.4 MILLIGRAM(S): 0.4 CAPSULE ORAL at 22:14

## 2021-07-02 RX ADMIN — Medication 25 MILLIGRAM(S): at 05:42

## 2021-07-02 RX ADMIN — Medication 100 GRAM(S): at 09:49

## 2021-07-02 RX ADMIN — LISINOPRIL 20 MILLIGRAM(S): 2.5 TABLET ORAL at 05:42

## 2021-07-02 RX ADMIN — SODIUM CHLORIDE 75 MILLILITER(S): 9 INJECTION INTRAMUSCULAR; INTRAVENOUS; SUBCUTANEOUS at 09:49

## 2021-07-02 RX ADMIN — Medication 90 MILLIGRAM(S): at 05:42

## 2021-07-02 NOTE — DISCHARGE NOTE NURSING/CASE MANAGEMENT/SOCIAL WORK - PATIENT PORTAL LINK FT
You can access the FollowMyHealth Patient Portal offered by [facility] by registering at the following website: [email or web address]. By joining [facility]’s FollowMyHealth portal, you will also be able to view your health information using other applications (apps) compatible with our system.

## 2021-07-02 NOTE — CONSULT NOTE ADULT - SUBJECTIVE AND OBJECTIVE BOX
HPI:  81 yo male PMHx HTN, HLD, CVA (ataxia as residual,) and Vertigo p/w generalized weakness, nausea, vomiting and diarrhea x2 days.  Last evening he was at his daughter's house where he ate chicken for dinner. Then last night developed nausea, vomiting and diarrhea x 2 episodes and this morning 2 episodes as well. Emesis was bilious and diarrhea described as water, non-foul smelling. Furthermore, pt developed generalized weakness, dizziness and lightheadedness. EMS was called and pt taken to [facility] ED. Pt denies fever, chills, chest pain, sob, palpitations, diaphoresis, abdominal pain, headache, syncope or sick contacts.  In ED pt found to be hypocalcemic and hypomagnesemic.  He was given IV NS x1L, Mg Sulfate IV 2g and IV Ca Gluconate 2g where his symptoms improved. In general pt is poor historian, doesn't know his home meds. Med Rec pharmacist asked to assist with home med rec. Multiple attempts made to reach his wife, Adwao at [phone] without success.     GI consult was called for nausea and vomiting       Allergies:  No Known Allergies    Home Medications:  aspirin 81 mg oral tablet, chewable: 1 tab(s) orally once a day (28 Jun 2021 18:57)  atorvastatin 40 mg oral tablet: 1 tab(s) orally once a day (28 Jun 2021 18:57)  dutasteride 0.5 mg oral capsule: 1 cap(s) orally once a day (28 Jun 2021 18:57)  lisinopril 20 mg oral tablet: 1 tab(s) orally once a day (28 Jun 2021 18:57)  NIFEdipine 90 mg oral tablet, extended release: 1 tab(s) orally once a day (28 Jun 2021 18:57)  pantoprazole 40 mg oral delayed release tablet: 1 tab(s) orally once a day (28 Jun 2021 18:57)  silodosin 8 mg oral capsule: 1 cap(s) orally once a day (28 Jun 2021 18:57)      Hospital Medications:  aluminum hydroxide/magnesium hydroxide/simethicone Suspension 30 milliLiter(s) Oral every 6 hours PRN  aspirin enteric coated 81 milliGRAM(s) Oral daily  atorvastatin 40 milliGRAM(s) Oral at bedtime  calcium carbonate   1250 mG (OsCal) 1 Tablet(s) Oral two times a day  cholecalciferol 1000 Unit(s) Oral daily  enoxaparin Injectable 40 milliGRAM(s) SubCutaneous every 24 hours  finasteride 5 milliGRAM(s) Oral daily  lisinopril 20 milliGRAM(s) Oral daily  meclizine 25 milliGRAM(s) Oral every 8 hours PRN  NIFEdipine XL 90 milliGRAM(s) Oral daily  ondansetron Injectable 4 milliGRAM(s) IV Push every 6 hours PRN  sodium chloride 0.9%. 1000 milliLiter(s) IV Continuous <Continuous>  tamsulosin 0.4 milliGRAM(s) Oral at bedtime      PMHX/PSHX:  CVA (Cerebral Infarction)  Secondary Seizure Disorder  PU (Peptic Ulcer)  Essential Hypertension  Hyperlipidemia  Vertigo  Status Post Inguinal Hernia Repair  Nephrolithiasis      Family history:  Acute myocardial infarction        Social History: no smoking    ROS:   General:  No fevers, chills or night sweats  ENT:  No sore throat or dysphagia  CV:  No pain or palpitations  Resp:  No dyspnea, cough or  wheezing  GI:  as above  Skin:  No rash or edema  Neuro: no weakness   Hematologic: no bleeding  Musculoskeletal: no muscle pain or join pain  Psych: no agitation     : no dysuria      PHYSICAL EXAM:   GENERAL:  NAD, Appears stated age  HEENT:  NC/AT,  conjunctivae clear and pink, sclera -anicteric  CHEST:  CTA B/L, Normal effort  HEART:  RRR S1/S2,  ABDOMEN:  Soft, non-tender, non-distended,  no masses   EXTREMITIES:  No cyanosis or Edema  SKIN:  Warm & Dry. No rash or erythema  NEURO:  Alert, oriented, no focal deficit    Vital Signs:  Vital Signs Last 24 Hrs  T(C): 36.7 (02 Jul 2021 12:30), Max: 36.8 (02 Jul 2021 05:20)  T(F): 98 (02 Jul 2021 12:30), Max: 98.2 (02 Jul 2021 05:20)  HR: 95 (02 Jul 2021 12:30) (87 - 95)  BP: 149/90 (02 Jul 2021 12:30) (127/60 - 154/94)  BP(mean): --  RR: 19 (02 Jul 2021 12:30) (17 - 19)  SpO2: 93% (02 Jul 2021 12:30) (92% - 97%)  Daily     Daily     LABS:                        14.0   12.43 )-----------( 252      ( 02 Jul 2021 07:30 )             43.8     Mean Cell Volume: 89.4 fL (07-02-21 @ 07:30)    07-02    138  |  103  |  15  ----------------------------<  134<H>  4.1   |  24  |  0.73    Ca    8.5      02 Jul 2021 07:30  Phos  1.6     07-02  Mg     1.90     07-02    TPro  6.7  /  Alb  3.5  /  TBili  0.8  /  DBili  x   /  AST  15  /  ALT  12  /  AlkPhos  75  07-02    LIVER FUNCTIONS - ( 02 Jul 2021 07:30 )  Alb: 3.5 g/dL / Pro: 6.7 g/dL / ALK PHOS: 75 U/L / ALT: 12 U/L / AST: 15 U/L / GGT: x                                       14.0   12.43 )-----------( 252      ( 02 Jul 2021 07:30 )             43.8                         12.4   10.37 )-----------( 204      ( 01 Jul 2021 07:43 )             38.7                         12.1   10.05 )-----------( 194      ( 30 Jun 2021 06:54 )             37.9     Imaging:  < from: Xray Abdomen 2 Views (07.02.21 @ 12:22) >  EXAM:  XR ABDOMEN 2V        PROCEDURE DATE:  Jul 2 2021         INTERPRETATION:  TIME OF EXAM: July 2, 2021 at 12:20 PM    CLINICAL INFORMATION: Nausea and vomiting.    TECHNIQUE:   Portable supine and upright abdomen    INTERPRETATION:    Study is limited due to patient's habitus. There is a nonobstructive gas pattern with considerable bowl gaseous distention of the stomach but with visible normal caliber small and large bowel loops seen distally.    No masses or pathologic calcifications. Nofree air.      COMPARISON:  None available      IMPRESSION:  Nonobstructive gas pattern.    < end of copied text >       HPI: 80M with HTN, HLD, CVA with residual ataxia, GERD, vertigo who presented with weakness, nausea and vomiting. GI was consulted for ongoing nausea and vomiting.     Per chart review, patient found to be hypocalcemic and hypomagnesemic upon arrival in ED. He was given IV NS x1L, Mg Sulfate IV 2g and IV Ca Gluconate 2g where his symptoms improved. He has reported ongoing weakness, though improved, since admission.     At time of GI evaluation today, patient reported no further diarrhea, but has nausea and vomiting only with episodes of dizziness/vertigo. He denies nausea or vomiting when dizziness is controlled. He reports emesis is dark, most recently minimal sputum with dark flecks. Denies any diarrhea or constipation (though per chart, patient recently had altered bowel habits). Denies abdominal pain.     Patient follows with Dr. Federico Longoria, most recently seen 5/2021. He had had a recent episode of self-limited gastroenteritis, but symptoms had resolved by the time of his 5/2021 visit. He has chronic GERD for which he takes daily Pantoprazole 40mg.     COLONOSCOPY 8/3/12: Seven small polyps were removed of which 4 were adenomas and 3 were hyperplastic polyps. Sigmoid diverticulosis was noted. Hemorrhoids were detected.   EGD 8/3/2012: An upper endoscopy was performed at the same session and the previously noted prepyloric antral diverticulum was again detected but was less prominent. A small 1 cm hiatus hernia was noted. Fundic gland gastric polyps were detected. A gastric biopsy tested negative for Helicobacter pylori.    COLONOSCOPY 7/11/16: -Colonoscopy was performed on 7/11/2016 at which time 8 colonic polyps were noted that were mostly small ranging in size from 3 to 5 mm. Most were in the ascending colon. The largest was a 10 mm proximal transverse colon sessile polyp. These polyps were 4 adenomas, 2 hyperplastic polyps and a descending colon sessile serrated polyp. Sigmoid diverticulosis was noted. Hemorrhoids were detected.    Allergies:  No Known Allergies    Home Medications:  aspirin 81 mg oral tablet, chewable: 1 tab(s) orally once a day (28 Jun 2021 18:57)  atorvastatin 40 mg oral tablet: 1 tab(s) orally once a day (28 Jun 2021 18:57)  dutasteride 0.5 mg oral capsule: 1 cap(s) orally once a day (28 Jun 2021 18:57)  lisinopril 20 mg oral tablet: 1 tab(s) orally once a day (28 Jun 2021 18:57)  NIFEdipine 90 mg oral tablet, extended release: 1 tab(s) orally once a day (28 Jun 2021 18:57)  pantoprazole 40 mg oral delayed release tablet: 1 tab(s) orally once a day (28 Jun 2021 18:57)  silodosin 8 mg oral capsule: 1 cap(s) orally once a day (28 Jun 2021 18:57)      Hospital Medications:  aluminum hydroxide/magnesium hydroxide/simethicone Suspension 30 milliLiter(s) Oral every 6 hours PRN  aspirin enteric coated 81 milliGRAM(s) Oral daily  atorvastatin 40 milliGRAM(s) Oral at bedtime  calcium carbonate   1250 mG (OsCal) 1 Tablet(s) Oral two times a day  cholecalciferol 1000 Unit(s) Oral daily  enoxaparin Injectable 40 milliGRAM(s) SubCutaneous every 24 hours  finasteride 5 milliGRAM(s) Oral daily  lisinopril 20 milliGRAM(s) Oral daily  meclizine 25 milliGRAM(s) Oral every 8 hours PRN  NIFEdipine XL 90 milliGRAM(s) Oral daily  ondansetron Injectable 4 milliGRAM(s) IV Push every 6 hours PRN  sodium chloride 0.9%. 1000 milliLiter(s) IV Continuous <Continuous>  tamsulosin 0.4 milliGRAM(s) Oral at bedtime      PMHX/PSHX:  CVA (Cerebral Infarction)  Secondary Seizure Disorder  PU (Peptic Ulcer)  Essential Hypertension  Hyperlipidemia  Vertigo  Status Post Inguinal Hernia Repair  Nephrolithiasis      Family history:  Acute myocardial infarction    Social History: no smoking    ROS:   General:  No fevers, chills or night sweats  ENT:  No sore throat or dysphagia  CV:  No pain or palpitations  Resp:  No dyspnea, cough or  wheezing  GI:  as above  Skin:  No rash or edema  Neuro: no weakness   Hematologic: no bleeding  Musculoskeletal: no muscle pain or join pain  Psych: no agitation     : no dysuria      PHYSICAL EXAM:   GENERAL:  NAD, Appears stated age, obese   HEENT:  NC/AT, conjunctivae clear and pink, sclera -anicteric  CHEST:  CTA B/L, Normal effort  HEART: Normal rate and rhythm, no edema  ABDOMEN:  Soft, non-tender, non-distended,  no masses, central obesity  SKIN:  Warm & Dry. Chronic changes in LE consistent with stasis  NEURO:  no focal deficit, no tremor  PSYCH: Appropriate affect and mood, AAOx3    Vital Signs:  Vital Signs Last 24 Hrs  T(C): 36.7 (02 Jul 2021 12:30), Max: 36.8 (02 Jul 2021 05:20)  T(F): 98 (02 Jul 2021 12:30), Max: 98.2 (02 Jul 2021 05:20)  HR: 95 (02 Jul 2021 12:30) (87 - 95)  BP: 149/90 (02 Jul 2021 12:30) (127/60 - 154/94)  BP(mean): --  RR: 19 (02 Jul 2021 12:30) (17 - 19)  SpO2: 93% (02 Jul 2021 12:30) (92% - 97%)  Daily     Daily     LABS:                        14.0   12.43 )-----------( 252      ( 02 Jul 2021 07:30 )             43.8     Mean Cell Volume: 89.4 fL (07-02-21 @ 07:30)    07-02    138  |  103  |  15  ----------------------------<  134<H>  4.1   |  24  |  0.73    Ca    8.5      02 Jul 2021 07:30  Phos  1.6     07-02  Mg     1.90     07-02    TPro  6.7  /  Alb  3.5  /  TBili  0.8  /  DBili  x   /  AST  15  /  ALT  12  /  AlkPhos  75  07-02    LIVER FUNCTIONS - ( 02 Jul 2021 07:30 )  Alb: 3.5 g/dL / Pro: 6.7 g/dL / ALK PHOS: 75 U/L / ALT: 12 U/L / AST: 15 U/L / GGT: x                                       14.0   12.43 )-----------( 252      ( 02 Jul 2021 07:30 )             43.8                         12.4   10.37 )-----------( 204      ( 01 Jul 2021 07:43 )             38.7                         12.1   10.05 )-----------( 194      ( 30 Jun 2021 06:54 )             37.9     Imaging:  < from: Xray Abdomen 2 Views (07.02.21 @ 12:22) >  EXAM:  XR ABDOMEN 2V        PROCEDURE DATE:  Jul 2 2021         INTERPRETATION:  TIME OF EXAM: July 2, 2021 at 12:20 PM    CLINICAL INFORMATION: Nausea and vomiting.    TECHNIQUE:   Portable supine and upright abdomen    INTERPRETATION:    Study is limited due to patient's habitus. There is a nonobstructive gas pattern with considerable bowl gaseous distention of the stomach but with visible normal caliber small and large bowel loops seen distally.    No masses or pathologic calcifications. Nofree air.      COMPARISON:  None available      IMPRESSION:  Nonobstructive gas pattern.    < end of copied text >

## 2021-07-02 NOTE — PROGRESS NOTE ADULT - ASSESSMENT
Assessment and Plan    81 yo male PMHx HTN, HLD, CVA (ataxia as residual,) and Vertigo p/w generalized weakness, nausea, vomiting and diarrhea x2 days.    EKG: NSR non-specific STT changes  Tele: NSR PACs    1. Dizziness  -describes as lightheaded sensation  -orthostatics borderline on June 29th  -obtain repeat orthostatics  - Echo done outpatient 3 months ago reported normal  - carotid dopplers shows 16 - 49%    2. HTN  -controlled  -c/w lisinopril and nifedipine  -continue to monitor BP     3. DVT prophylaxis  -lovenox subq

## 2021-07-02 NOTE — CHART NOTE - NSCHARTNOTEFT_GEN_A_CORE
Alerted by RN, Pt with vomiting which started overnight and is still persistent. Pt is resting in bed with basin in hands, c/o several episodes of vomiting brown clear fluid, unable to keep down his AM medications. He is denying dizziness at this time. He admits this is what brought him into the hospital, but it had resolved temporarily over the last day or so. Denies any pain at this time including abdominal pain, headache, chest pain. Only c/o nausea and vomiting. Reports last BM was last night, he states it was not normal, but unable to elaborate. Denies diarrhea. He denies HA, dizziness, change in vision, numbness/tingling, paresthesia.    Vital Signs Last 24 Hrs  T(F): 98 HR: 95 BP: 149/90 RR 19 SpO2: 93% (92% - 97%)  A+O x 3, appears uncomfortable due to n/v  RRR +S1S2  CTA b/l no w/r/r  Abd: obese, NT, abd appears distended, but Pt reports this is his normal. BS NA x 4 quadrants. No rebound tenderness  LE: no edema/cyanosis                          14.0   12.43 )-----------( 252      ( 02 Jul 2021 07:30 )             43.8   07-02    138  |  103  |  15  ----------------------------<  134<H>  4.1   |  24  |  0.73    Ca    8.5      02 Jul 2021 07:30  Phos  1.6     07-02  Mg     1.90     07-02    TPro  6.7  /  Alb  3.5  /  TBili  0.8  /  DBili  x   /  AST  15  /  ALT  12  /  AlkPhos  75  07-02    81yo M with a PMHx of HTN, HLD, CVA (ataxia as residual,) and Vertigo, p/w generalized weakness, nausea, vomiting and diarrhea x 2 days, admitted with electrolyte imbalance, now with recurrent episode of n/v.   1) Nausea/vomiting: Discussed with Dr. Graves Pt given Zofran PRN, Protonix IV x 1. Pt reports he follows with Dr. Lognoria as outpatient, house GI consult called. Pt with h/o vertigo, but is not reporting dizziness at this time. If dizziness/vertigo recurs consider CT head.  Will continue to monitor to closely and reassess. f/u GI recommendations. Alerted by RN, Pt with vomiting which started overnight and is still persistent. Pt is resting in bed with basin in hands, c/o several episodes of vomiting brown clear fluid, unable to keep down his AM medications. He is denying dizziness at this time. He admits this is what brought him into the hospital, but it had resolved temporarily over the last day or so. Denies any pain at this time including abdominal pain, headache, chest pain. Only c/o nausea and vomiting. Reports last BM was last night, he states it was not normal, but unable to elaborate. Denies diarrhea. He denies HA, dizziness, change in vision, numbness/tingling, paresthesia.    Vital Signs Last 24 Hrs  T(F): 98 HR: 95 BP: 149/90 RR 19 SpO2: 93% (92% - 97%)  A+O x 3, appears uncomfortable due to n/v  RRR +S1S2  CTA b/l no w/r/r  Abd: obese, NT, abd appears distended, but Pt reports this is his normal. BS NA x 4 quadrants. No rebound tenderness  LE: no edema/cyanosis  Neuro: Face symmetric, speech clear, language fluent, MS intact x 4 extremities, sensation intact.                          14.0   12.43 )-----------( 252      ( 02 Jul 2021 07:30 )             43.8   07-02    138  |  103  |  15  ----------------------------<  134<H>  4.1   |  24  |  0.73    Ca    8.5      02 Jul 2021 07:30  Phos  1.6     07-02  Mg     1.90     07-02    TPro  6.7  /  Alb  3.5  /  TBili  0.8  /  DBili  x   /  AST  15  /  ALT  12  /  AlkPhos  75  07-02    81yo M with a PMHx of HTN, HLD, CVA (ataxia as residual,) and Vertigo, p/w generalized weakness, nausea, vomiting and diarrhea x 2 days, admitted with electrolyte imbalance, now with recurrent episode of n/v.   1) Nausea/vomiting: Discussed with Dr. Graves Pt given Zofran PRN, Protonix IV x 1. Pt reports he follows with Dr. Longoria as outpatient, house GI consult called. Pt with h/o vertigo, but is not reporting dizziness at this time. If dizziness/vertigo recurs consider CT head.  Will continue to monitor to closely and reassess. f/u GI recommendations.

## 2021-07-02 NOTE — PROVIDER CONTACT NOTE (OTHER) - ASSESSMENT
Patient nauseas, no complaints of pain. Medications given per orders and Tele CORAL Haskins aware patient continued with vomiting and nausea for several hours, slight relief and decrease in emesis. patient is on IVF ordered.

## 2021-07-02 NOTE — DISCHARGE NOTE NURSING/CASE MANAGEMENT/SOCIAL WORK - NSDPLANG ASIS_GEN_ALL_CORE
"November 28, 2017    Tova Gill  [facility] [address] [place] [id number]         LIST OF APPROPRIATE SCHOOL-BASED ACCOMMODATIONS AND  INTERVENTIONS FOR STUDENTS WITH ADHD/ADD    Provide this Student with Low-Distraction Work Areas and seating for tests and assignments.   It is the responsibility of the teacher to take the initiative to privately and discretely (do not draw peer attention to the student) "send" this student to a quiet, distraction-free room/area for each testing session. It is important to assure that once the student begins a task requiring a quiet, distraction-free environment that no interruptions be permitted until the student is finished.    Always seat this student near the source of instruction and/or stand near student when giving instructions.  This will help the student by reducing barriers and distractions between him and the lesson. For this reason it is important to encourage the student to sit near positive role models to ease the distractions from other students with challenging or diverting behaviors.    Prepare the student in preparing for the end of the day and going home, supervise the students book bag for necessary items needed for homework.    Allow the student to move around. Provide opportunities for physical action - pace in the rear of the classroom, do an errand, wash the blackboard, get a drink of water, go to the bathroom, etc. Make sure the student is always provided opportunities for physical activities.     Do not use daily recess as a time to make-up missed schoolwork. Do not remove daily recess as punishment.    Permit the student to play with small objects kept in their desks that can be manipulated quietly, such as a soft squeeze ball.    Make sure all homework instruction and assignments are clear and provided in writing (not simply aloud).    Provide a consistent, predictable schedule. Post the schedule in the classroom and/or tape it to the inside of the " "desk or student assignment book.    Write down key words on the board to aid in note-taking during sections that are "lecture-based."    Break the Assignments into Short, Sequential Steps    Break instructions into short, sequential steps; dividing work into smaller short "mini-assignments," building reinforcement and opportunities for feedback at the end of each segment; handing out longer assignments insegments; and schedule shorter work periods.    Provide regular guidance and appropriate supervision on planning assignments, especially extended projects that take several days or weeks to complete.    Give private, discrete cues to student to stay on task, cue the student in advance before calling on him, and cuebefore an important point is about to be made (example: "This is a major point.").    Allow adequate time for student to answer questions to permit the student time to form a thoughtful answer.    Use high impact visual aids with lively oral presentations to provide a more interesting andnovel presentation of lessons.    Allow the student to begin an assignment and then go to the teacher after the first few problems are done for confirmation that he/she is doing the assignment properly, and to receive gentle correction or praise.      Make a second set of books and materials available for this student to keep a back-up set at home    Allow the student additional time to complete quizzes, tests, exams and other skill assessments when needed, including standardized tests.  .  Provide the student with other opportunities, methods or test formats to demonstrate what is known.    Allow the student to take tests or quizzes in a quiet place in order to reduce distractions.    Tests should always be typed (not handwritten) using large type; and all duplicated materials must be clear, dark and easy to read.     Provide the student with a regular program in study skills, test taking skills, organizational skills, " and time management skills.    Provide daily assistance/guidance to the student in how to use a planner on a daily basis and for long-term assignments; help the student plan how to break larger assignments into smaller, more manageable tasks.    Teach the student how to identify key words, phases, operations signs in math, and/or sentences in instructions and in general reading.    Teach the student how to scan a large text chapter for key information, and how to highlight important selections.    Teach the student efficient methods of proof-reading own work.    Look for positives. Provide immediate feedback to the student each time and every the student accomplishes desired behavior and/or achievement - no matter how small the accomplishment.    Provide clearly stated rules and consequences and expectations that are consistently carried out for all students.              2017    To Whom It May Concern:    Tova Garland was seen by me today for assessment and  meets the DSM-V criteria for the diagnosis of Attention Deficit Hyperactivity Disorder- Combined Type.  Tova Garland  would benefit from classroom accommodations stipulated by theComprehensive Services, and Developmental Disabilities Act of 1978, under the classification of OHI.    Please provide behavioral accommodations to assist her at school and during standardized tests.     The following  behavioral accommodations for the school environment may include the followin. Preferential classroom seating and other environmental accommodations to enhance attention and reduce distractions, but without isolation.  2. Establish nonverbal cues to regain attention  3. Homework accountability system. Teachers may want to check completion/accuracy of assignment notebook and book bag for inclusion of necessary materials  4. Extra set of textbooks for home  5. Extended time for class work, tests, and assignments  6. Simplify assignment  instructions  7. Ensure student understands written and verbal directions prior to beginning tests or class assignments  8. Supplement verbal instructions with visual instructions and vice versa    9. Apply all classroom accommodations to standardized tests    Sincerely yours,      Aleena Potter M.D., F.A.A.P.        Board Certified: Developmental-Behavioral Pediatrics        [facility]        [address] [place] [address]        [phone]   No

## 2021-07-02 NOTE — CONSULT NOTE ADULT - ASSESSMENT
78 year old man with a history of HTN, HLD, kidney stones, GERD, BPH, and possible dementia who presents with subacute dizziness vs vertigo.  Hospital course complicated with nausea and vomiting.  patient reports he feels nauseous and would throw up when he is dizzy and have vertigo.     Impression:  # Nausea and vomiting 2nd to vertigo    Recommendations:  - Supportive care with antiemetics as needed.   - treatment of vertigo as per primary team        Yusef Peter   Gastroenterology Fellow  Pager: [phone]  Please call answering service [phone] / on-call GI fellow after 5pm and before 8am, and on weekends. 78 year old man with a history of HTN, HLD, kidney stones, GERD, BPH, and possible dementia who presents with subacute dizziness vs vertigo.  Hospital course complicated with nausea and vomiting.  patient reports he feels nauseous and would throw up when he is dizzy and have vertigo.     Impression:  # Nausea and vomiting 2nd to vertigo    Recommendations:  - Supportive care with antiemetics as needed.   - Treatment of vertigo as per primary team    - No further work up as per GI      Yusef Peter   Gastroenterology Fellow  Pager: [phone]  Please call answering service [phone] / on-call GI fellow after 5pm and before 8am, and on weekends. 80M with HTN, HLD, CVA with residual ataxia, GERD, vertigo who presented with weakness, nausea and vomiting. GI was consulted for ongoing nausea and vomiting.   Hospital course complicated with nausea and vomiting.  patient reports he feels nauseous and would throw up when he is dizzy and have vertigo.     Impression:  # Nausea and vomiting 2nd to vertigo    Recommendations:  - Supportive care with antiemetics as needed.   - Treatment of vertigo as per primary team    - No further work up as per GI      Yusef Peter   Gastroenterology Fellow  Pager: [phone]  Please call answering service [phone] / on-call GI fellow after 5pm and before 8am, and on weekends. 80M with HTN, HLD, CVA with residual ataxia, GERD, vertigo who presented with weakness, nausea and vomiting. GI was consulted for ongoing nausea and vomiting.   Hospital course complicated with nausea and vomiting.    Impression:  # Nausea and vomiting: Suspect likely secondary to intermittent dizziness and/or vertigo. Lower suspicion for primary GI etiology though possible self-limited gastroenteritis given reports of recent diarrhea.   # Dark emesis: Low suspicion for clinically significant bleeding. May have dark flecks of old blood from ongoing nausea/retching. Hgb currently stable.    Recommendations:  - Continue PPI  - Supportive care with antiemetics as needed.   - Treatment of vertigo as per primary team    - No further work up as per GI  - Can follow up with Dr. Richar Longoria as outpatient on routine basis    Yusef Peter   Gastroenterology Fellow  Pager: [phone]  Please call answering service [phone] / on-call GI fellow after 5pm and before 8am, and on weekends.

## 2021-07-03 LAB
ALBUMIN SERPL ELPH-MCNC: 3.1 G/DL — LOW (ref 3.3–5)
ALP SERPL-CCNC: 67 U/L — SIGNIFICANT CHANGE UP (ref 40–120)
ALT FLD-CCNC: 12 U/L — SIGNIFICANT CHANGE UP (ref 4–41)
ANION GAP SERPL CALC-SCNC: 10 MMOL/L — SIGNIFICANT CHANGE UP (ref 7–14)
AST SERPL-CCNC: 13 U/L — SIGNIFICANT CHANGE UP (ref 4–40)
BASOPHILS # BLD AUTO: 0 K/UL — SIGNIFICANT CHANGE UP (ref 0–0.2)
BASOPHILS NFR BLD AUTO: 0 % — SIGNIFICANT CHANGE UP (ref 0–2)
BILIRUB SERPL-MCNC: 1 MG/DL — SIGNIFICANT CHANGE UP (ref 0.2–1.2)
BUN SERPL-MCNC: 17 MG/DL — SIGNIFICANT CHANGE UP (ref 7–23)
CALCIUM SERPL-MCNC: 8.2 MG/DL — LOW (ref 8.4–10.5)
CHLORIDE SERPL-SCNC: 105 MMOL/L — SIGNIFICANT CHANGE UP (ref 98–107)
CO2 SERPL-SCNC: 24 MMOL/L — SIGNIFICANT CHANGE UP (ref 22–31)
CREAT SERPL-MCNC: 0.8 MG/DL — SIGNIFICANT CHANGE UP (ref 0.5–1.3)
EOSINOPHIL # BLD AUTO: 0 K/UL — SIGNIFICANT CHANGE UP (ref 0–0.5)
EOSINOPHIL NFR BLD AUTO: 0 % — SIGNIFICANT CHANGE UP (ref 0–6)
GLUCOSE SERPL-MCNC: 105 MG/DL — HIGH (ref 70–99)
HCT VFR BLD CALC: 40.1 % — SIGNIFICANT CHANGE UP (ref 39–50)
HGB BLD-MCNC: 12.7 G/DL — LOW (ref 13–17)
IANC: 14.6 K/UL — HIGH (ref 1.5–8.5)
LYMPHOCYTES # BLD AUTO: 0.7 K/UL — LOW (ref 1–3.3)
LYMPHOCYTES # BLD AUTO: 4 % — LOW (ref 13–44)
MAGNESIUM SERPL-MCNC: 1.9 MG/DL — SIGNIFICANT CHANGE UP (ref 1.6–2.6)
MANUAL SMEAR VERIFICATION: SIGNIFICANT CHANGE UP
MCHC RBC-ENTMCNC: 29.2 PG — SIGNIFICANT CHANGE UP (ref 27–34)
MCHC RBC-ENTMCNC: 31.7 GM/DL — LOW (ref 32–36)
MCV RBC AUTO: 92.2 FL — SIGNIFICANT CHANGE UP (ref 80–100)
MONOCYTES # BLD AUTO: 1.92 K/UL — HIGH (ref 0–0.9)
MONOCYTES NFR BLD AUTO: 11 % — SIGNIFICANT CHANGE UP (ref 2–14)
MYELOCYTES NFR BLD: 1 % — HIGH (ref 0–0)
NEUTROPHILS # BLD AUTO: 14.65 K/UL — HIGH (ref 1.8–7.4)
NEUTROPHILS NFR BLD AUTO: 83 % — HIGH (ref 43–77)
NEUTS BAND # BLD: 1 % — SIGNIFICANT CHANGE UP (ref 0–6)
NRBC # BLD: 0 /100 — SIGNIFICANT CHANGE UP (ref 0–0)
PHOSPHATE SERPL-MCNC: 2.2 MG/DL — LOW (ref 2.5–4.5)
PLAT MORPH BLD: SIGNIFICANT CHANGE UP
PLATELET # BLD AUTO: 242 K/UL — SIGNIFICANT CHANGE UP (ref 150–400)
PLATELET COUNT - ESTIMATE: NORMAL — SIGNIFICANT CHANGE UP
POTASSIUM SERPL-MCNC: 4 MMOL/L — SIGNIFICANT CHANGE UP (ref 3.5–5.3)
POTASSIUM SERPL-SCNC: 4 MMOL/L — SIGNIFICANT CHANGE UP (ref 3.5–5.3)
PROT SERPL-MCNC: 6 G/DL — SIGNIFICANT CHANGE UP (ref 6–8.3)
RBC # BLD: 4.35 M/UL — SIGNIFICANT CHANGE UP (ref 4.2–5.8)
RBC # FLD: 13.4 % — SIGNIFICANT CHANGE UP (ref 10.3–14.5)
RBC BLD AUTO: NORMAL — SIGNIFICANT CHANGE UP
SODIUM SERPL-SCNC: 139 MMOL/L — SIGNIFICANT CHANGE UP (ref 135–145)
WBC # BLD: 17.44 K/UL — HIGH (ref 3.8–10.5)
WBC # FLD AUTO: 17.44 K/UL — HIGH (ref 3.8–10.5)

## 2021-07-03 PROCEDURE — 99222 1ST HOSP IP/OBS MODERATE 55: CPT | Mod: GC

## 2021-07-03 PROCEDURE — 71045 X-RAY EXAM CHEST 1 VIEW: CPT | Mod: 26

## 2021-07-03 RX ADMIN — SODIUM CHLORIDE 75 MILLILITER(S): 9 INJECTION INTRAMUSCULAR; INTRAVENOUS; SUBCUTANEOUS at 21:26

## 2021-07-03 RX ADMIN — TAMSULOSIN HYDROCHLORIDE 0.4 MILLIGRAM(S): 0.4 CAPSULE ORAL at 21:25

## 2021-07-03 RX ADMIN — Medication 1 TABLET(S): at 17:23

## 2021-07-03 RX ADMIN — ENOXAPARIN SODIUM 40 MILLIGRAM(S): 100 INJECTION SUBCUTANEOUS at 17:23

## 2021-07-03 RX ADMIN — FINASTERIDE 5 MILLIGRAM(S): 5 TABLET, FILM COATED ORAL at 11:48

## 2021-07-03 RX ADMIN — Medication 1000 UNIT(S): at 11:49

## 2021-07-03 RX ADMIN — Medication 81 MILLIGRAM(S): at 11:49

## 2021-07-03 RX ADMIN — Medication 1 TABLET(S): at 05:55

## 2021-07-03 RX ADMIN — LISINOPRIL 20 MILLIGRAM(S): 2.5 TABLET ORAL at 05:55

## 2021-07-03 RX ADMIN — Medication 90 MILLIGRAM(S): at 05:55

## 2021-07-03 RX ADMIN — ATORVASTATIN CALCIUM 40 MILLIGRAM(S): 80 TABLET, FILM COATED ORAL at 21:25

## 2021-07-03 NOTE — CONSULT NOTE ADULT - ATTENDING COMMENTS
80M with htn, chol, CVA (ataxia residual sx), vertigo p/w generalized weakness, nausea, vomiting and diarrhea x2 days.  No fever.  Still having symptoms and now new leukocytosis.  Per wife, coughs while he eats as well.    Leukocytosis  - would check blood cultures  - please obtain CXR PA/lateral  - hold antibiotics at this time    N/v/d  - please check GI PCR   - check Cdiff    Please call the ID service [phone] with questions or concerns over the weekend
pt seen and examined agree with plan above
Patient with nausea and vomiting in setting of vertigo/dizziness. Patient denies GI symptoms when dizziness is controlled.  Management of dizziness per primary team. Continue supportive care for nausea/vomiting with PPI and anti-emetics.

## 2021-07-03 NOTE — PROGRESS NOTE ADULT - ASSESSMENT
78 year old man with a history of HTN, HLD, kidney stones, GERD, BPH, and possible dementia who presents with subacute dizziness vs vertigo.  He reports he was in his usual state of health until about 6-7 weeks ago when developed intermittent short lived dizziness.  He reports his symptoms would come on occasionally with walking 1-2 blocks and possibly had a room spinning sensation.  Would occur maybe a few times a day and would last a few minutes before resolving spontaneously.  No associated nausea, vomitting, muscle aches, chest pain, palpitations, fevers, chills, blurry vision, ear ringing, ear fullness.  He does note a few episodes of diarrhea and a headache in the last few days.  He has not had any falls or trauma associated.  Due to these symptoms he presented to [facility] ED.  In the ED, he was afebrile with unremarkable vital signs.  Labwork remarkable for AG 15, Ca 8.0, AST 41, downtrending trops from 39 to 30, elevated lactate 2.6, elevated , and negative CT angio head/neck and CT a/p, unremarkable CXR He was given NS 1 L, meclizine 50, reglan 10 IV and admitted for further evaluation.         Problem/Plan - 1:  ·  Problem: Vertigo.  Plan: -Resolved. .   -TTE-nl outpt  check orthostatic BP.CONT IVF.Cardiology consult appreciated.     Problem/Plan - 2:  ·  Problem: Essential Hypertension. -controlled.c/w Lisinopril,Nifedepine   Problem/Plan - 3:  ·  Problem: Mild cognitive impairment.  Plan:  Doing well. .      Problem/Plan - 4:  ·  Problem: Benign prostatic hyperplasia without lower urinary tract symptoms.  Plan: On dutasteride and Silodosin as outpatient.  Will hold silodosin as frequently causes some orthostatic hypotension  Continue dutasteride.      Problem/Plan - 5:  ·  Problem: Hyperlipidemia, unspecified hyperlipidemia type.  Plan: Statin      Problem/Plan - 6:  Problem: Hypocalcemia.,Hypomagnesemia,Hypophosphatemia-supplement Elevated intact PTH-hYPERPARATHYROIDISM?.  h/o Nephrolithiasis-Endo . F/U     Problem/Plan - 7:  ·  Problem: Need for prophylactic measure.  Plan: Improve score 1 for age.  SCD for DVT prophylaxis

## 2021-07-03 NOTE — CONSULT NOTE ADULT - REASON FOR ADMISSION
electrolytes disturbance, generalized weakness

## 2021-07-03 NOTE — CONSULT NOTE ADULT - SUBJECTIVE AND OBJECTIVE BOX
Rosalba Stokes - [id number]    Patient is a 80y old  Male who presents with a chief complaint of electrolytes disturbance, generalized weakness (02 Jul 2021 15:50)    HPI:  80M with htn, chol, CVA (ataxia as residual,) and Vertigo p/w generalized weakness, nausea, vomiting and diarrhea x2 days.  Last evening he was at his daughter's house where he ate chicken for dinner. Then last night developed nausea, vomiting and diarrhea x 2 episodes and this morning 2 episodes as well. Emesis was bilious and diarrhea described as water, non-foul smelling. Furthermore, pt developed generalized weakness, dizziness and lightheadedness. EMS was called and pt taken to [facility] ED. Pt denies fever, chills, chest pain, sob, palpitations, diaphoresis, abdominal pain, headache, syncope or sick contacts.    In ED pt found to be hypocalcemic and hypomagnesemic.  He was given IV NS x1L, Mg Sulfate IV 2g and IV Ca Gluconate 2g where his symptoms improved. In general pt is poor historian, doesn't know his home meds. Med Rec pharmacist asked to assist with home med rec. Multiple attempts made to reach his wife, Adwoa at [phone] without success. (28 Jun 2021 17:06)     ID asked to help management    prior hospital charts reviewed [  ]  primary team notes reviewed [  ]  other consultant notes reviewed [  ]    PAST MEDICAL & SURGICAL HISTORY:  CVA (Cerebral Infarction)  Ataxia as residual  Secondary Seizure Disorder  PU (Peptic Ulcer)  Essential Hypertension  Hyperlipidemia  Vertigo  Status Post Inguinal Hernia Repair  Nephrolithiasis s/p URS stone extraction 2012    Allergies  No Known Allergies    ANTIMICROBIALS:      MEDICATIONS  (STANDING):  aspirin enteric coated 81 daily  atorvastatin 40 at bedtime  enoxaparin Injectable 40 every 24 hours  finasteride 5 daily  lisinopril 20 daily  NIFEdipine XL 90 daily  tamsulosin 0.4 at bedtime    SOCIAL HISTORY:       FAMILY HISTORY:  Acute myocardial infarction    REVIEW OF SYSTEMS  [  ] ROS unobtainable because:    [  ] All other systems negative except as noted below:	    Constitutional:  [ ] fever [ ] chills  [ ] weight loss  [ ] weakness  Skin:  [ ] rash [ ] phlebitis	  Eyes: [ ] icterus [ ] pain  [ ] discharge	  ENMT: [ ] sore throat  [ ] thrush [ ] ulcers [ ] exudates  Respiratory: [ ] dyspnea [ ] hemoptysis [ ] cough [ ] sputum	  Cardiovascular:  [ ] chest pain [ ] palpitations [ ] edema	  Gastrointestinal:  [ ] nausea [ ] vomiting [ ] diarrhea [ ] constipation [ ] pain	  Genitourinary:  [ ] dysuria [ ] frequency [ ] hematuria [ ] discharge [ ] flank pain  [ ] incontinence  Musculoskeletal:  [ ] myalgias [ ] arthralgias [ ] arthritis  [ ] back pain  Neurological:  [ ] headache [ ] seizures  [ ] confusion/altered mental status  Psychiatric:  [ ] anxiety [ ] depression	  Hematology/Lymphatics:  [ ] lymphadenopathy  Endocrine:  [ ] adrenal [ ] thyroid  Allergic/Immunologic:	 [ ] transplant [ ] seasonal    Vital Signs Last 24 Hrs  T(F): 98.2 (07-03-21 @ 09:57), Max: 98.8 (06-29-21 @ 21:40)  Vital Signs Last 24 Hrs  HR: 86 (07-03-21 @ 09:57) (86 - 95)  BP: 131/82 (07-03-21 @ 09:57) (110/73 - 131/82)  RR: 19 (07-03-21 @ 09:57)  SpO2: 91% (07-03-21 @ 09:57) (91% - 95%)  Wt(kg): --    PHYSICAL EXAM:                            12.7   17.44 )-----------( 242      ( 03 Jul 2021 07:25 )             40.1     139  |  105  |  17  ----------------------------<  105<H>  4.0   |  24  |  0.80    Ca    8.2<L>      03 Jul 2021 07:25  Phos  2.2     07-03  Mg     1.90     07-03    TPro  6.0  /  Alb  3.1<L>  /  TBili  1.0  /  DBili  x   /  AST  13  /  ALT  12  /  AlkPhos  67  07-03    WBC Count: 17.44 (07-03-21 @ 07:25)  WBC Count: 12.43 (07-02-21 @ 07:30)  WBC Count: 10.37 (07-01-21 @ 07:43)  WBC Count: 10.05 (06-30-21 @ 06:54)  WBC Count: 11.97 (06-29-21 @ 06:22)  WBC Count: 9.16 (06-28-21 @ 12:22)    Creatinine, Serum: 0.80 (07-03)  Creatinine, Serum: 0.73 (07-02)  Creatinine, Serum: 0.69 (07-01)  Creatinine, Serum: 0.83 (06-30)  Creatinine, Serum: 1.03 (06-29)  Creatinine, Serum: 1.04 (06-29)  Creatinine, Serum: 0.96 (06-28)  Creatinine, Serum: 0.97 (06-28)  Creatinine, Serum: 0.95 (06-28)    MICROBIOLOGY:  none    RADIOLOGY:  imaging below personally reviewed and agree with findings    CT Head No Cont (07.02.21 @ 21:26) >  IMPRESSION:  No mass effect, hemorrhage or evidence of acute intracranial pathology.    Xray Abdomen 2 Views (07.02.21 @ 12:22) >  IMPRESSION:  Nonobstructive gas pattern.     Rosalba Stokes - [phone]    Patient is a 80y old  Male who presents with a chief complaint of electrolytes disturbance, generalized weakness (02 Jul 2021 15:50)    HPI:  80M with htn, chol, CVA (ataxia as residual,) and Vertigo p/w generalized weakness, nausea, vomiting and diarrhea x2 days.  Last evening he was at his daughter's house where he ate chicken for dinner. Then last night developed nausea, vomiting and diarrhea x 2 episodes and this morning 2 episodes as well. Emesis was bilious and diarrhea described as water, non-foul smelling. Furthermore, pt developed generalized weakness, dizziness and lightheadedness. EMS was called and pt taken to [facility] ED. Pt denies fever, chills, chest pain, sob, palpitations, diaphoresis, abdominal pain, headache, syncope or sick contacts.    In ED pt found to be hypocalcemic and hypomagnesemic.  He was given IV NS x1L, Mg Sulfate IV 2g and IV Ca Gluconate 2g where his symptoms improved. In general pt is poor historian, doesn't know his home meds. Med Rec pharmacist asked to assist with home med rec. Multiple attempts made to reach his wife, Adwoa at [phone] without success. (28 Jun 2021 17:06)     ID asked to help management    prior hospital charts reviewed [  ]  primary team notes reviewed [ x ]  other consultant notes reviewed [ x ]    PAST MEDICAL & SURGICAL HISTORY:  CVA (Cerebral Infarction)  Ataxia as residual  Secondary Seizure Disorder  PU (Peptic Ulcer)  Essential Hypertension  Hyperlipidemia  Vertigo  Status Post Inguinal Hernia Repair  Nephrolithiasis s/p URS stone extraction 2012    Allergies  No Known Allergies    ANTIMICROBIALS:  none    MEDICATIONS  (STANDING):  aspirin enteric coated 81 daily  atorvastatin 40 at bedtime  enoxaparin Injectable 40 every 24 hours  finasteride 5 daily  lisinopril 20 daily  NIFEdipine XL 90 daily  tamsulosin 0.4 at bedtime    SOCIAL HISTORY:       FAMILY HISTORY:  Acute myocardial infarction    REVIEW OF SYSTEMS  [  ] ROS unobtainable because:    [  ] All other systems negative except as noted below:	    Constitutional:  [ ] fever [ ] chills  [ ] weight loss  [ ] weakness  Skin:  [ ] rash [ ] phlebitis	  Eyes: [ ] icterus [ ] pain  [ ] discharge	  ENMT: [ ] sore throat  [ ] thrush [ ] ulcers [ ] exudates  Respiratory: [ ] dyspnea [ ] hemoptysis [ ] cough [ ] sputum	  Cardiovascular:  [ ] chest pain [ ] palpitations [ ] edema	  Gastrointestinal:  [ ] nausea [ ] vomiting [ ] diarrhea [ ] constipation [ ] pain	  Genitourinary:  [ ] dysuria [ ] frequency [ ] hematuria [ ] discharge [ ] flank pain  [ ] incontinence  Musculoskeletal:  [ ] myalgias [ ] arthralgias [ ] arthritis  [ ] back pain  Neurological:  [ ] headache [ ] seizures  [ ] confusion/altered mental status  Psychiatric:  [ ] anxiety [ ] depression	  Hematology/Lymphatics:  [ ] lymphadenopathy  Endocrine:  [ ] adrenal [ ] thyroid  Allergic/Immunologic:	 [ ] transplant [ ] seasonal    Vital Signs Last 24 Hrs  T(F): 98.2 (07-03-21 @ 09:57), Max: 98.8 (06-29-21 @ 21:40)  Vital Signs Last 24 Hrs  HR: 86 (07-03-21 @ 09:57) (86 - 95)  BP: 131/82 (07-03-21 @ 09:57) (110/73 - 131/82)  RR: 19 (07-03-21 @ 09:57)  SpO2: 91% (07-03-21 @ 09:57) (91% - 95%)  Wt(kg): --    PHYSICAL EXAM:                            12.7   17.44 )-----------( 242      ( 03 Jul 2021 07:25 )             40.1     139  |  105  |  17  ----------------------------<  105<H>  4.0   |  24  |  0.80    Ca    8.2<L>      03 Jul 2021 07:25  Phos  2.2     07-03  Mg     1.90     07-03    TPro  6.0  /  Alb  3.1<L>  /  TBili  1.0  /  DBili  x   /  AST  13  /  ALT  12  /  AlkPhos  67  07-03    WBC Count: 17.44 (07-03-21 @ 07:25)  WBC Count: 12.43 (07-02-21 @ 07:30)  WBC Count: 10.37 (07-01-21 @ 07:43)  WBC Count: 10.05 (06-30-21 @ 06:54)  WBC Count: 11.97 (06-29-21 @ 06:22)  WBC Count: 9.16 (06-28-21 @ 12:22)    Creatinine, Serum: 0.80 (07-03)  Creatinine, Serum: 0.73 (07-02)  Creatinine, Serum: 0.69 (07-01)  Creatinine, Serum: 0.83 (06-30)  Creatinine, Serum: 1.03 (06-29)  Creatinine, Serum: 1.04 (06-29)  Creatinine, Serum: 0.96 (06-28)  Creatinine, Serum: 0.97 (06-28)  Creatinine, Serum: 0.95 (06-28)    MICROBIOLOGY:  none    RADIOLOGY:  imaging below personally reviewed and agree with findings    CT Head No Cont (07.02.21 @ 21:26) >  IMPRESSION:  No mass effect, hemorrhage or evidence of acute intracranial pathology.    Xray Abdomen 2 Views (07.02.21 @ 12:22) >  IMPRESSION:  Nonobstructive gas pattern.     Rosalba Stokes - [phone]    Patient is a 80y old  Male who presents with a chief complaint of electrolytes disturbance, generalized weakness (02 Jul 2021 15:50)    HPI:  80M with htn, chol, CVA (ataxia as residual,) and Vertigo p/w generalized weakness, nausea, vomiting and diarrhea x2 days.  Last evening he was at his daughter's house where he ate chicken for dinner. Then last night developed nausea, vomiting and diarrhea x 2 episodes and this morning 2 episodes as well. Emesis was bilious and diarrhea described as water, non-foul smelling. Furthermore, pt developed generalized weakness, dizziness and lightheadedness. EMS was called and pt taken to [facility] ED. Pt denies fever, chills, chest pain, sob, palpitations, diaphoresis, abdominal pain, headache, syncope or sick contacts.    In ED pt found to be hypocalcemic and hypomagnesemic.  He was given IV NS x1L, Mg Sulfate IV 2g and IV Ca Gluconate 2g where his symptoms improved. In general pt is poor historian, doesn't know his home meds. Med Rec pharmacist asked to assist with home med rec. Multiple attempts made to reach his wife, Adwoa at [phone] without success. (28 Jun 2021 17:06)     ID asked to help management    prior hospital charts reviewed [  ]  primary team notes reviewed [ x ]  other consultant notes reviewed [ x ]    PAST MEDICAL & SURGICAL HISTORY:  CVA (Cerebral Infarction)  Ataxia as residual  Secondary Seizure Disorder  PU (Peptic Ulcer)  Essential Hypertension  Hyperlipidemia  Vertigo  Status Post Inguinal Hernia Repair  Nephrolithiasis s/p URS stone extraction 2012    Allergies  No Known Allergies    ANTIMICROBIALS:  none    MEDICATIONS  (STANDING):  aspirin enteric coated 81 daily  atorvastatin 40 at bedtime  enoxaparin Injectable 40 every 24 hours  finasteride 5 daily  lisinopril 20 daily  NIFEdipine XL 90 daily  tamsulosin 0.4 at bedtime    SOCIAL HISTORY:   Lives with wife. Born in [facility]. In [place] x 60 years    FAMILY HISTORY:  mother with skin cancer    REVIEW OF SYSTEMS  [  ] ROS unobtainable because:    [x  ] All other systems negative except as noted below:	    Constitutional:  [ ] fever [ ] chills  [ ] weight loss  [ ] weakness  Skin:  [ ] rash [ ] phlebitis	  Eyes: [ ] icterus [ ] pain  [ ] discharge	  ENMT: [ ] sore throat  [ ] thrush [ ] ulcers [ ] exudates  Respiratory: [ ] dyspnea [ ] hemoptysis [ ] cough [ ] sputum	  Cardiovascular:  [ ] chest pain [ ] palpitations [ ] edema	  Gastrointestinal:  [x ] nausea [ x] vomiting [ x] diarrhea [ ] constipation [ ] pain	  Genitourinary:  [ ] dysuria [ ] frequency [ ] hematuria [ ] discharge [ ] flank pain  [ ] incontinence  Musculoskeletal:  [ ] myalgias [ ] arthralgias [ ] arthritis  [ ] back pain  Neurological:  [ ] headache [ ] seizures  [ ] confusion/altered mental status  Psychiatric:  [ ] anxiety [ ] depression	  Hematology/Lymphatics:  [ ] lymphadenopathy  Endocrine:  [ ] adrenal [ ] thyroid  Allergic/Immunologic:	 [ ] transplant [ ] seasonal    Vital Signs Last 24 Hrs  T(F): 98.2 (07-03-21 @ 09:57), Max: 98.8 (06-29-21 @ 21:40)  Vital Signs Last 24 Hrs  HR: 86 (07-03-21 @ 09:57) (86 - 95)  BP: 131/82 (07-03-21 @ 09:57) (110/73 - 131/82)  RR: 19 (07-03-21 @ 09:57)  SpO2: 91% (07-03-21 @ 09:57) (91% - 95%)  Wt(kg): --    PHYSICAL EXAM:  Constitutional: non-toxic, no distress  HEAD/EYES: anicteric, no conjunctival injection  ENT:  supple, no thrush  Cardiovascular:   normal S1, S2, no murmur, no edema  Respiratory:  clear BS bilaterally, no wheezes, no rales  GI:  soft, non-tender, normal bowel sounds  :  no dennis, no CVA tenderness  Musculoskeletal:  no synovitis, normal ROM  Neurologic: awake and alert, normal strength, no focal findings  Skin:  no rash, no erythema, no phlebitis  Heme/Onc: no lymphadenopathy   Psychiatric:  awake, alert, appropriate mood                          12.7   17.44 )-----------( 242      ( 03 Jul 2021 07:25 )             40.1     139  |  105  |  17  ----------------------------<  105<H>  4.0   |  24  |  0.80    Ca    8.2<L>      03 Jul 2021 07:25  Phos  2.2     07-03  Mg     1.90     07-03    TPro  6.0  /  Alb  3.1<L>  /  TBili  1.0  /  DBili  x   /  AST  13  /  ALT  12  /  AlkPhos  67  07-03    WBC Count: 17.44 (07-03-21 @ 07:25)  WBC Count: 12.43 (07-02-21 @ 07:30)  WBC Count: 10.37 (07-01-21 @ 07:43)  WBC Count: 10.05 (06-30-21 @ 06:54)  WBC Count: 11.97 (06-29-21 @ 06:22)  WBC Count: 9.16 (06-28-21 @ 12:22)    Creatinine, Serum: 0.80 (07-03)  Creatinine, Serum: 0.73 (07-02)  Creatinine, Serum: 0.69 (07-01)  Creatinine, Serum: 0.83 (06-30)  Creatinine, Serum: 1.03 (06-29)  Creatinine, Serum: 1.04 (06-29)  Creatinine, Serum: 0.96 (06-28)  Creatinine, Serum: 0.97 (06-28)  Creatinine, Serum: 0.95 (06-28)    MICROBIOLOGY:  none    RADIOLOGY:  imaging below personally reviewed and agree with findings    CT Head No Cont (07.02.21 @ 21:26) >  IMPRESSION:  No mass effect, hemorrhage or evidence of acute intracranial pathology.    Xray Abdomen 2 Views (07.02.21 @ 12:22) >  IMPRESSION:  Nonobstructive gas pattern.

## 2021-07-03 NOTE — CONSULT NOTE ADULT - ASSESSMENT
80M with htn, chol, CVA (ataxia as residual,) and Vertigo p/w generalized weakness, nausea, vomiting and diarrhea x2 days.  Now with worsening leukocytosis.    #Leukocytosis, cough, diarrhea - No complaints but actively coughing with food on interview. Wife reports still with emesis as of last night and loose stools.  GI eval noted. Abd XR without obstruction and reports passing gas.  Concern for asp pna vs gastroenteritis    Rec:  - trend leukocytosis  - check GI PCR, cdiff, and CXR  - monitor off abx  - check bl clx     Dawson Rodriguez MD  Fellow, Infectious Diseases, PGY-4  Pager: [phone]  Before 9am or after 5pm/Weekends: Call [phone]

## 2021-07-04 LAB
ALBUMIN SERPL ELPH-MCNC: 2.7 G/DL — LOW (ref 3.3–5)
ALP SERPL-CCNC: 62 U/L — SIGNIFICANT CHANGE UP (ref 40–120)
ALT FLD-CCNC: 17 U/L — SIGNIFICANT CHANGE UP (ref 4–41)
ANION GAP SERPL CALC-SCNC: 10 MMOL/L — SIGNIFICANT CHANGE UP (ref 7–14)
AST SERPL-CCNC: 17 U/L — SIGNIFICANT CHANGE UP (ref 4–40)
BASOPHILS # BLD AUTO: 0.04 K/UL — SIGNIFICANT CHANGE UP (ref 0–0.2)
BASOPHILS NFR BLD AUTO: 0.3 % — SIGNIFICANT CHANGE UP (ref 0–2)
BILIRUB SERPL-MCNC: 0.5 MG/DL — SIGNIFICANT CHANGE UP (ref 0.2–1.2)
BUN SERPL-MCNC: 18 MG/DL — SIGNIFICANT CHANGE UP (ref 7–23)
CALCIUM SERPL-MCNC: 8.3 MG/DL — LOW (ref 8.4–10.5)
CHLORIDE SERPL-SCNC: 103 MMOL/L — SIGNIFICANT CHANGE UP (ref 98–107)
CO2 SERPL-SCNC: 24 MMOL/L — SIGNIFICANT CHANGE UP (ref 22–31)
CREAT SERPL-MCNC: 0.78 MG/DL — SIGNIFICANT CHANGE UP (ref 0.5–1.3)
EOSINOPHIL # BLD AUTO: 0.3 K/UL — SIGNIFICANT CHANGE UP (ref 0–0.5)
EOSINOPHIL NFR BLD AUTO: 2.5 % — SIGNIFICANT CHANGE UP (ref 0–6)
GLUCOSE SERPL-MCNC: 97 MG/DL — SIGNIFICANT CHANGE UP (ref 70–99)
HCT VFR BLD CALC: 37.1 % — LOW (ref 39–50)
HGB BLD-MCNC: 11.5 G/DL — LOW (ref 13–17)
IANC: 9.36 K/UL — HIGH (ref 1.5–8.5)
IMM GRANULOCYTES NFR BLD AUTO: 0.4 % — SIGNIFICANT CHANGE UP (ref 0–1.5)
LYMPHOCYTES # BLD AUTO: 1.4 K/UL — SIGNIFICANT CHANGE UP (ref 1–3.3)
LYMPHOCYTES # BLD AUTO: 11.5 % — LOW (ref 13–44)
MAGNESIUM SERPL-MCNC: 1.8 MG/DL — SIGNIFICANT CHANGE UP (ref 1.6–2.6)
MCHC RBC-ENTMCNC: 29 PG — SIGNIFICANT CHANGE UP (ref 27–34)
MCHC RBC-ENTMCNC: 31 GM/DL — LOW (ref 32–36)
MCV RBC AUTO: 93.5 FL — SIGNIFICANT CHANGE UP (ref 80–100)
MONOCYTES # BLD AUTO: 1.01 K/UL — HIGH (ref 0–0.9)
MONOCYTES NFR BLD AUTO: 8.3 % — SIGNIFICANT CHANGE UP (ref 2–14)
NEUTROPHILS # BLD AUTO: 9.36 K/UL — HIGH (ref 1.8–7.4)
NEUTROPHILS NFR BLD AUTO: 77 % — SIGNIFICANT CHANGE UP (ref 43–77)
NRBC # BLD: 0 /100 WBCS — SIGNIFICANT CHANGE UP
NRBC # FLD: 0 K/UL — SIGNIFICANT CHANGE UP
PHOSPHATE SERPL-MCNC: 2.5 MG/DL — SIGNIFICANT CHANGE UP (ref 2.5–4.5)
PLATELET # BLD AUTO: 229 K/UL — SIGNIFICANT CHANGE UP (ref 150–400)
POTASSIUM SERPL-MCNC: 4 MMOL/L — SIGNIFICANT CHANGE UP (ref 3.5–5.3)
POTASSIUM SERPL-SCNC: 4 MMOL/L — SIGNIFICANT CHANGE UP (ref 3.5–5.3)
PROT SERPL-MCNC: 5.7 G/DL — LOW (ref 6–8.3)
RBC # BLD: 3.97 M/UL — LOW (ref 4.2–5.8)
RBC # FLD: 13.3 % — SIGNIFICANT CHANGE UP (ref 10.3–14.5)
SODIUM SERPL-SCNC: 137 MMOL/L — SIGNIFICANT CHANGE UP (ref 135–145)
WBC # BLD: 12.16 K/UL — HIGH (ref 3.8–10.5)
WBC # FLD AUTO: 12.16 K/UL — HIGH (ref 3.8–10.5)

## 2021-07-04 PROCEDURE — 99232 SBSQ HOSP IP/OBS MODERATE 35: CPT

## 2021-07-04 RX ORDER — SENNA PLUS 8.6 MG/1
2 TABLET ORAL AT BEDTIME
Refills: 0 | Status: DISCONTINUED | OUTPATIENT
Start: 2021-07-04 | End: 2021-07-06

## 2021-07-04 RX ADMIN — ATORVASTATIN CALCIUM 40 MILLIGRAM(S): 80 TABLET, FILM COATED ORAL at 21:50

## 2021-07-04 RX ADMIN — SENNA PLUS 2 TABLET(S): 8.6 TABLET ORAL at 21:50

## 2021-07-04 RX ADMIN — SODIUM CHLORIDE 75 MILLILITER(S): 9 INJECTION INTRAMUSCULAR; INTRAVENOUS; SUBCUTANEOUS at 11:24

## 2021-07-04 RX ADMIN — Medication 81 MILLIGRAM(S): at 11:25

## 2021-07-04 RX ADMIN — Medication 1 TABLET(S): at 16:22

## 2021-07-04 RX ADMIN — LISINOPRIL 20 MILLIGRAM(S): 2.5 TABLET ORAL at 05:24

## 2021-07-04 RX ADMIN — ENOXAPARIN SODIUM 40 MILLIGRAM(S): 100 INJECTION SUBCUTANEOUS at 16:21

## 2021-07-04 RX ADMIN — Medication 1 TABLET(S): at 05:24

## 2021-07-04 RX ADMIN — Medication 1000 UNIT(S): at 11:25

## 2021-07-04 RX ADMIN — TAMSULOSIN HYDROCHLORIDE 0.4 MILLIGRAM(S): 0.4 CAPSULE ORAL at 21:50

## 2021-07-04 RX ADMIN — FINASTERIDE 5 MILLIGRAM(S): 5 TABLET, FILM COATED ORAL at 11:25

## 2021-07-04 RX ADMIN — Medication 90 MILLIGRAM(S): at 05:24

## 2021-07-04 NOTE — PROGRESS NOTE ADULT - ASSESSMENT
80M with htn, chol, CVA (ataxia residual sx), vertigo p/w generalized weakness, nausea, vomiting and diarrhea x2 days.  No fever.  Still having symptoms and now new leukocytosis.  Per wife, coughs while he eats as well.  No diarrhea to suggest Cdiff.  CXR negative for pneumonia.  Leukocytosis spontaneously resolving without intervention    Leukocytosis  - better  - f/u BC  - monitor off antibiotics    N/v/d  - all resolved    Please call Infectious Diseases if there is a change in status.  Thank you.  [phone].

## 2021-07-04 NOTE — PROGRESS NOTE ADULT - ASSESSMENT
80M with htn, chol, CVA (ataxia as residual,) and Vertigo p/w generalized weakness, nausea, vomiting and diarrhea x2 days.  With increasing leukocytosis, improving today.    #Leukocytosis - possibly reactive to acute gastroenteritis from admission. No focal symptoms but with constipation now.  CXR with atlectasis.  Remains non toxic appearing and leukocytosis improving without abx.     Rec:  - trend leukocytosis  - monitor off abx  - f/u bl clx     Dawson Rodriguez MD  Fellow, Infectious Diseases, PGY-4  Pager: [phone]  Before 9am or after 5pm/Weekends: Call [phone]

## 2021-07-04 NOTE — PROGRESS NOTE ADULT - ASSESSMENT
78 year old man with a history of HTN, HLD, kidney stones, GERD, BPH, and possible dementia who presents with subacute dizziness vs vertigo.  He reports he was in his usual state of health until about 6-7 weeks ago when developed intermittent short lived dizziness.  He reports his symptoms would come on occasionally with walking 1-2 blocks and possibly had a room spinning sensation.  Would occur maybe a few times a day and would last a few minutes before resolving spontaneously.  No associated nausea, vomitting, muscle aches, chest pain, palpitations, fevers, chills, blurry vision, ear ringing, ear fullness.  He does note a few episodes of diarrhea and a headache in the last few days.  He has not had any falls or trauma associated.  Due to these symptoms he presented to [facility] ED.  In the ED, he was afebrile with unremarkable vital signs.  Labwork remarkable for AG 15, Ca 8.0, AST 41, downtrending trops from 39 to 30, elevated lactate 2.6, elevated , and negative CT angio head/neck and CT a/p, unremarkable CXR He was given NS 1 L, meclizine 50, reglan 10 IV and admitted for further evaluation.         Problem/Plan - 1:  ·  Problem: Vertigo.  Plan: -Resolved. .   -TTE-nl outpt  check orthostatic BP.CONT IVF.Cardiology consult appreciated.     Problem/Plan - 2:  ·  Problem: Essential Hypertension. -controlled.c/w Lisinopril,Nifedepine   Problem/Plan - 3:  ·  Problem: Mild cognitive impairment.  Plan:  Doing well. .      Problem/Plan - 4:  ·  Problem: Benign prostatic hyperplasia without lower urinary tract symptoms.  Plan: On dutasteride and Silodosin as outpatient.  Will hold silodosin as frequently causes some orthostatic hypotension  Continue dutasteride.      Problem/Plan - 5:  ·  Problem: Hyperlipidemia, unspecified hyperlipidemia type.  Plan: Statin      Problem/Plan - 6:  Problem: Hypocalcemia.,Hypomagnesemia,Hypophosphatemia-supplement Elevated intact PTH-hYPERPARATHYROIDISM?.  h/o Nephrolithiasis-Endo . F/U     Problem/Plan - 7:  ·  Problem: Need for prophylactic measure.  Plan: Improve score 1 for age.  SCD for DVT prophylaxis

## 2021-07-04 NOTE — PROGRESS NOTE ADULT - ASSESSMENT
Assessment and Plan    79 yo male PMHx HTN, HLD, CVA (ataxia as residual,) and Vertigo p/w generalized weakness, nausea, vomiting and diarrhea x2 days.    EKG: NSR non-specific STT changes  Tele: NSR PACs    1. Dizziness  -describes as lightheaded sensation  -orthostatics borderline on June 29th  -obtain repeat orthostatics  - Echo done outpatient 3 months ago reported normal  - carotid dopplers shows 16 - 49%    2. HTN  -controlled  -c/w lisinopril and nifedipine  -continue to monitor BP     3. DVT prophylaxis  -lovenox subq

## 2021-07-05 LAB
ALBUMIN SERPL ELPH-MCNC: 3.1 G/DL — LOW (ref 3.3–5)
ALP SERPL-CCNC: 65 U/L — SIGNIFICANT CHANGE UP (ref 40–120)
ALT FLD-CCNC: 17 U/L — SIGNIFICANT CHANGE UP (ref 4–41)
ANION GAP SERPL CALC-SCNC: 9 MMOL/L — SIGNIFICANT CHANGE UP (ref 7–14)
AST SERPL-CCNC: 15 U/L — SIGNIFICANT CHANGE UP (ref 4–40)
BASOPHILS # BLD AUTO: 0.02 K/UL — SIGNIFICANT CHANGE UP (ref 0–0.2)
BASOPHILS NFR BLD AUTO: 0.2 % — SIGNIFICANT CHANGE UP (ref 0–2)
BILIRUB SERPL-MCNC: 0.6 MG/DL — SIGNIFICANT CHANGE UP (ref 0.2–1.2)
BUN SERPL-MCNC: 12 MG/DL — SIGNIFICANT CHANGE UP (ref 7–23)
CALCIUM SERPL-MCNC: 8.3 MG/DL — LOW (ref 8.4–10.5)
CHLORIDE SERPL-SCNC: 104 MMOL/L — SIGNIFICANT CHANGE UP (ref 98–107)
CO2 SERPL-SCNC: 28 MMOL/L — SIGNIFICANT CHANGE UP (ref 22–31)
CREAT SERPL-MCNC: 0.72 MG/DL — SIGNIFICANT CHANGE UP (ref 0.5–1.3)
CULTURE RESULTS: SIGNIFICANT CHANGE UP
EOSINOPHIL # BLD AUTO: 0.29 K/UL — SIGNIFICANT CHANGE UP (ref 0–0.5)
EOSINOPHIL NFR BLD AUTO: 3.1 % — SIGNIFICANT CHANGE UP (ref 0–6)
GLUCOSE SERPL-MCNC: 98 MG/DL — SIGNIFICANT CHANGE UP (ref 70–99)
HCT VFR BLD CALC: 39.9 % — SIGNIFICANT CHANGE UP (ref 39–50)
HGB BLD-MCNC: 12.7 G/DL — LOW (ref 13–17)
IANC: 6.86 K/UL — SIGNIFICANT CHANGE UP (ref 1.5–8.5)
IMM GRANULOCYTES NFR BLD AUTO: 0.3 % — SIGNIFICANT CHANGE UP (ref 0–1.5)
LYMPHOCYTES # BLD AUTO: 1.35 K/UL — SIGNIFICANT CHANGE UP (ref 1–3.3)
LYMPHOCYTES # BLD AUTO: 14.5 % — SIGNIFICANT CHANGE UP (ref 13–44)
MAGNESIUM SERPL-MCNC: 1.7 MG/DL — SIGNIFICANT CHANGE UP (ref 1.6–2.6)
MCHC RBC-ENTMCNC: 29.3 PG — SIGNIFICANT CHANGE UP (ref 27–34)
MCHC RBC-ENTMCNC: 31.8 GM/DL — LOW (ref 32–36)
MCV RBC AUTO: 92.1 FL — SIGNIFICANT CHANGE UP (ref 80–100)
MONOCYTES # BLD AUTO: 0.79 K/UL — SIGNIFICANT CHANGE UP (ref 0–0.9)
MONOCYTES NFR BLD AUTO: 8.5 % — SIGNIFICANT CHANGE UP (ref 2–14)
NEUTROPHILS # BLD AUTO: 6.86 K/UL — SIGNIFICANT CHANGE UP (ref 1.8–7.4)
NEUTROPHILS NFR BLD AUTO: 73.4 % — SIGNIFICANT CHANGE UP (ref 43–77)
NRBC # BLD: 0 /100 WBCS — SIGNIFICANT CHANGE UP
NRBC # FLD: 0 K/UL — SIGNIFICANT CHANGE UP
PHOSPHATE SERPL-MCNC: 2.3 MG/DL — LOW (ref 2.5–4.5)
PLATELET # BLD AUTO: 249 K/UL — SIGNIFICANT CHANGE UP (ref 150–400)
POTASSIUM SERPL-MCNC: 3.7 MMOL/L — SIGNIFICANT CHANGE UP (ref 3.5–5.3)
POTASSIUM SERPL-SCNC: 3.7 MMOL/L — SIGNIFICANT CHANGE UP (ref 3.5–5.3)
PROT SERPL-MCNC: 6.4 G/DL — SIGNIFICANT CHANGE UP (ref 6–8.3)
RBC # BLD: 4.33 M/UL — SIGNIFICANT CHANGE UP (ref 4.2–5.8)
RBC # FLD: 13.1 % — SIGNIFICANT CHANGE UP (ref 10.3–14.5)
SODIUM SERPL-SCNC: 141 MMOL/L — SIGNIFICANT CHANGE UP (ref 135–145)
SPECIMEN SOURCE: SIGNIFICANT CHANGE UP
WBC # BLD: 9.34 K/UL — SIGNIFICANT CHANGE UP (ref 3.8–10.5)
WBC # FLD AUTO: 9.34 K/UL — SIGNIFICANT CHANGE UP (ref 3.8–10.5)

## 2021-07-05 RX ADMIN — Medication 1 TABLET(S): at 06:58

## 2021-07-05 RX ADMIN — FINASTERIDE 5 MILLIGRAM(S): 5 TABLET, FILM COATED ORAL at 12:23

## 2021-07-05 RX ADMIN — Medication 1000 UNIT(S): at 12:23

## 2021-07-05 RX ADMIN — ENOXAPARIN SODIUM 40 MILLIGRAM(S): 100 INJECTION SUBCUTANEOUS at 17:23

## 2021-07-05 RX ADMIN — SODIUM CHLORIDE 75 MILLILITER(S): 9 INJECTION INTRAMUSCULAR; INTRAVENOUS; SUBCUTANEOUS at 12:23

## 2021-07-05 RX ADMIN — SODIUM CHLORIDE 75 MILLILITER(S): 9 INJECTION INTRAMUSCULAR; INTRAVENOUS; SUBCUTANEOUS at 21:10

## 2021-07-05 RX ADMIN — Medication 90 MILLIGRAM(S): at 06:58

## 2021-07-05 RX ADMIN — SENNA PLUS 2 TABLET(S): 8.6 TABLET ORAL at 21:10

## 2021-07-05 RX ADMIN — Medication 1 TABLET(S): at 17:23

## 2021-07-05 RX ADMIN — Medication 81 MILLIGRAM(S): at 12:23

## 2021-07-05 RX ADMIN — ATORVASTATIN CALCIUM 40 MILLIGRAM(S): 80 TABLET, FILM COATED ORAL at 21:10

## 2021-07-05 RX ADMIN — LISINOPRIL 20 MILLIGRAM(S): 2.5 TABLET ORAL at 06:58

## 2021-07-05 RX ADMIN — TAMSULOSIN HYDROCHLORIDE 0.4 MILLIGRAM(S): 0.4 CAPSULE ORAL at 21:10

## 2021-07-05 NOTE — PROGRESS NOTE ADULT - ASSESSMENT
78 year old man with a history of HTN, HLD, kidney stones, GERD, BPH, and possible dementia who presents with subacute dizziness vs vertigo.  He reports he was in his usual state of health until about 6-7 weeks ago when developed intermittent short lived dizziness.  He reports his symptoms would come on occasionally with walking 1-2 blocks and possibly had a room spinning sensation.  Would occur maybe a few times a day and would last a few minutes before resolving spontaneously.  No associated nausea, vomitting, muscle aches, chest pain, palpitations, fevers, chills, blurry vision, ear ringing, ear fullness.  He does note a few episodes of diarrhea and a headache in the last few days.  He has not had any falls or trauma associated.  Due to these symptoms he presented to [facility] ED.  In the ED, he was afebrile with unremarkable vital signs.  Labwork remarkable for AG 15, Ca 8.0, AST 41, downtrending trops from 39 to 30, elevated lactate 2.6, elevated , and negative CT angio head/neck and CT a/p, unremarkable CXR He was given NS 1 L, meclizine 50, reglan 10 IV and admitted for further evaluation.         Problem/Plan - 1:  ·  Problem: Vertigo.  Plan: -Resolved. .   -TTE-nl outpt  check orthostatic BP.CONT IVF.Cardiology consult appreciated.     Problem/Plan - 2:  ·  Problem: Essential Hypertension. -controlled.c/w Lisinopril,Nifedepine   Problem/Plan - 3:  ·  Problem: Mild cognitive impairment.  Plan:  Doing well. .      Problem/Plan - 4:  ·  Problem: Benign prostatic hyperplasia without lower urinary tract symptoms.  Plan: On dutasteride and Silodosin as outpatient.  Will hold silodosin as frequently causes some orthostatic hypotension  Continue dutasteride.      Problem/Plan - 5:  ·  Problem: Hyperlipidemia, unspecified hyperlipidemia type.  Plan: Statin      Problem/Plan - 6:  Problem: Hypocalcemia.,Hypomagnesemia,Hypophosphatemia-supplement Elevated intact PTH-hYPERPARATHYROIDISM?.  h/o Nephrolithiasis-Endo . F/U     Problem/Plan - 7:  ·  Problem: Need for prophylactic measure.  Plan: Improve score 1 for age.  SCD for DVT prophylaxis

## 2021-07-06 ENCOUNTER — TRANSCRIPTION ENCOUNTER (OUTPATIENT)
Age: 81
End: 2021-07-06

## 2021-07-06 VITALS
OXYGEN SATURATION: 97 % | TEMPERATURE: 98 F | HEART RATE: 88 BPM | SYSTOLIC BLOOD PRESSURE: 115 MMHG | RESPIRATION RATE: 18 BRPM | DIASTOLIC BLOOD PRESSURE: 76 MMHG

## 2021-07-06 LAB
ALBUMIN SERPL ELPH-MCNC: 3.4 G/DL — SIGNIFICANT CHANGE UP (ref 3.3–5)
ALP SERPL-CCNC: 67 U/L — SIGNIFICANT CHANGE UP (ref 40–120)
ALT FLD-CCNC: 17 U/L — SIGNIFICANT CHANGE UP (ref 4–41)
ANION GAP SERPL CALC-SCNC: 11 MMOL/L — SIGNIFICANT CHANGE UP (ref 7–14)
AST SERPL-CCNC: 15 U/L — SIGNIFICANT CHANGE UP (ref 4–40)
BASOPHILS # BLD AUTO: 0.04 K/UL — SIGNIFICANT CHANGE UP (ref 0–0.2)
BASOPHILS NFR BLD AUTO: 0.4 % — SIGNIFICANT CHANGE UP (ref 0–2)
BILIRUB SERPL-MCNC: 0.6 MG/DL — SIGNIFICANT CHANGE UP (ref 0.2–1.2)
BUN SERPL-MCNC: 9 MG/DL — SIGNIFICANT CHANGE UP (ref 7–23)
CALCIUM SERPL-MCNC: 9 MG/DL — SIGNIFICANT CHANGE UP (ref 8.4–10.5)
CHLORIDE SERPL-SCNC: 102 MMOL/L — SIGNIFICANT CHANGE UP (ref 98–107)
CO2 SERPL-SCNC: 27 MMOL/L — SIGNIFICANT CHANGE UP (ref 22–31)
CREAT SERPL-MCNC: 0.7 MG/DL — SIGNIFICANT CHANGE UP (ref 0.5–1.3)
EOSINOPHIL # BLD AUTO: 0.31 K/UL — SIGNIFICANT CHANGE UP (ref 0–0.5)
EOSINOPHIL NFR BLD AUTO: 3.2 % — SIGNIFICANT CHANGE UP (ref 0–6)
GLUCOSE SERPL-MCNC: 94 MG/DL — SIGNIFICANT CHANGE UP (ref 70–99)
HCT VFR BLD CALC: 38.1 % — LOW (ref 39–50)
HGB BLD-MCNC: 12.2 G/DL — LOW (ref 13–17)
IANC: 6.56 K/UL — SIGNIFICANT CHANGE UP (ref 1.5–8.5)
IMM GRANULOCYTES NFR BLD AUTO: 0.7 % — SIGNIFICANT CHANGE UP (ref 0–1.5)
LYMPHOCYTES # BLD AUTO: 1.87 K/UL — SIGNIFICANT CHANGE UP (ref 1–3.3)
LYMPHOCYTES # BLD AUTO: 19.1 % — SIGNIFICANT CHANGE UP (ref 13–44)
MAGNESIUM SERPL-MCNC: 1.6 MG/DL — SIGNIFICANT CHANGE UP (ref 1.6–2.6)
MCHC RBC-ENTMCNC: 28.6 PG — SIGNIFICANT CHANGE UP (ref 27–34)
MCHC RBC-ENTMCNC: 32 GM/DL — SIGNIFICANT CHANGE UP (ref 32–36)
MCV RBC AUTO: 89.2 FL — SIGNIFICANT CHANGE UP (ref 80–100)
MONOCYTES # BLD AUTO: 0.95 K/UL — HIGH (ref 0–0.9)
MONOCYTES NFR BLD AUTO: 9.7 % — SIGNIFICANT CHANGE UP (ref 2–14)
NEUTROPHILS # BLD AUTO: 6.56 K/UL — SIGNIFICANT CHANGE UP (ref 1.8–7.4)
NEUTROPHILS NFR BLD AUTO: 66.9 % — SIGNIFICANT CHANGE UP (ref 43–77)
NRBC # BLD: 0 /100 WBCS — SIGNIFICANT CHANGE UP
NRBC # FLD: 0 K/UL — SIGNIFICANT CHANGE UP
PHOSPHATE SERPL-MCNC: 2.7 MG/DL — SIGNIFICANT CHANGE UP (ref 2.5–4.5)
PLATELET # BLD AUTO: 279 K/UL — SIGNIFICANT CHANGE UP (ref 150–400)
POTASSIUM SERPL-MCNC: 3.4 MMOL/L — LOW (ref 3.5–5.3)
POTASSIUM SERPL-SCNC: 3.4 MMOL/L — LOW (ref 3.5–5.3)
PROT SERPL-MCNC: 6.2 G/DL — SIGNIFICANT CHANGE UP (ref 6–8.3)
RBC # BLD: 4.27 M/UL — SIGNIFICANT CHANGE UP (ref 4.2–5.8)
RBC # FLD: 12.9 % — SIGNIFICANT CHANGE UP (ref 10.3–14.5)
SODIUM SERPL-SCNC: 140 MMOL/L — SIGNIFICANT CHANGE UP (ref 135–145)
WBC # BLD: 9.8 K/UL — SIGNIFICANT CHANGE UP (ref 3.8–10.5)
WBC # FLD AUTO: 9.8 K/UL — SIGNIFICANT CHANGE UP (ref 3.8–10.5)

## 2021-07-06 RX ORDER — CHOLECALCIFEROL (VITAMIN D3) 125 MCG
1000 CAPSULE ORAL
Qty: 30 | Refills: 0
Start: 2021-07-06 | End: 2021-08-04

## 2021-07-06 RX ORDER — FINASTERIDE 5 MG/1
1 TABLET, FILM COATED ORAL
Qty: 30 | Refills: 0
Start: 2021-07-06 | End: 2021-08-04

## 2021-07-06 RX ORDER — MECLIZINE HCL 12.5 MG
1 TABLET ORAL
Qty: 90 | Refills: 0
Start: 2021-07-06 | End: 2021-08-04

## 2021-07-06 RX ORDER — POTASSIUM CHLORIDE 20 MEQ
10 PACKET (EA) ORAL
Refills: 0 | Status: COMPLETED | OUTPATIENT
Start: 2021-07-06 | End: 2021-07-06

## 2021-07-06 RX ORDER — TAMSULOSIN HYDROCHLORIDE 0.4 MG/1
1 CAPSULE ORAL
Qty: 30 | Refills: 0
Start: 2021-07-06 | End: 2021-08-04

## 2021-07-06 RX ORDER — DUTASTERIDE 0.5 MG/1
1 CAPSULE, LIQUID FILLED ORAL
Qty: 0 | Refills: 0 | DISCHARGE

## 2021-07-06 RX ORDER — SILODOSIN 4 MG/1
1 CAPSULE ORAL
Qty: 0 | Refills: 0 | DISCHARGE

## 2021-07-06 RX ORDER — CALCIUM CARBONATE 500(1250)
1 TABLET ORAL
Qty: 60 | Refills: 0
Start: 2021-07-06 | End: 2021-08-04

## 2021-07-06 RX ADMIN — Medication 90 MILLIGRAM(S): at 05:46

## 2021-07-06 RX ADMIN — Medication 81 MILLIGRAM(S): at 11:24

## 2021-07-06 RX ADMIN — FINASTERIDE 5 MILLIGRAM(S): 5 TABLET, FILM COATED ORAL at 11:24

## 2021-07-06 RX ADMIN — SODIUM CHLORIDE 75 MILLILITER(S): 9 INJECTION INTRAMUSCULAR; INTRAVENOUS; SUBCUTANEOUS at 05:46

## 2021-07-06 RX ADMIN — Medication 1000 UNIT(S): at 11:23

## 2021-07-06 RX ADMIN — LISINOPRIL 20 MILLIGRAM(S): 2.5 TABLET ORAL at 05:46

## 2021-07-06 RX ADMIN — Medication 100 MILLIEQUIVALENT(S): at 09:58

## 2021-07-06 RX ADMIN — Medication 100 MILLIEQUIVALENT(S): at 12:49

## 2021-07-06 RX ADMIN — Medication 1 TABLET(S): at 05:46

## 2021-07-06 RX ADMIN — Medication 100 MILLIEQUIVALENT(S): at 11:21

## 2021-07-06 NOTE — DISCHARGE NOTE PROVIDER - NSDCFUADDAPPT_GEN_ALL_CORE_FT
Follow up with PCP within 1-2 weeks of discharge.   Follow up with cardiology within 1-2 weeks of discharge.

## 2021-07-06 NOTE — PROGRESS NOTE ADULT - ASSESSMENT
Assessment and Plan    81 yo male PMHx HTN, HLD, CVA (ataxia as residual,) and Vertigo p/w generalized weakness, nausea, vomiting and diarrhea x2 days.    EKG: NSR non-specific STT changes  Tele: NSR PACs    1. Dizziness  -describes as lightheaded sensation  -orthostatics borderline on June 29th  -resolved  - Echo done outpatient 3 months ago reported normal  - carotid dopplers shows 16 - 49%    2. HTN  -controlled  -c/w lisinopril and nifedipine  -continue to monitor BP     3. DVT prophylaxis  -lovenox subq

## 2021-07-06 NOTE — DISCHARGE NOTE PROVIDER - CARE PROVIDER_API CALL
Jassi Garber)  Internal Medicine  [address] [place] [address]  Phone: [phone]  Fax: [phone]  Follow Up Time:

## 2021-07-06 NOTE — PROGRESS NOTE ADULT - SUBJECTIVE AND OBJECTIVE BOX
BRYAN SHARIFA  80y  Male      Patient is a 80y old  Male who presents with a chief complaint of electrolytes disturbance, generalized weakness (2021 20:29)  c/o dizziness occasionally.no cp,no sob,no fever,no cough    REVIEW OF SYSTEMS:  CONSTITUTIONAL: No fever  RESPIRATORY: No cough, hemoptysis or shortness of breath  CARDIOVASCULAR: No chest pain, palpitations, dizziness, or leg swelling  GASTROINTESTINAL: No abdominal pain. nausea, vomiting, hematemesis  GENITOURINARY: No dysuria, frequency, hematuria   NEUROLOGICAL: No headaches, no dizziness  MUSCULOSKELETAL: No joint pain or swelling;     INTERVAL HPI/OVERNIGHT EVENTS:  T(C): 36.4 (21 @ 09:00), Max: 37.1 (21 @ 21:40)  HR: 69 (21 @ 09:00) (67 - 105)  BP: 125/63 (21 @ 09:00) (119/78 - 128/71)  RR: 18 (21 @ 09:00) (18 - 20)  SpO2: 96% (21 @ 09:00) (93% - 96%)  Wt(kg): --  I&O's Summary    2021 07:  -  2021 07:00  --------------------------------------------------------  IN: 900 mL / OUT: 525 mL / NET: 375 mL    2021 07:  -  2021 18:21  --------------------------------------------------------  IN: 638 mL / OUT: 850 mL / NET: -212 mL      T(C): 36.4 (21 @ 09:00), Max: 37.1 (21 @ 21:40)  HR: 69 (21 @ 09:00) (67 - 105)  BP: 125/63 (21 @ 09:00) (119/78 - 128/71)  RR: 18 (21 @ 09:00) (18 - 20)  SpO2: 96% (21 @ 09:00) (93% - 96%)  Wt(kg): --Vital Signs Last 24 Hrs  T(C): 36.4 (2021 09:00), Max: 37.1 (2021 21:40)  T(F): 97.5 (2021 09:00), Max: 98.8 (2021 21:40)  HR: 69 (2021 09:00) (67 - 105)  BP: 125/63 (2021 09:00) (119/78 - 128/71)  BP(mean): 91 (2021 05:35) (86 - 91)  RR: 18 (2021 09:00) (18 - 20)  SpO2: 96% (2021 09:00) (93% - 96%)    LABS:                        12.1   10.05 )-----------( 194      ( 2021 06:54 )             37.9     06-30    140  |  106  |  19  ----------------------------<  98  4.0   |  22  |  0.83    Ca    7.4<L>      2021 06:54  Phos  2.2     06-30  Mg     1.60     06-30    TPro  6.7  /  Alb  3.6  /  TBili  1.4<H>  /  DBili  x   /  AST  14  /  ALT  13  /  AlkPhos  78  06-29      Urinalysis Basic - ( 2021 07:43 )    Color: Yellow / Appearance: Clear / S.020 / pH: x  Gluc: x / Ketone: Negative  / Bili: Negative / Urobili: <2 mg/dL   Blood: x / Protein: Trace / Nitrite: Negative   Leuk Esterase: Negative / RBC: 75 /HPF / WBC 1 /HPF   Sq Epi: x / Non Sq Epi: 1 /HPF / Bacteria: Negative      CAPILLARY BLOOD GLUCOSE            Urinalysis Basic - ( 2021 07:43 )    Color: Yellow / Appearance: Clear / S.020 / pH: x  Gluc: x / Ketone: Negative  / Bili: Negative / Urobili: <2 mg/dL   Blood: x / Protein: Trace / Nitrite: Negative   Leuk Esterase: Negative / RBC: 75 /HPF / WBC 1 /HPF   Sq Epi: x / Non Sq Epi: 1 /HPF / Bacteria: Negative        PAST MEDICAL & SURGICAL HISTORY:  CVA (Cerebral Infarction)  Ataxia as residual    Secondary Seizure Disorder    PU (Peptic Ulcer)    Essential Hypertension    Hyperlipidemia    Vertigo    Status Post Inguinal Hernia Repair    Nephrolithiasis  s/p URS stone extraction         MEDICATIONS  (STANDING):  aspirin enteric coated 81 milliGRAM(s) Oral daily  atorvastatin 40 milliGRAM(s) Oral at bedtime  calcium carbonate   1250 mG (OsCal) 1 Tablet(s) Oral two times a day  cholecalciferol 1000 Unit(s) Oral daily  enoxaparin Injectable 40 milliGRAM(s) SubCutaneous every 24 hours  finasteride 5 milliGRAM(s) Oral daily  lisinopril 20 milliGRAM(s) Oral daily  magnesium oxide 400 milliGRAM(s) Oral three times a day with meals  NIFEdipine XL 90 milliGRAM(s) Oral daily  sodium chloride 0.9%. 1000 milliLiter(s) (75 mL/Hr) IV Continuous <Continuous>  tamsulosin 0.4 milliGRAM(s) Oral at bedtime    MEDICATIONS  (PRN):  meclizine 25 milliGRAM(s) Oral every 8 hours PRN Dizziness        RADIOLOGY & ADDITIONAL TESTS:    Imaging Personally Reviewed:  [ ] YES  [ ] NO    Consultant(s) Notes Reviewed:  [x ] YES  [ ] NO    PHYSICAL EXAM:  GENERAL: Alert and awake lying in bed in no distress  HEAD:  Atraumatic, Normocephalic  EYES: EOMI, SHARYN, conjunctiva and sclera clear  NECK: Supple, No JVD, Normal thyroid  NERVOUS SYSTEM:  Alert & Oriented X3, Motor and sensory systems are intact,   CHEST/LUNG: Bilateral clear breath sounds, no rhochi, no wheezing, no crepitations,  HEART: Regular rate and rhythm; No murmurs, rubs, or gallops  ABDOMEN: Soft, Nontender, Nondistended; Bowel sounds present  EXTREMITIES:   Peripheral Pulses are palpable, no  edema        Care Discussed with Consultants/Other Providers [ ] YES  [ ] NO      Code Status: [] Full Code [] DNR [] DNI [] Goals of Care:   Disposition: [] ICU [] Stroke Unit [] RCU []PCU []Floor [] Discharge Home         FABIO Garber    
Rosalba Stokes - [phone]    Patient is a 80y old  Male who presents with a chief complaint of electrolytes disturbance, generalized weakness (02 Jul 2021 15:50)    f/u leukocytosis.    Interval History/ROS:  no fever. constipated  no n/v/d.  no abdominal pain.  CXR negative.  Remainder of ROS otherwise negative.    PAST MEDICAL & SURGICAL HISTORY:  CVA (Cerebral Infarction)  Ataxia as residual  Secondary Seizure Disorder  PU (Peptic Ulcer)  Essential Hypertension  Hyperlipidemia  Vertigo  Status Post Inguinal Hernia Repair  Nephrolithiasis s/p URS stone extraction 2012    Allergies  No Known Allergies    ANTIMICROBIALS:  none    MEDICATIONS  (STANDING):  aspirin enteric coated 81 daily  atorvastatin 40 at bedtime  enoxaparin Injectable 40 every 24 hours  finasteride 5 daily  lisinopril 20 daily  NIFEdipine XL 90 daily  tamsulosin 0.4 at bedtime    T(F): 97.9 (07-04-21 @ 05:21), Max: 98 (07-03-21 @ 12:30)  HR: 84 (07-04-21 @ 05:21)  BP: 102/57 (07-04-21 @ 05:21)  RR: 18 (07-04-21 @ 05:21)  SpO2: 96% (07-03-21 @ 20:52) (96% - 98%)    PHYSICAL EXAM:  Constitutional: non-toxic, no distress  HEAD/EYES: anicteric  ENT:  supple  Cardiovascular:   normal S1, S2  Respiratory:  clear BS bilaterally, no wheezes, no rales  GI:  soft, non-tender, normal bowel sounds  :  no dennis  Musculoskeletal:  no synovitis, normal ROM  Neurologic: awake and alert, normal strength, no focal findings  Skin:  no rash, no erythema, no phlebitis  Psychiatric:  awake, alert, appropriate mood                        11.5   12.16 )-----------( 229      ( 04 Jul 2021 07:39 )             37.1 07-04    137  |  103  |  18  ----------------------------<  97  4.0   |  24  |  0.78  Ca    8.3      04 Jul 2021 07:35Phos  2.5     07-04Mg     1.80     07-04  TPro  5.7  /  Alb  2.7  /  TBili  0.5  /  DBili  x   /  AST  17  /  ALT  17  /  AlkPhos  62  07-04    WBC Count: 12.16 (07-04-21 @ 07:39)  WBC Count: 17.44 (07-03-21 @ 07:25)  WBC Count: 12.43 (07-02-21 @ 07:30)  WBC Count: 10.37 (07-01-21 @ 07:43)  WBC Count: 10.05 (06-30-21 @ 06:54)  WBC Count: 11.97 (06-29-21 @ 06:22)  WBC Count: 9.16 (06-28-21 @ 12:22)    MICROBIOLOGY:  BC pending    RADIOLOGY:  imaging below personally reviewed and agree with findings    Xray Chest 1 View- PORTABLE-Urgent (Xray Chest 1 View- PORTABLE-Urgent .) (07.03.21 @ 16:08) >  IMPRESSION:  Difficult to assess heart sizeon this projection.  Small focal atelectases in left perihilar region and at left lung base.  No bilateral focal consolidations. No large pleural effusion. No pneumothorax.  No acute bony finding.    CT Head No Cont (07.02.21 @ 21:26) >  IMPRESSION:  No mass effect, hemorrhage or evidence of acute intracranial pathology.    Xray Abdomen 2 Views (07.02.21 @ 12:22) >  IMPRESSION:  Nonobstructive gas pattern.    
SHARIFA ADAMS  80y  Male      Patient is a 80y old  Male who presents with a chief complaint of electrolytes disturbance, generalized weakness (2021 14:52)  comfortable,nad..no cp,no sob,no fever,no cough    MEDICATIONS  (STANDING):  aspirin enteric coated 81 milliGRAM(s) Oral daily  atorvastatin 40 milliGRAM(s) Oral at bedtime  calcium carbonate   1250 mG (OsCal) 1 Tablet(s) Oral two times a day  cholecalciferol 1000 Unit(s) Oral daily  enoxaparin Injectable 40 milliGRAM(s) SubCutaneous every 24 hours  finasteride 5 milliGRAM(s) Oral daily  lisinopril 20 milliGRAM(s) Oral daily  NIFEdipine XL 90 milliGRAM(s) Oral daily  sodium chloride 0.9%. 1000 milliLiter(s) (75 mL/Hr) IV Continuous <Continuous>  tamsulosin 0.4 milliGRAM(s) Oral at bedtime    MEDICATIONS  (PRN):  aluminum hydroxide/magnesium hydroxide/simethicone Suspension 30 milliLiter(s) Oral every 6 hours PRN Dyspepsia  meclizine 25 milliGRAM(s) Oral every 8 hours PRN Dizziness  ondansetron Injectable 4 milliGRAM(s) IV Push every 6 hours PRN Nausea and/or Vomiting    Vital Signs Last 24 Hrs  T(C): 36.4 (2021 20:52), Max: 36.8 (2021 09:57)  T(F): 97.5 (2021 20:52), Max: 98.2 (2021 09:57)  HR: 84 (2021 20:52) (84 - 90)  BP: 106/71 (2021 20:52) (106/71 - 131/82)  BP(mean): --  RR: 18 (2021 20:52) (17 - 20)  SpO2: 96% (2021 20:52) (91% - 98%)    REVIEW OF SYSTEMS:  CONSTITUTIONAL: No fever  RESPIRATORY: No cough, hemoptysis or shortness of breath  CARDIOVASCULAR: No chest pain, palpitations, dizziness, or leg swelling  GASTROINTESTINAL: No abdominal pain. nausea, vomiting, hematemesis  GENITOURINARY: No dysuria, frequency, hematuria   NEUROLOGICAL: No headaches, no dizziness  MUSCULOSKELETAL: No joint pain or swelling;     -        PAST MEDICAL & SURGICAL HISTORY:  CVA (Cerebral Infarction)  Ataxia as residual    Secondary Seizure Disorder    PU (Peptic Ulcer)    Essential Hypertension    Hyperlipidemia    Vertigo    Status Post Inguinal Hernia Repair    Nephrolithiasis  s/p URS stone extraction       LABS:      139  |  105  |  17  ----------------------------<  105<H>  4.0   |  24  |  0.80    Ca    8.2<L>      2021 07:25  Phos  2.2       Mg     1.90         TPro  6.0  /  Alb  3.1<L>  /  TBili  1.0  /  DBili      /  AST  13  /  ALT  12  /  AlkPhos  67      Creatinine Trend: 0.80 <--, 0.73 <--, 0.69 <--, 0.83 <--, 1.03 <--, 1.04 <--, 0.96 <--, 0.97 <--, 0.95 <--                        12.7   17.44 )-----------( 242      ( 2021 07:25 )             40.1     Urine Studies:  Urinalysis Basic - ( 2021 07:43 )    Color: Yellow / Appearance: Clear / S.020 / pH:   Gluc:  / Ketone: Negative  / Bili: Negative / Urobili: <2 mg/dL   Blood:  / Protein: Trace / Nitrite: Negative   Leuk Esterase: Negative / RBC: 75 /HPF / WBC 1 /HPF   Sq Epi:  / Non Sq Epi: 1 /HPF / Bacteria: Negative              LIVER FUNCTIONS - ( 2021 07:25 )  Alb: 3.1 g/dL / Pro: 6.0 g/dL / ALK PHOS: 67 U/L / ALT: 12 U/L / AST: 13 U/L / GGT: x               Imaging Personally Reviewed:  [ ] YES  [ ] NO    Consultant(s) Notes Reviewed:  [x ] YES  [ ] NO    PHYSICAL EXAM:  GENERAL: Alert and awake lying in bed in no distress  HEAD:  Atraumatic, Normocephalic  EYES: EOMI, SHARYN, conjunctiva and sclera clear  NECK: Supple, No JVD, Normal thyroid  NERVOUS SYSTEM:  Alert & Oriented X3, Motor and sensory systems are intact,   CHEST/LUNG: Bilateral clear breath sounds, no rhochi, no wheezing, no crepitations,  HEART: Regular rate and rhythm; No murmurs, rubs, or gallops  ABDOMEN: Soft, Nontender, Nondistended; Bowel sounds present  EXTREMITIES:   Peripheral Pulses are palpable, no  edema        Care Discussed with Consultants/Other Providers [ ] YES  [ ] NO      Code Status: [] Full Code [] DNR [] DNI [] Goals of Care:   Disposition: [] ICU [] Stroke Unit [] RCU []PCU []Floor [] Discharge Home         ALDO Garber.FACP    
Sunil Cueto MD  Interventional Cardiology / Advance Heart Failure and Cardiac Transplant Specialist  [place] Office : [address] [place] [address] [facility] [address]  Tel:   [place] Office : [address] [facility] [place] [address]  Tel: [phone]  Cell : [phone]    Pt is lying in bed comfortable not in distress, no chest pains no SOB no palpitations  	  MEDICATIONS:  aspirin enteric coated 81 milliGRAM(s) Oral daily  enoxaparin Injectable 40 milliGRAM(s) SubCutaneous every 24 hours  lisinopril 20 milliGRAM(s) Oral daily  NIFEdipine XL 90 milliGRAM(s) Oral daily  tamsulosin 0.4 milliGRAM(s) Oral at bedtime        meclizine 25 milliGRAM(s) Oral every 8 hours PRN  ondansetron Injectable 4 milliGRAM(s) IV Push every 6 hours PRN    aluminum hydroxide/magnesium hydroxide/simethicone Suspension 30 milliLiter(s) Oral every 6 hours PRN    atorvastatin 40 milliGRAM(s) Oral at bedtime  finasteride 5 milliGRAM(s) Oral daily    calcium carbonate   1250 mG (OsCal) 1 Tablet(s) Oral two times a day  cholecalciferol 1000 Unit(s) Oral daily  sodium chloride 0.9%. 1000 milliLiter(s) IV Continuous <Continuous>      PAST MEDICAL/SURGICAL HISTORY  PAST MEDICAL & SURGICAL HISTORY:  CVA (Cerebral Infarction)  Ataxia as residual    Secondary Seizure Disorder    PU (Peptic Ulcer)    Essential Hypertension    Hyperlipidemia    Vertigo    Status Post Inguinal Hernia Repair    Nephrolithiasis  s/p URS stone extraction 2012        SOCIAL HISTORY: Substance Use (street drugs): ( x ) never used  (  ) other:    FAMILY HISTORY:  Acute myocardial infarction         PHYSICAL EXAM:  T(C): 36.8 (07-03-21 @ 09:57), Max: 36.8 (07-03-21 @ 09:57)  HR: 86 (07-03-21 @ 09:57) (86 - 95)  BP: 131/82 (07-03-21 @ 09:57) (110/73 - 131/82)  RR: 19 (07-03-21 @ 09:57) (17 - 19)  SpO2: 91% (07-03-21 @ 09:57) (91% - 95%)  Wt(kg): --  I&O's Summary    02 Jul 2021 07:01  -  03 Jul 2021 07:00  --------------------------------------------------------  IN: 650 mL / OUT: 1400 mL / NET: -750 mL             EYES: EOMI, PERRLA, conjunctiva and sclera clear  ENMT: No tonsillar erythema, exudates, or enlargement; Moist mucous membranes, Good dentition, No lesions  Cardiovascular: Normal S1 S2, No JVD, No murmurs, No edema  Respiratory: Lungs clear to auscultation	  Gastrointestinal:  Soft, Non-tender, + BS	  Extremities:  no edema                                    12.7   17.44 )-----------( 242      ( 03 Jul 2021 07:25 )             40.1     07-03    139  |  105  |  17  ----------------------------<  105<H>  4.0   |  24  |  0.80    Ca    8.2<L>      03 Jul 2021 07:25  Phos  2.2     07-03  Mg     1.90     07-03    TPro  6.0  /  Alb  3.1<L>  /  TBili  1.0  /  DBili  x   /  AST  13  /  ALT  12  /  AlkPhos  67  07-03    proBNP:   Lipid Profile:   HgA1c:   TSH:     Consultant(s) Notes Reviewed:  [x ] YES  [ ] NO    Care Discussed with Consultants/Other Providers [ x] YES  [ ] NO    Imaging Personally Reviewed independently:  [x] YES  [ ] NO    All labs, radiologic studies, vitals, orders and medications list reviewed. Patient is seen and examined at bedside. Case discussed with medical team.        
Sunil Cueto MD  Interventional Cardiology / Advance Heart Failure and Cardiac Transplant Specialist  [place] Office : [address] [place] [address] [place] [address]  Tel:   [place] Office : [address] [facility] [place] [address]  Tel: [phone]  Cell : [phone]    Pt is lying in bed comfortable not in distress, no chest pains no SOB no palpitations  	  MEDICATIONS:  aspirin enteric coated 81 milliGRAM(s) Oral daily  enoxaparin Injectable 40 milliGRAM(s) SubCutaneous every 24 hours  lisinopril 20 milliGRAM(s) Oral daily  NIFEdipine XL 90 milliGRAM(s) Oral daily  tamsulosin 0.4 milliGRAM(s) Oral at bedtime        meclizine 25 milliGRAM(s) Oral every 8 hours PRN  ondansetron Injectable 4 milliGRAM(s) IV Push every 6 hours PRN    aluminum hydroxide/magnesium hydroxide/simethicone Suspension 30 milliLiter(s) Oral every 6 hours PRN  senna 2 Tablet(s) Oral at bedtime    atorvastatin 40 milliGRAM(s) Oral at bedtime  finasteride 5 milliGRAM(s) Oral daily    calcium carbonate   1250 mG (OsCal) 1 Tablet(s) Oral two times a day  cholecalciferol 1000 Unit(s) Oral daily  sodium chloride 0.9%. 1000 milliLiter(s) IV Continuous <Continuous>      PAST MEDICAL/SURGICAL HISTORY  PAST MEDICAL & SURGICAL HISTORY:  CVA (Cerebral Infarction)  Ataxia as residual    Secondary Seizure Disorder    PU (Peptic Ulcer)    Essential Hypertension    Hyperlipidemia    Vertigo    Status Post Inguinal Hernia Repair    Nephrolithiasis  s/p URS stone extraction 2012        SOCIAL HISTORY: Substance Use (street drugs): ( x ) never used  (  ) other:    FAMILY HISTORY:  Acute myocardial infarction        REVIEW OF SYSTEMS:  CONSTITUTIONAL: No fever, weight loss, or fatigue  EYES: No eye pain, visual disturbances, or discharge  ENMT:  No difficulty hearing, tinnitus, vertigo; No sinus or throat pain  BREASTS: No pain, masses, or nipple discharge  GASTROINTESTINAL: No abdominal or epigastric pain. No nausea, vomiting, or hematemesis; No diarrhea or constipation. No melena or hematochezia.  GENITOURINARY: No dysuria, frequency, hematuria, or incontinence  NEUROLOGICAL: No headaches, memory loss, loss of strength, numbness, or tremors  ENDOCRINE: No heat or cold intolerance; No hair loss  MUSCULOSKELETAL: No joint pain or swelling; No muscle, back, or extremity pain  PSYCHIATRIC: No depression, anxiety, mood swings, or difficulty sleeping  HEME/LYMPH: No easy bruising, or bleeding gums  All others negative    PHYSICAL EXAM:  T(C): 36.4 (07-05-21 @ 12:07), Max: 37 (07-05-21 @ 06:50)  HR: 90 (07-05-21 @ 12:07) (78 - 90)  BP: 140/92 (07-05-21 @ 12:07) (138/89 - 146/103)  RR: 18 (07-05-21 @ 12:07) (18 - 18)  SpO2: 96% (07-05-21 @ 12:07) (95% - 96%)  Wt(kg): --  I&O's Summary    04 Jul 2021 07:01  -  05 Jul 2021 07:00  --------------------------------------------------------  IN: 440 mL / OUT: 900 mL / NET: -460 mL    05 Jul 2021 07:01  -  05 Jul 2021 15:49  --------------------------------------------------------  IN: 118 mL / OUT: 550 mL / NET: -432 mL          GENERAL: NAD  EYES: EOMI, PERRLA, conjunctiva and sclera clear  ENMT: No tonsillar erythema, exudates, or enlargement; Moist mucous membranes, Good dentition, No lesions  Cardiovascular: Normal S1 S2, No JVD, No murmurs, No edema  Respiratory: Lungs clear to auscultation	  Gastrointestinal:  Soft, Non-tender, + BS	  Extremities: Normal range of motion, No clubbing, cyanosis or edema  LYMPH: No lymphadenopathy noted  NERVOUS SYSTEM:  Alert & Oriented X3, Good concentration; Motor Strength 5/5 B/L upper and lower extremities; DTRs 2+ intact and symmetric                                    12.7   9.34  )-----------( 249      ( 05 Jul 2021 09:18 )             39.9     07-05    141  |  104  |  12  ----------------------------<  98  3.7   |  28  |  0.72    Ca    8.3<L>      05 Jul 2021 09:18  Phos  2.3     07-05  Mg     1.70     07-05    TPro  6.4  /  Alb  3.1<L>  /  TBili  0.6  /  DBili  x   /  AST  15  /  ALT  17  /  AlkPhos  65  07-05    proBNP:   Lipid Profile:   HgA1c:   TSH:     Consultant(s) Notes Reviewed:  [x ] YES  [ ] NO    Care Discussed with Consultants/Other Providers [ x] YES  [ ] NO    Imaging Personally Reviewed independently:  [x] YES  [ ] NO    All labs, radiologic studies, vitals, orders and medications list reviewed. Patient is seen and examined at bedside. Case discussed with medical team.        
SHARIFA ADAMS  80y  Male      Patient is a 80y old  Male who presents with a chief complaint of electrolytes disturbance, generalized weakness (2021 14:52)  comfortable,nad..no cp,no sob,no fever,no cough    MEDICATIONS  (STANDING):  aspirin enteric coated 81 milliGRAM(s) Oral daily  atorvastatin 40 milliGRAM(s) Oral at bedtime  calcium carbonate   1250 mG (OsCal) 1 Tablet(s) Oral two times a day  cholecalciferol 1000 Unit(s) Oral daily  enoxaparin Injectable 40 milliGRAM(s) SubCutaneous every 24 hours  finasteride 5 milliGRAM(s) Oral daily  lisinopril 20 milliGRAM(s) Oral daily  NIFEdipine XL 90 milliGRAM(s) Oral daily  senna 2 Tablet(s) Oral at bedtime  sodium chloride 0.9%. 1000 milliLiter(s) (75 mL/Hr) IV Continuous <Continuous>  tamsulosin 0.4 milliGRAM(s) Oral at bedtime    MEDICATIONS  (PRN):  aluminum hydroxide/magnesium hydroxide/simethicone Suspension 30 milliLiter(s) Oral every 6 hours PRN Dyspepsia  meclizine 25 milliGRAM(s) Oral every 8 hours PRN Dizziness  ondansetron Injectable 4 milliGRAM(s) IV Push every 6 hours PRN Nausea and/or Vomiting    Vital Signs Last 24 Hrs  T(C): 36.7 (2021 21:40), Max: 36.7 (2021 13:30)  T(F): 98.1 (2021 21:40), Max: 98.1 (2021 21:40)  HR: 88 (2021 21:40) (83 - 88)  BP: 138/89 (2021 21:40) (102/57 - 138/89)  BP(mean): --  RR: 18 (2021 21:40) (18 - 19)  SpO2: 95% (2021 21:40) (95% - 96%)  REVIEW OF SYSTEMS:  CONSTITUTIONAL: No fever  RESPIRATORY: No cough, hemoptysis or shortness of breath  CARDIOVASCULAR: No chest pain, palpitations, dizziness, or leg swelling  GASTROINTESTINAL: No abdominal pain. nausea, vomiting, hematemesis  GENITOURINARY: No dysuria, frequency, hematuria   NEUROLOGICAL: No headaches, no dizziness  MUSCULOSKELETAL: No joint pain or swelling;     -        PAST MEDICAL & SURGICAL HISTORY:  CVA (Cerebral Infarction)  Ataxia as residual    Secondary Seizure Disorder    PU (Peptic Ulcer)    Essential Hypertension    Hyperlipidemia    Vertigo    Status Post Inguinal Hernia Repair    Nephrolithiasis  s/p URS stone extraction     LABS:      137  |  103  |  18  ----------------------------<  97  4.0   |  24  |  0.78    Ca    8.3<L>      2021 07:35  Phos  2.5     07-  Mg     1.80     -04    TPro  5.7<L>  /  Alb  2.7<L>  /  TBili  0.5  /  DBili      /  AST  17  /  ALT  17  /  AlkPhos  62  07-04    Creatinine Trend: 0.78 <--, 0.80 <--, 0.73 <--, 0.69 <--, 0.83 <--, 1.03 <--, 1.04 <--, 0.96 <--, 0.97 <--, 0.95 <--                        11.5   12.16 )-----------( 229      ( 2021 07:39 )             37.1     Urine Studies:  Urinalysis Basic - ( 2021 07:43 )    Color: Yellow / Appearance: Clear / S.020 / pH:   Gluc:  / Ketone: Negative  / Bili: Negative / Urobili: <2 mg/dL   Blood:  / Protein: Trace / Nitrite: Negative   Leuk Esterase: Negative / RBC: 75 /HPF / WBC 1 /HPF   Sq Epi:  / Non Sq Epi: 1 /HPF / Bacteria: Negative              LIVER FUNCTIONS - ( 2021 07:35 )  Alb: 2.7 g/dL / Pro: 5.7 g/dL / ALK PHOS: 62 U/L / ALT: 17 U/L / AST: 17 U/L / GGT: x               / Bili: Negative / Urobili: <2 mg/dL   Blood:  / Protein: Trace / Nitrite: Negative   Leuk Esterase: Negative / RBC: 75 /HPF / WBC 1 /HPF   Sq Epi:  / Non Sq Epi: 1 /HPF / Bacteria: Negative              LIVER FUNCTIONS - ( 2021 07:25 )  Alb: 3.1 g/dL / Pro: 6.0 g/dL / ALK PHOS: 67 U/L / ALT: 12 U/L / AST: 13 U/L / GGT: x               Imaging Personally Reviewed:  [ ] YES  [ ] NO    Consultant(s) Notes Reviewed:  [x ] YES  [ ] NO    PHYSICAL EXAM:  GENERAL: Alert and awake lying in bed in no distress  HEAD:  Atraumatic, Normocephalic  EYES: EOMI, SHARYN, conjunctiva and sclera clear  NECK: Supple, No JVD, Normal thyroid  NERVOUS SYSTEM:  Alert & Oriented X3, Motor and sensory systems are intact,   CHEST/LUNG: Bilateral clear breath sounds, no rhochi, no wheezing, no crepitations,  HEART: Regular rate and rhythm; No murmurs, rubs, or gallops  ABDOMEN: Soft, Nontender, Nondistended; Bowel sounds present  EXTREMITIES:   Peripheral Pulses are palpable, no  edema        Care Discussed with Consultants/Other Providers [ ] YES  [ ] NO      Code Status: [] Full Code [] DNR [] DNI [] Goals of Care:   Disposition: [] ICU [] Stroke Unit [] RCU []PCU []Floor [] Discharge Home         JACK GarberKindred HealthcareP    
SHARIFA ADAMS  80y  Male      Patient is a 80y old  Male who presents with a chief complaint of electrolytes disturbance, generalized weakness (28 Jun 2021 17:06)  Patient was seen and examined.chart reviewed,Admitted with weakness,electrolyte imbalance.feels better.no cp,no sob,no fever,no cough    REVIEW OF SYSTEMS:  CONSTITUTIONAL: No fever  RESPIRATORY: No cough, hemoptysis or shortness of breath  CARDIOVASCULAR: No chest pain, palpitations, dizziness, or leg swelling  GASTROINTESTINAL: No abdominal pain. nausea, vomiting, hematemesis  GENITOURINARY: No dysuria, frequency, hematuria   NEUROLOGICAL: No headaches, no dizziness  MUSCULOSKELETAL: No joint pain or swelling;     INTERVAL HPI/OVERNIGHT EVENTS:  T(C): 36.4 (06-29-21 @ 14:38), Max: 36.8 (06-29-21 @ 10:13)  HR: 100 (06-29-21 @ 14:38) (61 - 100)  BP: 120/64 (06-29-21 @ 14:38) (120/64 - 158/92)  RR: 18 (06-29-21 @ 14:38) (18 - 20)  SpO2: 98% (06-29-21 @ 14:38) (94% - 98%)  Wt(kg): --  I&O's Summary    T(C): 36.4 (06-29-21 @ 14:38), Max: 36.8 (06-29-21 @ 10:13)  HR: 100 (06-29-21 @ 14:38) (61 - 100)  BP: 120/64 (06-29-21 @ 14:38) (120/64 - 158/92)  RR: 18 (06-29-21 @ 14:38) (18 - 20)  SpO2: 98% (06-29-21 @ 14:38) (94% - 98%)  Wt(kg): --Vital Signs Last 24 Hrs  T(C): 36.4 (29 Jun 2021 14:38), Max: 36.8 (29 Jun 2021 10:13)  T(F): 97.5 (29 Jun 2021 14:38), Max: 98.2 (29 Jun 2021 10:13)  HR: 100 (29 Jun 2021 14:38) (61 - 100)  BP: 120/64 (29 Jun 2021 14:38) (120/64 - 158/92)  BP(mean): --  RR: 18 (29 Jun 2021 14:38) (18 - 20)  SpO2: 98% (29 Jun 2021 14:38) (94% - 98%)    LABS:                        13.6   11.97 )-----------( 223      ( 29 Jun 2021 06:22 )             41.5     06-29    141  |  102  |  16  ----------------------------<  88  4.0   |  24  |  1.03    Ca    7.8<L>      29 Jun 2021 06:22  Phos  4.7     06-29  Mg     2.00     06-29    TPro  6.7  /  Alb  3.6  /  TBili  1.4<H>  /  DBili  x   /  AST  14  /  ALT  13  /  AlkPhos  78  06-29        CAPILLARY BLOOD GLUCOSE                PAST MEDICAL & SURGICAL HISTORY:  CVA (Cerebral Infarction)  Ataxia as residual    Secondary Seizure Disorder    PU (Peptic Ulcer)    Essential Hypertension    Hyperlipidemia    Vertigo    Status Post Inguinal Hernia Repair    Nephrolithiasis  s/p URS stone extraction 2012        MEDICATIONS  (STANDING):  aspirin enteric coated 81 milliGRAM(s) Oral daily  atorvastatin 40 milliGRAM(s) Oral at bedtime  calcium carbonate   1250 mG (OsCal) 1 Tablet(s) Oral two times a day  cholecalciferol 1000 Unit(s) Oral daily  enoxaparin Injectable 40 milliGRAM(s) SubCutaneous every 24 hours  finasteride 5 milliGRAM(s) Oral daily  lisinopril 20 milliGRAM(s) Oral daily  magnesium oxide 400 milliGRAM(s) Oral three times a day with meals  NIFEdipine XL 90 milliGRAM(s) Oral daily  sodium chloride 0.9%. 1000 milliLiter(s) (75 mL/Hr) IV Continuous <Continuous>  tamsulosin 0.4 milliGRAM(s) Oral at bedtime    MEDICATIONS  (PRN):  meclizine 25 milliGRAM(s) Oral every 8 hours PRN Dizziness        RADIOLOGY & ADDITIONAL TESTS:    Imaging Personally Reviewed:  [ ] YES  [ ] NO    Consultant(s) Notes Reviewed:  [ c] YES  [ ] NO    PHYSICAL EXAM:  GENERAL: Alert and awake lying in bed in no distress  HEAD:  Atraumatic, Normocephalic  EYES: EOMI, SHARYN, conjunctiva and sclera clear  NECK: Supple, No JVD, Normal thyroid  NERVOUS SYSTEM:  Alert & Oriented X3, Motor and sensory systems are intact,   CHEST/LUNG: Bilateral clear breath sounds, no rhochi, no wheezing, no crepitations,  HEART: Regular rate and rhythm; No murmurs, rubs, or gallops  ABDOMEN: Soft, Nontender, Nondistended; Bowel sounds present  EXTREMITIES:   Peripheral Pulses are palpable, no  edema        Care Discussed with Consultants/Other Providers [ ] YES  [ ] NO      Code Status: [] Full Code [] DNR [] DNI [] Goals of Care:   Disposition: [] ICU [] Stroke Unit [] RCU []PCU []Floor [] Discharge Home         ALDO Garber.FACP    
Follow Up:  leukocytosis    Interval History/ROS: Reports no SOB or coughing. Asking for breakfast. Endorses no BM x2 days. Denies fevers, chills, nausea, vomiting, dysuria.     Allergies  No Known Allergies        ANTIMICROBIALS:    None    OTHER MEDS:  MEDICATIONS  (STANDING):  aluminum hydroxide/magnesium hydroxide/simethicone Suspension 30 every 6 hours PRN  aspirin enteric coated 81 daily  atorvastatin 40 at bedtime  enoxaparin Injectable 40 every 24 hours  finasteride 5 daily  lisinopril 20 daily  meclizine 25 every 8 hours PRN  NIFEdipine XL 90 daily  ondansetron Injectable 4 every 6 hours PRN  tamsulosin 0.4 at bedtime      Vital Signs Last 24 Hrs  T(C): 36.6 (04 Jul 2021 05:21), Max: 36.7 (03 Jul 2021 12:30)  T(F): 97.9 (04 Jul 2021 05:21), Max: 98 (03 Jul 2021 12:30)  HR: 84 (04 Jul 2021 05:21) (84 - 85)  BP: 102/57 (04 Jul 2021 05:21) (102/57 - 125/72)  BP(mean): --  RR: 18 (04 Jul 2021 05:21) (18 - 20)  SpO2: 96% (03 Jul 2021 20:52) (96% - 98%)    PHYSICAL EXAM:  Constitutional: non-toxic, no distress, on 2L NC  HEAD/EYES: anicteric, no conjunctival injection  ENT:  supple, no thrush  Cardiovascular:   normal S1, S2, no murmur, no edema  Respiratory:  clear BS bilaterally, no wheezes, no rales  GI:  soft, non-tender, normal bowel sounds  :  no dennis, no CVA tenderness  Musculoskeletal:  no synovitis, normal ROM  Neurologic: awake and alert, normal strength, no focal findings  Skin:  no rash, no erythema, no phlebitis  Heme/Onc: no lymphadenopathy   Psychiatric:  awake, alert, appropriate mood                                11.5   12.16 )-----------( 229      ( 04 Jul 2021 07:39 )             37.1       07-04    137  |  103  |  18  ----------------------------<  97  4.0   |  24  |  0.78    Ca    8.3<L>      04 Jul 2021 07:35  Phos  2.5     07-04  Mg     1.80     07-04    TPro  5.7<L>  /  Alb  2.7<L>  /  TBili  0.5  /  DBili  x   /  AST  17  /  ALT  17  /  AlkPhos  62  07-04      MICROBIOLOGY:  BC pending    RADIOLOGY:  < from: Xray Chest 1 View- PORTABLE-Urgent (Xray Chest 1 View- PORTABLE-Urgent .) (07.03.21 @ 16:08) >  IMPRESSION:  Difficult to assess heart sizeon this projection.  Small focal atelectases in left perihilar region and at left lung base.  No bilateral focal consolidations. No large pleural effusion. No pneumothorax.  No acute bony finding.      
  Sunil Cueto MD  Interventional Cardiology / Endovascular Specialist  [place] Office : [address] [place] [address] [place] [address]  Tel:   [place] Office : [address] [facility] [place] [address]  Tel: [phone]  Cell : [phone]    Subjective/Overnight events: Patient lying in bed comfortably. No acute distress. Denies chest pain, SOB or palpitations or dizziness  	  MEDICATIONS:  aspirin enteric coated 81 milliGRAM(s) Oral daily  enoxaparin Injectable 40 milliGRAM(s) SubCutaneous every 24 hours  lisinopril 20 milliGRAM(s) Oral daily  NIFEdipine XL 90 milliGRAM(s) Oral daily  tamsulosin 0.4 milliGRAM(s) Oral at bedtime        meclizine 25 milliGRAM(s) Oral every 8 hours PRN  ondansetron Injectable 4 milliGRAM(s) IV Push every 6 hours PRN    aluminum hydroxide/magnesium hydroxide/simethicone Suspension 30 milliLiter(s) Oral every 6 hours PRN  senna 2 Tablet(s) Oral at bedtime    atorvastatin 40 milliGRAM(s) Oral at bedtime  finasteride 5 milliGRAM(s) Oral daily    calcium carbonate   1250 mG (OsCal) 1 Tablet(s) Oral two times a day  cholecalciferol 1000 Unit(s) Oral daily  sodium chloride 0.9%. 1000 milliLiter(s) IV Continuous <Continuous>      PAST MEDICAL/SURGICAL HISTORY  PAST MEDICAL & SURGICAL HISTORY:  CVA (Cerebral Infarction)  Ataxia as residual    Secondary Seizure Disorder    PU (Peptic Ulcer)    Essential Hypertension    Hyperlipidemia    Vertigo    Status Post Inguinal Hernia Repair    Nephrolithiasis  s/p URS stone extraction 2012        SOCIAL HISTORY: Substance Use (street drugs): ( x ) never used  (  ) other:    FAMILY HISTORY:  Acute myocardial infarction        REVIEW OF SYSTEMS:  CONSTITUTIONAL: No fever, weight loss, or fatigue  EYES: No eye pain, visual disturbances, or discharge  ENMT:  No difficulty hearing, tinnitus, vertigo; No sinus or throat pain  BREASTS: No pain, masses, or nipple discharge  GASTROINTESTINAL: No abdominal or epigastric pain. No nausea, vomiting, or hematemesis; No diarrhea or constipation. No melena or hematochezia.  GENITOURINARY: No dysuria, frequency, hematuria, or incontinence  NEUROLOGICAL: No headaches, memory loss, loss of strength, numbness, or tremors  ENDOCRINE: No heat or cold intolerance; No hair loss  MUSCULOSKELETAL: No joint pain or swelling; No muscle, back, or extremity pain  PSYCHIATRIC: No depression, anxiety, mood swings, or difficulty sleeping  HEME/LYMPH: No easy bruising, or bleeding gums  All others negative    PHYSICAL EXAM:  T(C): 36.5 (07-06-21 @ 12:59), Max: 36.8 (07-05-21 @ 20:07)  HR: 88 (07-06-21 @ 12:59) (88 - 92)  BP: 115/76 (07-06-21 @ 12:59) (115/76 - 149/93)  RR: 18 (07-06-21 @ 12:59) (18 - 18)  SpO2: 97% (07-06-21 @ 12:59) (96% - 97%)  Wt(kg): --  I&O's Summary    05 Jul 2021 07:01  -  06 Jul 2021 07:00  --------------------------------------------------------  IN: 758 mL / OUT: 2700 mL / NET: -1942 mL          GENERAL: NAD, well-groomed, well-developed  EYES: EOMI, PERRLA, conjunctiva and sclera clear  ENMT: No tonsillar erythema, exudates, or enlargement; Moist mucous membranes, Good dentition, No lesions  Cardiovascular: Normal S1 S2, No JVD, No murmurs, No edema  Respiratory: Lungs clear to auscultation	  Gastrointestinal:  Soft, Non-tender, + BS	  Extremities: Normal range of motion, No clubbing, cyanosis or edema  LYMPH: No lymphadenopathy noted  NERVOUS SYSTEM:  Alert & Oriented X3, Good concentration; Motor Strength 5/5 B/L upper and lower extremities; DTRs 2+ intact and symmetric                                    12.2   9.80  )-----------( 279      ( 06 Jul 2021 06:33 )             38.1     07-06    140  |  102  |  9   ----------------------------<  94  3.4<L>   |  27  |  0.70    Ca    9.0      06 Jul 2021 06:33  Phos  2.7     07-06  Mg     1.60     07-06    TPro  6.2  /  Alb  3.4  /  TBili  0.6  /  DBili  x   /  AST  15  /  ALT  17  /  AlkPhos  67  07-06    proBNP:   Lipid Profile:   HgA1c:   TSH:     Consultant(s) Notes Reviewed:  [x ] YES  [ ] NO    Care Discussed with Consultants/Other Providers [ x] YES  [ ] NO    Imaging Personally Reviewed independently:  [x] YES  [ ] NO    All labs, radiologic studies, vitals, orders and medications list reviewed. Patient is seen and examined at bedside. Case discussed with medical team.              
BRYAN, SHARIFA  80y  Male      Patient is a 80y old  Male who presents with a chief complaint of electrolytes disturbance, generalized weakness (2021 14:52)  comfortable,nad..no cp,no sob,no fever,no cough    REVIEW OF SYSTEMS:  CONSTITUTIONAL: No fever  RESPIRATORY: No cough, hemoptysis or shortness of breath  CARDIOVASCULAR: No chest pain, palpitations, dizziness, or leg swelling  GASTROINTESTINAL: No abdominal pain. nausea, vomiting, hematemesis  GENITOURINARY: No dysuria, frequency, hematuria   NEUROLOGICAL: No headaches, no dizziness  MUSCULOSKELETAL: No joint pain or swelling;     INTERVAL HPI/OVERNIGHT EVENTS:  T(C): 36.6 (21 @ 16:50), Max: 36.7 (21 @ 21:09)  HR: 87 (21 @ 16:50) (74 - 88)  BP: 131/82 (21 @ 16:50) (110/73 - 131/82)  RR: 19 (21 @ 16:50) (17 - 19)  SpO2: 94% (21 @ 16:50) (94% - 96%)  Wt(kg): --  I&O's Summary    :  -  2021 07:00  --------------------------------------------------------  IN: 1038 mL / OUT: 1850 mL / NET: -812 mL    :  -  2021 18:13  --------------------------------------------------------  IN: 598 mL / OUT: 1475 mL / NET: -877 mL      T(C): 36.6 (21 @ 16:50), Max: 36.7 (21 @ 21:09)  HR: 87 (21 @ 16:50) (74 - 88)  BP: 131/82 (21 @ 16:50) (110/73 - 131/82)  RR: 19 (21 @ 16:50) (17 - 19)  SpO2: 94% (21 @ 16:50) (94% - 96%)  Wt(kg): --Vital Signs Last 24 Hrs  T(C): 36.6 (2021 16:50), Max: 36.7 (2021 21:09)  T(F): 97.8 (2021 16:50), Max: 98 (2021 21:09)  HR: 87 (2021 16:50) (74 - 88)  BP: 131/82 (2021 16:50) (110/73 - 131/82)  BP(mean): --  RR: 19 (2021 16:50) (17 - 19)  SpO2: 94% (2021 16:50) (94% - 96%)    LABS:                        12.4   10.37 )-----------( 204      ( 2021 07:43 )             38.7     07-01    139  |  106  |  11  ----------------------------<  102<H>  4.0   |  25  |  0.69    Ca    7.7<L>      2021 07:43  Phos  1.8     07-  Mg     1.50     07-    TPro  5.9<L>  /  Alb  3.2<L>  /  TBili  0.8  /  DBili  x   /  AST  14  /  ALT  10  /  AlkPhos  66  07-01      Urinalysis Basic - ( 2021 07:43 )    Color: Yellow / Appearance: Clear / S.020 / pH: x  Gluc: x / Ketone: Negative  / Bili: Negative / Urobili: <2 mg/dL   Blood: x / Protein: Trace / Nitrite: Negative   Leuk Esterase: Negative / RBC: 75 /HPF / WBC 1 /HPF   Sq Epi: x / Non Sq Epi: 1 /HPF / Bacteria: Negative      CAPILLARY BLOOD GLUCOSE            Urinalysis Basic - ( 2021 07:43 )    Color: Yellow / Appearance: Clear / S.020 / pH: x  Gluc: x / Ketone: Negative  / Bili: Negative / Urobili: <2 mg/dL   Blood: x / Protein: Trace / Nitrite: Negative   Leuk Esterase: Negative / RBC: 75 /HPF / WBC 1 /HPF   Sq Epi: x / Non Sq Epi: 1 /HPF / Bacteria: Negative        PAST MEDICAL & SURGICAL HISTORY:  CVA (Cerebral Infarction)  Ataxia as residual    Secondary Seizure Disorder    PU (Peptic Ulcer)    Essential Hypertension    Hyperlipidemia    Vertigo    Status Post Inguinal Hernia Repair    Nephrolithiasis  s/p URS stone extraction         MEDICATIONS  (STANDING):  aspirin enteric coated 81 milliGRAM(s) Oral daily  atorvastatin 40 milliGRAM(s) Oral at bedtime  calcium carbonate   1250 mG (OsCal) 1 Tablet(s) Oral two times a day  cholecalciferol 1000 Unit(s) Oral daily  enoxaparin Injectable 40 milliGRAM(s) SubCutaneous every 24 hours  finasteride 5 milliGRAM(s) Oral daily  lisinopril 20 milliGRAM(s) Oral daily  NIFEdipine XL 90 milliGRAM(s) Oral daily  potassium phosphate / sodium phosphate Powder (PHOS-NaK) 1 Packet(s) Oral two times a day  sodium chloride 0.9%. 1000 milliLiter(s) (75 mL/Hr) IV Continuous <Continuous>  tamsulosin 0.4 milliGRAM(s) Oral at bedtime    MEDICATIONS  (PRN):  meclizine 25 milliGRAM(s) Oral every 8 hours PRN Dizziness        RADIOLOGY & ADDITIONAL TESTS:    Imaging Personally Reviewed:  [ ] YES  [ ] NO    Consultant(s) Notes Reviewed:  [x ] YES  [ ] NO    PHYSICAL EXAM:  GENERAL: Alert and awake lying in bed in no distress  HEAD:  Atraumatic, Normocephalic  EYES: EOMI, SHARYN, conjunctiva and sclera clear  NECK: Supple, No JVD, Normal thyroid  NERVOUS SYSTEM:  Alert & Oriented X3, Motor and sensory systems are intact,   CHEST/LUNG: Bilateral clear breath sounds, no rhochi, no wheezing, no crepitations,  HEART: Regular rate and rhythm; No murmurs, rubs, or gallops  ABDOMEN: Soft, Nontender, Nondistended; Bowel sounds present  EXTREMITIES:   Peripheral Pulses are palpable, no  edema        Care Discussed with Consultants/Other Providers [ ] YES  [ ] NO      Code Status: [] Full Code [] DNR [] DNI [] Goals of Care:   Disposition: [] ICU [] Stroke Unit [] RCU []PCU []Floor [] Discharge Home         ROSE GarberP    
SHARIFA ADAMS  80y  Male      Patient is a 80y old  Male who presents with a chief complaint of electrolytes disturbance, generalized weakness (2021 14:52)  comfortable,nad..no cp,no sob,no fever,no cough    MEDICATIONS  (STANDING):  aspirin enteric coated 81 milliGRAM(s) Oral daily  atorvastatin 40 milliGRAM(s) Oral at bedtime  calcium carbonate   1250 mG (OsCal) 1 Tablet(s) Oral two times a day  cholecalciferol 1000 Unit(s) Oral daily  enoxaparin Injectable 40 milliGRAM(s) SubCutaneous every 24 hours  finasteride 5 milliGRAM(s) Oral daily  lisinopril 20 milliGRAM(s) Oral daily  NIFEdipine XL 90 milliGRAM(s) Oral daily  senna 2 Tablet(s) Oral at bedtime  sodium chloride 0.9%. 1000 milliLiter(s) (75 mL/Hr) IV Continuous <Continuous>  tamsulosin 0.4 milliGRAM(s) Oral at bedtime    MEDICATIONS  (PRN):  aluminum hydroxide/magnesium hydroxide/simethicone Suspension 30 milliLiter(s) Oral every 6 hours PRN Dyspepsia  meclizine 25 milliGRAM(s) Oral every 8 hours PRN Dizziness  ondansetron Injectable 4 milliGRAM(s) IV Push every 6 hours PRN Nausea and/or Vomiting    Vital Signs Last 24 Hrs  T(C): 36.8 (2021 20:07), Max: 37 (2021 06:50)  T(F): 98.3 (2021 20:07), Max: 98.6 (2021 06:50)  HR: 92 (2021 20:07) (78 - 92)  BP: 149/93 (2021 20:07) (139/86 - 149/93)  BP(mean): --  RR: 18 (2021 20:07) (18 - 18)  SpO2: 96% (2021 20:07) (95% - 96%)    REVIEW OF SYSTEMS:  CONSTITUTIONAL: No fever  RESPIRATORY: No cough, hemoptysis or shortness of breath  CARDIOVASCULAR: No chest pain, palpitations, dizziness, or leg swelling  GASTROINTESTINAL: No abdominal pain. nausea, vomiting, hematemesis  GENITOURINARY: No dysuria, frequency, hematuria   NEUROLOGICAL: No headaches, no dizziness  MUSCULOSKELETAL: No joint pain or swelling;     -        PAST MEDICAL & SURGICAL HISTORY:  CVA (Cerebral Infarction)  Ataxia as residual    Secondary Seizure Disorder    PU (Peptic Ulcer)    Essential Hypertension    Hyperlipidemia    Vertigo    Status Post Inguinal Hernia Repair    Nephrolithiasis  s/p URS stone extraction     LABS:      141  |  104  |  12  ----------------------------<  98  3.7   |  28  |  0.72    Ca    8.3<L>      2021 09:18  Phos  2.3       Mg     1.70         TPro  6.4  /  Alb  3.1<L>  /  TBili  0.6  /  DBili      /  AST  15  /  ALT  17  /  AlkPhos  65      Creatinine Trend: 0.72 <--, 0.78 <--, 0.80 <--, 0.73 <--, 0.69 <--, 0.83 <--, 1.03 <--, 1.04 <--                        12.7   9.34  )-----------( 249      ( 2021 09:18 )             39.9     Urine Studies:  Urinalysis Basic - ( 2021 07:43 )    Color: Yellow / Appearance: Clear / S.020 / pH:   Gluc:  / Ketone: Negative  / Bili: Negative / Urobili: <2 mg/dL   Blood:  / Protein: Trace / Nitrite: Negative   Leuk Esterase: Negative / RBC: 75 /HPF / WBC 1 /HPF   Sq Epi:  / Non Sq Epi: 1 /HPF / Bacteria: Negative              LIVER FUNCTIONS - ( 2021 09:18 )  Alb: 3.1 g/dL / Pro: 6.4 g/dL / ALK PHOS: 65 U/L / ALT: 17 U/L / AST: 15 U/L / GGT: x                     LIVER FUNCTIONS - ( 2021 07:25 )  Alb: 3.1 g/dL / Pro: 6.0 g/dL / ALK PHOS: 67 U/L / ALT: 12 U/L / AST: 13 U/L / GGT: x               Imaging Personally Reviewed:  [ ] YES  [ ] NO    Consultant(s) Notes Reviewed:  [x ] YES  [ ] NO    PHYSICAL EXAM:  GENERAL: Alert and awake lying in bed in no distress  HEAD:  Atraumatic, Normocephalic  EYES: EOMI, SHARYN, conjunctiva and sclera clear  NECK: Supple, No JVD, Normal thyroid  NERVOUS SYSTEM:  Alert & Oriented X3, Motor and sensory systems are intact,   CHEST/LUNG: Bilateral clear breath sounds, no rhochi, no wheezing, no crepitations,  HEART: Regular rate and rhythm; No murmurs, rubs, or gallops  ABDOMEN: Soft, Nontender, Nondistended; Bowel sounds present  EXTREMITIES:   Peripheral Pulses are palpable, no  edema        Care Discussed with Consultants/Other Providers [ ] YES  [ ] NO      Code Status: [] Full Code [] DNR [] DNI [] Goals of Care:   Disposition: [] ICU [] Stroke Unit [] RCU []PCU []Floor [] Discharge Home         ALDO Garber.FACP    
Sunil Cueto MD  Interventional Cardiology / Advance Heart Failure and Cardiac Transplant Specialist  [place] Office : [address] [place] [address] [place] [address]  Tel:   [place] Office : [address] [facility] [place] [address]  Tel: [phone]  Cell : [phone]    Pt is lying in bed comfortable not in distress, no chest pains no SOB no palpitations  	  MEDICATIONS:  aspirin enteric coated 81 milliGRAM(s) Oral daily  enoxaparin Injectable 40 milliGRAM(s) SubCutaneous every 24 hours  lisinopril 20 milliGRAM(s) Oral daily  NIFEdipine XL 90 milliGRAM(s) Oral daily  tamsulosin 0.4 milliGRAM(s) Oral at bedtime        meclizine 25 milliGRAM(s) Oral every 8 hours PRN  ondansetron Injectable 4 milliGRAM(s) IV Push every 6 hours PRN    aluminum hydroxide/magnesium hydroxide/simethicone Suspension 30 milliLiter(s) Oral every 6 hours PRN    atorvastatin 40 milliGRAM(s) Oral at bedtime  finasteride 5 milliGRAM(s) Oral daily    calcium carbonate   1250 mG (OsCal) 1 Tablet(s) Oral two times a day  cholecalciferol 1000 Unit(s) Oral daily  sodium chloride 0.9%. 1000 milliLiter(s) IV Continuous <Continuous>      PAST MEDICAL/SURGICAL HISTORY  PAST MEDICAL & SURGICAL HISTORY:  CVA (Cerebral Infarction)  Ataxia as residual    Secondary Seizure Disorder    PU (Peptic Ulcer)    Essential Hypertension    Hyperlipidemia    Vertigo    Status Post Inguinal Hernia Repair    Nephrolithiasis  s/p URS stone extraction 2012        SOCIAL HISTORY: Substance Use (street drugs): ( x ) never used  (  ) other:    FAMILY HISTORY:  Acute myocardial infarction        REVIEW OF SYSTEMS:  CONSTITUTIONAL: No fever, weight loss, or fatigue  EYES: No eye pain, visual disturbances, or discharge  ENMT:  No difficulty hearing, tinnitus, vertigo; No sinus or throat pain  BREASTS: No pain, masses, or nipple discharge  GASTROINTESTINAL: No abdominal or epigastric pain. No nausea, vomiting, or hematemesis; No diarrhea or constipation. No melena or hematochezia.  GENITOURINARY: No dysuria, frequency, hematuria, or incontinence  NEUROLOGICAL: No headaches, memory loss, loss of strength, numbness, or tremors  ENDOCRINE: No heat or cold intolerance; No hair loss  MUSCULOSKELETAL: No joint pain or swelling; No muscle, back, or extremity pain  PSYCHIATRIC: No depression, anxiety, mood swings, or difficulty sleeping  HEME/LYMPH: No easy bruising, or bleeding gums  All others negative    PHYSICAL EXAM:  T(C): 36.6 (07-04-21 @ 05:21), Max: 36.7 (07-03-21 @ 12:30)  HR: 84 (07-04-21 @ 05:21) (84 - 85)  BP: 102/57 (07-04-21 @ 05:21) (102/57 - 125/72)  RR: 18 (07-04-21 @ 05:21) (18 - 20)  SpO2: 96% (07-03-21 @ 20:52) (96% - 98%)  Wt(kg): --  I&O's Summary    03 Jul 2021 07:01  -  04 Jul 2021 07:00  --------------------------------------------------------  IN: 1670 mL / OUT: 600 mL / NET: 1070 mL          GENERAL: NAD  EYES: EOMI, PERRLA, conjunctiva and sclera clear  ENMT: No tonsillar erythema, exudates, or enlargement; Moist mucous membranes, Good dentition, No lesions  Cardiovascular: Normal S1 S2, No JVD, No murmurs, No edema  Respiratory: Lungs clear to auscultation	  Gastrointestinal:  Soft, Non-tender, + BS	  Extremities: Normal range of motion, No clubbing, cyanosis or edema  LYMPH: No lymphadenopathy noted  NERVOUS SYSTEM:  Alert & Oriented X3, Good concentration; Motor Strength 5/5 B/L upper and lower extremities; DTRs 2+ intact and symmetric                                    11.5   12.16 )-----------( 229      ( 04 Jul 2021 07:39 )             37.1     07-04    137  |  103  |  18  ----------------------------<  97  4.0   |  24  |  0.78    Ca    8.3<L>      04 Jul 2021 07:35  Phos  2.5     07-04  Mg     1.80     07-04    TPro  5.7<L>  /  Alb  2.7<L>  /  TBili  0.5  /  DBili  x   /  AST  17  /  ALT  17  /  AlkPhos  62  07-04    proBNP:   Lipid Profile:   HgA1c:   TSH:     Consultant(s) Notes Reviewed:  [x ] YES  [ ] NO    Care Discussed with Consultants/Other Providers [ x] YES  [ ] NO    Imaging Personally Reviewed independently:  [x] YES  [ ] NO    All labs, radiologic studies, vitals, orders and medications list reviewed. Patient is seen and examined at bedside. Case discussed with medical team.        
Sunil Cueto MD  Interventional Cardiology / Advance Heart Failure and Cardiac Transplant Specialist  [place] Office : [address] [place] [address] [facility] [address]  Tel:   [place] Office : [address] [facility] [place] [address]  Tel: [phone]  Cell : [phone]    Pt is lying in bed comfortable not in distress, no chest pains no SOB no palpitations  	  MEDICATIONS:  aspirin enteric coated 81 milliGRAM(s) Oral daily  enoxaparin Injectable 40 milliGRAM(s) SubCutaneous every 24 hours  lisinopril 20 milliGRAM(s) Oral daily  NIFEdipine XL 90 milliGRAM(s) Oral daily  tamsulosin 0.4 milliGRAM(s) Oral at bedtime        meclizine 25 milliGRAM(s) Oral every 8 hours PRN  ondansetron Injectable 4 milliGRAM(s) IV Push every 6 hours PRN    aluminum hydroxide/magnesium hydroxide/simethicone Suspension 30 milliLiter(s) Oral every 6 hours PRN    atorvastatin 40 milliGRAM(s) Oral at bedtime  finasteride 5 milliGRAM(s) Oral daily    calcium carbonate   1250 mG (OsCal) 1 Tablet(s) Oral two times a day  cholecalciferol 1000 Unit(s) Oral daily  sodium chloride 0.9%. 1000 milliLiter(s) IV Continuous <Continuous>      PAST MEDICAL/SURGICAL HISTORY  PAST MEDICAL & SURGICAL HISTORY:  CVA (Cerebral Infarction)  Ataxia as residual    Secondary Seizure Disorder    PU (Peptic Ulcer)    Essential Hypertension    Hyperlipidemia    Vertigo    Status Post Inguinal Hernia Repair    Nephrolithiasis  s/p URS stone extraction 2012        SOCIAL HISTORY: Substance Use (street drugs): ( x ) never used  (  ) other:    FAMILY HISTORY:  Acute myocardial infarction      PHYSICAL EXAM:  T(C): 36.7 (07-02-21 @ 12:30), Max: 36.8 (07-02-21 @ 05:20)  HR: 95 (07-02-21 @ 12:30) (88 - 95)  BP: 149/90 (07-02-21 @ 12:30) (127/60 - 154/94)  RR: 19 (07-02-21 @ 12:30) (17 - 19)  SpO2: 93% (07-02-21 @ 12:30) (92% - 97%)  Wt(kg): --  I&O's Summary    01 Jul 2021 07:01  -  02 Jul 2021 07:00  --------------------------------------------------------  IN: 998 mL / OUT: 2275 mL / NET: -1277 mL    02 Jul 2021 07:01  -  02 Jul 2021 19:28  --------------------------------------------------------  IN: 250 mL / OUT: 500 mL / NET: -250 mL            EYES: EOMI, PERRLA, conjunctiva and sclera clear  ENMT: No tonsillar erythema, exudates, or enlargement; Moist mucous membranes, Good dentition, No lesions  Cardiovascular: Normal S1 S2, No JVD, No murmurs, No edema  Respiratory: Lungs clear to auscultation	  Gastrointestinal:  Soft, Non-tender, + BS	  Extremities: no edema                                    14.0   12.43 )-----------( 252      ( 02 Jul 2021 07:30 )             43.8     07-02    138  |  103  |  15  ----------------------------<  134<H>  4.1   |  24  |  0.73    Ca    8.5      02 Jul 2021 07:30  Phos  1.6     07-02  Mg     1.90     07-02    TPro  6.7  /  Alb  3.5  /  TBili  0.8  /  DBili  x   /  AST  15  /  ALT  12  /  AlkPhos  75  07-02    proBNP:   Lipid Profile:   HgA1c:   TSH:     Consultant(s) Notes Reviewed:  [x ] YES  [ ] NO    Care Discussed with Consultants/Other Providers [ x] YES  [ ] NO    Imaging Personally Reviewed independently:  [x] YES  [ ] NO    All labs, radiologic studies, vitals, orders and medications list reviewed. Patient is seen and examined at bedside. Case discussed with medical team.

## 2021-07-06 NOTE — PROGRESS NOTE ADULT - REASON FOR ADMISSION
electrolytes disturbance, generalized weakness

## 2021-07-06 NOTE — PROGRESS NOTE ADULT - PROVIDER SPECIALTY LIST ADULT
Internal Medicine
Internal Medicine
Cardiology
Infectious Disease
Infectious Disease
Cardiology
Internal Medicine

## 2021-07-06 NOTE — DISCHARGE NOTE PROVIDER - NSDCMRMEDTOKEN_GEN_ALL_CORE_FT
aspirin 81 mg oral tablet, chewable: 1 tab(s) orally once a day  atorvastatin 40 mg oral tablet: 1 tab(s) orally once a day  dutasteride 0.5 mg oral capsule: 1 cap(s) orally once a day  lisinopril 20 mg oral tablet: 1 tab(s) orally once a day  NIFEdipine 90 mg oral tablet, extended release: 1 tab(s) orally once a day  pantoprazole 40 mg oral delayed release tablet: 1 tab(s) orally once a day  silodosin 8 mg oral capsule: 1 cap(s) orally once a day   aspirin 81 mg oral tablet, chewable: 1 tab(s) orally once a day  atorvastatin 40 mg oral tablet: 1 tab(s) orally once a day  calcium carbonate 1250 mg (500 mg elemental calcium) oral tablet: 1 tab(s) orally 2 times a day  cholecalciferol oral tablet: 1000 unit(s) orally once a day  finasteride 5 mg oral tablet: 1 tab(s) orally once a day  lisinopril 20 mg oral tablet: 1 tab(s) orally once a day  meclizine 25 mg oral tablet: 1 tab(s) orally every 8 hours, As needed, Dizziness  NIFEdipine 90 mg oral tablet, extended release: 1 tab(s) orally once a day  pantoprazole 40 mg oral delayed release tablet: 1 tab(s) orally once a day  tamsulosin 0.4 mg oral capsule: 1 cap(s) orally once a day (at bedtime)

## 2021-07-06 NOTE — DISCHARGE NOTE PROVIDER - HOSPITAL COURSE
81 y/o male with PMHx HTN, HLD, CVA (ataxia as residual) and vertigo who presented with complaints of generalized weakness, nausea, vomiting and diarrhea x2 days.    1. Dizziness  - EKG: NSR non-specific STT changes, Tele: NSR PACs  -describes as lightheaded sensation with orthostatics borderline on June 29th, now resolved.   - Echo done outpatient 3 months ago reported normal  - carotid dopplers showed mild heterogenous plaque noted within the proximal right and left internal carotid arteries, consistent with 16-49% stenoses in both proximal vessels.  No hemodynamically  significant stenoses noted.   - found to be hypocalcemic, hypokalemic and hypomagnesemic, supplemented and now improved.     2. HTN  -controlled  -c/w lisinopril and nifedipine    3. N/V   - Noted to have leukocytosis, possibly reactive to acute gastroenteritis. No focal symptoms, CXR with atelectasis. Non-toxic and monitored off abx with white count improving. Lipase normal, benign abdominal exam.     Patient seen and evaluated, no acute somatic complaints. Reviewed discharge medications with patient; All new medications requiring new prescriptions were sent to the pharmacy of patient's choice. Reviewed need for prescription for previous home medications and new prescriptions sent if requested. Medically cleared/stable for discharge as per   with close outpatient follow up within 1-2 weeks of discharge. Patient understands and agrees with plan of care.

## 2021-07-06 NOTE — DIETITIAN INITIAL EVALUATION ADULT. - OTHER INFO
79 y/o male admitted with dx of hypocalcemia. Visited with pt to obtain nutrition hx. Pt said that he had "a lot of problems" with GI distress PTA. He experienced N/V/D for several days associated with vertigo. He denies food allergies nor issues with chewing/swallowing. GI distress has since resolved during his hospital stay. He is now consuming at least 90% of his meals. Food preferences taken and menu alternatives discussed. Pt had no signif weight change PTA. He said that he knows he needs to "lose a few pounds" and tries to walk daily if able. He "watches" his salt and fat consumption, but said that he "goes off the diet" on occasion. Reinforced general healthful eating and basic heart healthy (DASH/TLC) diet principles. Discussed importance of lifestyle changes, encouraged low sodium meal prep, heart healthy food choices and appropriate food choices when dining out. Pt with good understanding of instruction provided and was able to teach back 3 diet principles. Pt without additional nutrition related issues or concerns at this time. RDN services to remain available as needed.

## 2021-07-06 NOTE — PROGRESS NOTE ADULT - NSICDXPILOT_GEN_ALL_CORE
[place]

## 2021-07-06 NOTE — DIETITIAN INITIAL EVALUATION ADULT. - PERTINENT MEDS FT
MEDICATIONS  (STANDING):  aspirin enteric coated 81 milliGRAM(s) Oral daily  atorvastatin 40 milliGRAM(s) Oral at bedtime  calcium carbonate   1250 mG (OsCal) 1 Tablet(s) Oral two times a day  cholecalciferol 1000 Unit(s) Oral daily  enoxaparin Injectable 40 milliGRAM(s) SubCutaneous every 24 hours  finasteride 5 milliGRAM(s) Oral daily  lisinopril 20 milliGRAM(s) Oral daily  NIFEdipine XL 90 milliGRAM(s) Oral daily  potassium chloride  10 mEq/100 mL IVPB 10 milliEquivalent(s) IV Intermittent every 1 hour  senna 2 Tablet(s) Oral at bedtime  sodium chloride 0.9%. 1000 milliLiter(s) (75 mL/Hr) IV Continuous <Continuous>  tamsulosin 0.4 milliGRAM(s) Oral at bedtime

## 2021-07-06 NOTE — DISCHARGE NOTE PROVIDER - NSDCCPCAREPLAN_GEN_ALL_CORE_FT
PRINCIPAL DISCHARGE DIAGNOSIS  Diagnosis: Generalized weakness  Assessment and Plan of Treatment: You presented with weakeness and you were found to have several electrolyte abnormalities. We have supplemented your electrolytes and they are now improved. You had an echo 3 months ago that was normal. Your carotid ultrasound did not show significant narrowing of your vessels. Your BP is controlled. There was no further inpatient workup needed.   Follow up with PCP within 1-2 weeks of discharge.         SECONDARY DISCHARGE DIAGNOSES  Diagnosis: Nausea, vomiting and diarrhea  Assessment and Plan of Treatment: You had vomiting and diarrhea, likely gastroenteritis. You did not have any signs of infection and you did not have an infection in your blood. Your symptoms have improved.   Follow up with PCP within 1-2 weeks of discharge.

## 2021-07-07 PROBLEM — R42 DIZZINESS AND GIDDINESS: Chronic | Status: ACTIVE | Noted: 2021-06-28

## 2021-07-08 LAB
CULTURE RESULTS: SIGNIFICANT CHANGE UP
CULTURE RESULTS: SIGNIFICANT CHANGE UP
SPECIMEN SOURCE: SIGNIFICANT CHANGE UP
SPECIMEN SOURCE: SIGNIFICANT CHANGE UP

## 2021-07-25 ENCOUNTER — INPATIENT (INPATIENT)
Facility: HOSPITAL | Age: 81
LOS: 3 days | Discharge: HOME | End: 2021-07-29
Attending: HOSPITALIST | Admitting: HOSPITALIST
Payer: MEDICARE

## 2021-07-25 VITALS
HEIGHT: 70 IN | SYSTOLIC BLOOD PRESSURE: 104 MMHG | RESPIRATION RATE: 16 BRPM | TEMPERATURE: 99 F | HEART RATE: 82 BPM | OXYGEN SATURATION: 99 % | DIASTOLIC BLOOD PRESSURE: 69 MMHG

## 2021-07-25 DIAGNOSIS — N20.0 CALCULUS OF KIDNEY: Chronic | ICD-10-CM

## 2021-07-25 NOTE — ED ADULT TRIAGE NOTE - CHIEF COMPLAINT QUOTE
pt brought from home by EMS c/o N/V/D x 3 days worsening today, unable to tolerate PO intake today, previously eval'd for similar, as per EMS pt unable to tolerate PO medication today, HX HTN HLD

## 2021-07-26 DIAGNOSIS — R19.7 DIARRHEA, UNSPECIFIED: ICD-10-CM

## 2021-07-26 LAB
ALBUMIN SERPL ELPH-MCNC: 3.7 G/DL — SIGNIFICANT CHANGE UP (ref 3.3–5)
ALBUMIN SERPL ELPH-MCNC: 4.3 G/DL — SIGNIFICANT CHANGE UP (ref 3.3–5)
ALP SERPL-CCNC: 107 U/L — SIGNIFICANT CHANGE UP (ref 40–120)
ALP SERPL-CCNC: 91 U/L — SIGNIFICANT CHANGE UP (ref 40–120)
ALT FLD-CCNC: 15 U/L — SIGNIFICANT CHANGE UP (ref 4–41)
ALT FLD-CCNC: 18 U/L — SIGNIFICANT CHANGE UP (ref 4–41)
ANION GAP SERPL CALC-SCNC: 15 MMOL/L — HIGH (ref 7–14)
ANION GAP SERPL CALC-SCNC: 19 MMOL/L — HIGH (ref 7–14)
ANION GAP SERPL CALC-SCNC: 20 MMOL/L — HIGH (ref 7–14)
APPEARANCE UR: CLEAR — SIGNIFICANT CHANGE UP
AST SERPL-CCNC: 15 U/L — SIGNIFICANT CHANGE UP (ref 4–40)
AST SERPL-CCNC: 16 U/L — SIGNIFICANT CHANGE UP (ref 4–40)
BACTERIA # UR AUTO: NEGATIVE — SIGNIFICANT CHANGE UP
BASOPHILS # BLD AUTO: 0.02 K/UL — SIGNIFICANT CHANGE UP (ref 0–0.2)
BASOPHILS NFR BLD AUTO: 0.2 % — SIGNIFICANT CHANGE UP (ref 0–2)
BILIRUB SERPL-MCNC: 1.2 MG/DL — SIGNIFICANT CHANGE UP (ref 0.2–1.2)
BILIRUB SERPL-MCNC: 1.6 MG/DL — HIGH (ref 0.2–1.2)
BILIRUB UR-MCNC: NEGATIVE — SIGNIFICANT CHANGE UP
BUN SERPL-MCNC: 14 MG/DL — SIGNIFICANT CHANGE UP (ref 7–23)
BUN SERPL-MCNC: 14 MG/DL — SIGNIFICANT CHANGE UP (ref 7–23)
BUN SERPL-MCNC: 17 MG/DL — SIGNIFICANT CHANGE UP (ref 7–23)
CA-I BLD-SCNC: 0.94 MMOL/L — LOW (ref 1.15–1.29)
CALCIUM SERPL-MCNC: 7.4 MG/DL — LOW (ref 8.4–10.5)
CALCIUM SERPL-MCNC: 7.8 MG/DL — LOW (ref 8.4–10.5)
CALCIUM SERPL-MCNC: 7.9 MG/DL — LOW (ref 8.4–10.5)
CHLORIDE SERPL-SCNC: 102 MMOL/L — SIGNIFICANT CHANGE UP (ref 98–107)
CHLORIDE SERPL-SCNC: 96 MMOL/L — LOW (ref 98–107)
CHLORIDE SERPL-SCNC: 99 MMOL/L — SIGNIFICANT CHANGE UP (ref 98–107)
CO2 SERPL-SCNC: 23 MMOL/L — SIGNIFICANT CHANGE UP (ref 22–31)
CO2 SERPL-SCNC: 25 MMOL/L — SIGNIFICANT CHANGE UP (ref 22–31)
CO2 SERPL-SCNC: 26 MMOL/L — SIGNIFICANT CHANGE UP (ref 22–31)
COLOR SPEC: YELLOW — SIGNIFICANT CHANGE UP
CREAT SERPL-MCNC: 0.99 MG/DL — SIGNIFICANT CHANGE UP (ref 0.5–1.3)
CREAT SERPL-MCNC: 1.02 MG/DL — SIGNIFICANT CHANGE UP (ref 0.5–1.3)
CREAT SERPL-MCNC: 1.08 MG/DL — SIGNIFICANT CHANGE UP (ref 0.5–1.3)
DIFF PNL FLD: NEGATIVE — SIGNIFICANT CHANGE UP
EOSINOPHIL # BLD AUTO: 0.03 K/UL — SIGNIFICANT CHANGE UP (ref 0–0.5)
EOSINOPHIL NFR BLD AUTO: 0.3 % — SIGNIFICANT CHANGE UP (ref 0–6)
EPI CELLS # UR: SIGNIFICANT CHANGE UP /HPF (ref 0–5)
GLUCOSE SERPL-MCNC: 111 MG/DL — HIGH (ref 70–99)
GLUCOSE SERPL-MCNC: 80 MG/DL — SIGNIFICANT CHANGE UP (ref 70–99)
GLUCOSE SERPL-MCNC: 92 MG/DL — SIGNIFICANT CHANGE UP (ref 70–99)
GLUCOSE UR QL: NEGATIVE — SIGNIFICANT CHANGE UP
HCT VFR BLD CALC: 42.5 % — SIGNIFICANT CHANGE UP (ref 39–50)
HCT VFR BLD CALC: 46.4 % — SIGNIFICANT CHANGE UP (ref 39–50)
HGB BLD-MCNC: 14 G/DL — SIGNIFICANT CHANGE UP (ref 13–17)
HGB BLD-MCNC: 15.5 G/DL — SIGNIFICANT CHANGE UP (ref 13–17)
HYALINE CASTS # UR AUTO: SIGNIFICANT CHANGE UP /LPF (ref 0–7)
IANC: 6.8 K/UL — SIGNIFICANT CHANGE UP (ref 1.5–8.5)
IMM GRANULOCYTES NFR BLD AUTO: 0.3 % — SIGNIFICANT CHANGE UP (ref 0–1.5)
KETONES UR-MCNC: ABNORMAL
LEUKOCYTE ESTERASE UR-ACNC: NEGATIVE — SIGNIFICANT CHANGE UP
LIDOCAIN IGE QN: 39 U/L — SIGNIFICANT CHANGE UP (ref 7–60)
LYMPHOCYTES # BLD AUTO: 1.14 K/UL — SIGNIFICANT CHANGE UP (ref 1–3.3)
LYMPHOCYTES # BLD AUTO: 13.1 % — SIGNIFICANT CHANGE UP (ref 13–44)
MAGNESIUM SERPL-MCNC: 0.7 MG/DL — CRITICAL LOW (ref 1.6–2.6)
MAGNESIUM SERPL-MCNC: 1.3 MG/DL — LOW (ref 1.6–2.6)
MAGNESIUM SERPL-MCNC: <0.5 MG/DL — CRITICAL LOW (ref 1.6–2.6)
MCHC RBC-ENTMCNC: 28.8 PG — SIGNIFICANT CHANGE UP (ref 27–34)
MCHC RBC-ENTMCNC: 29.2 PG — SIGNIFICANT CHANGE UP (ref 27–34)
MCHC RBC-ENTMCNC: 32.9 GM/DL — SIGNIFICANT CHANGE UP (ref 32–36)
MCHC RBC-ENTMCNC: 33.4 GM/DL — SIGNIFICANT CHANGE UP (ref 32–36)
MCV RBC AUTO: 87.4 FL — SIGNIFICANT CHANGE UP (ref 80–100)
MCV RBC AUTO: 87.5 FL — SIGNIFICANT CHANGE UP (ref 80–100)
MONOCYTES # BLD AUTO: 0.69 K/UL — SIGNIFICANT CHANGE UP (ref 0–0.9)
MONOCYTES NFR BLD AUTO: 7.9 % — SIGNIFICANT CHANGE UP (ref 2–14)
NEUTROPHILS # BLD AUTO: 6.8 K/UL — SIGNIFICANT CHANGE UP (ref 1.8–7.4)
NEUTROPHILS NFR BLD AUTO: 78.2 % — HIGH (ref 43–77)
NITRITE UR-MCNC: NEGATIVE — SIGNIFICANT CHANGE UP
NRBC # BLD: 0 /100 WBCS — SIGNIFICANT CHANGE UP
NRBC # BLD: 0 /100 WBCS — SIGNIFICANT CHANGE UP
NRBC # FLD: 0 K/UL — SIGNIFICANT CHANGE UP
NRBC # FLD: 0 K/UL — SIGNIFICANT CHANGE UP
PH UR: 6.5 — SIGNIFICANT CHANGE UP (ref 5–8)
PHOSPHATE SERPL-MCNC: 3.9 MG/DL — SIGNIFICANT CHANGE UP (ref 2.5–4.5)
PHOSPHATE SERPL-MCNC: 4.1 MG/DL — SIGNIFICANT CHANGE UP (ref 2.5–4.5)
PHOSPHATE SERPL-MCNC: 5 MG/DL — HIGH (ref 2.5–4.5)
PLATELET # BLD AUTO: 239 K/UL — SIGNIFICANT CHANGE UP (ref 150–400)
PLATELET # BLD AUTO: 265 K/UL — SIGNIFICANT CHANGE UP (ref 150–400)
POTASSIUM SERPL-MCNC: 2.9 MMOL/L — CRITICAL LOW (ref 3.5–5.3)
POTASSIUM SERPL-MCNC: 3.5 MMOL/L — SIGNIFICANT CHANGE UP (ref 3.5–5.3)
POTASSIUM SERPL-MCNC: 3.6 MMOL/L — SIGNIFICANT CHANGE UP (ref 3.5–5.3)
POTASSIUM SERPL-SCNC: 2.9 MMOL/L — CRITICAL LOW (ref 3.5–5.3)
POTASSIUM SERPL-SCNC: 3.5 MMOL/L — SIGNIFICANT CHANGE UP (ref 3.5–5.3)
POTASSIUM SERPL-SCNC: 3.6 MMOL/L — SIGNIFICANT CHANGE UP (ref 3.5–5.3)
PROT SERPL-MCNC: 6.6 G/DL — SIGNIFICANT CHANGE UP (ref 6–8.3)
PROT SERPL-MCNC: 7.8 G/DL — SIGNIFICANT CHANGE UP (ref 6–8.3)
PROT UR-MCNC: ABNORMAL
RBC # BLD: 4.86 M/UL — SIGNIFICANT CHANGE UP (ref 4.2–5.8)
RBC # BLD: 5.3 M/UL — SIGNIFICANT CHANGE UP (ref 4.2–5.8)
RBC # FLD: 13.1 % — SIGNIFICANT CHANGE UP (ref 10.3–14.5)
RBC # FLD: 13.2 % — SIGNIFICANT CHANGE UP (ref 10.3–14.5)
RBC CASTS # UR COMP ASSIST: <5 /HPF — HIGH (ref 0–4)
SARS-COV-2 RNA SPEC QL NAA+PROBE: SIGNIFICANT CHANGE UP
SODIUM SERPL-SCNC: 141 MMOL/L — SIGNIFICANT CHANGE UP (ref 135–145)
SODIUM SERPL-SCNC: 141 MMOL/L — SIGNIFICANT CHANGE UP (ref 135–145)
SODIUM SERPL-SCNC: 143 MMOL/L — SIGNIFICANT CHANGE UP (ref 135–145)
SP GR SPEC: 1.02 — SIGNIFICANT CHANGE UP (ref 1.01–1.02)
UROBILINOGEN FLD QL: SIGNIFICANT CHANGE UP
WBC # BLD: 8.3 K/UL — SIGNIFICANT CHANGE UP (ref 3.8–10.5)
WBC # BLD: 8.71 K/UL — SIGNIFICANT CHANGE UP (ref 3.8–10.5)
WBC # FLD AUTO: 8.3 K/UL — SIGNIFICANT CHANGE UP (ref 3.8–10.5)
WBC # FLD AUTO: 8.71 K/UL — SIGNIFICANT CHANGE UP (ref 3.8–10.5)
WBC UR QL: <5 /HPF — SIGNIFICANT CHANGE UP (ref 0–5)

## 2021-07-26 PROCEDURE — 74177 CT ABD & PELVIS W/CONTRAST: CPT | Mod: 26

## 2021-07-26 PROCEDURE — 71045 X-RAY EXAM CHEST 1 VIEW: CPT | Mod: 26

## 2021-07-26 PROCEDURE — 99223 1ST HOSP IP/OBS HIGH 75: CPT

## 2021-07-26 RX ORDER — LISINOPRIL 2.5 MG/1
20 TABLET ORAL DAILY
Refills: 0 | Status: DISCONTINUED | OUTPATIENT
Start: 2021-07-26 | End: 2021-07-29

## 2021-07-26 RX ORDER — POTASSIUM CHLORIDE 20 MEQ
40 PACKET (EA) ORAL ONCE
Refills: 0 | Status: COMPLETED | OUTPATIENT
Start: 2021-07-26 | End: 2021-07-26

## 2021-07-26 RX ORDER — MAGNESIUM SULFATE 500 MG/ML
2 VIAL (ML) INJECTION ONCE
Refills: 0 | Status: COMPLETED | OUTPATIENT
Start: 2021-07-26 | End: 2021-07-26

## 2021-07-26 RX ORDER — CALCIUM GLUCONATE 100 MG/ML
1 VIAL (ML) INTRAVENOUS ONCE
Refills: 0 | Status: COMPLETED | OUTPATIENT
Start: 2021-07-26 | End: 2021-07-26

## 2021-07-26 RX ORDER — ATORVASTATIN CALCIUM 80 MG/1
40 TABLET, FILM COATED ORAL AT BEDTIME
Refills: 0 | Status: DISCONTINUED | OUTPATIENT
Start: 2021-07-26 | End: 2021-07-29

## 2021-07-26 RX ORDER — MAGNESIUM OXIDE 400 MG ORAL TABLET 241.3 MG
400 TABLET ORAL
Refills: 0 | Status: DISCONTINUED | OUTPATIENT
Start: 2021-07-26 | End: 2021-07-27

## 2021-07-26 RX ORDER — PANTOPRAZOLE SODIUM 20 MG/1
40 TABLET, DELAYED RELEASE ORAL
Refills: 0 | Status: DISCONTINUED | OUTPATIENT
Start: 2021-07-26 | End: 2021-07-26

## 2021-07-26 RX ORDER — SODIUM CHLORIDE 9 MG/ML
1000 INJECTION, SOLUTION INTRAVENOUS ONCE
Refills: 0 | Status: COMPLETED | OUTPATIENT
Start: 2021-07-26 | End: 2021-07-26

## 2021-07-26 RX ORDER — MIRABEGRON 50 MG/1
1 TABLET, EXTENDED RELEASE ORAL
Qty: 0 | Refills: 0 | DISCHARGE

## 2021-07-26 RX ORDER — ONDANSETRON 8 MG/1
4 TABLET, FILM COATED ORAL ONCE
Refills: 0 | Status: COMPLETED | OUTPATIENT
Start: 2021-07-26 | End: 2021-07-26

## 2021-07-26 RX ORDER — ASPIRIN/CALCIUM CARB/MAGNESIUM 324 MG
81 TABLET ORAL DAILY
Refills: 0 | Status: DISCONTINUED | OUTPATIENT
Start: 2021-07-26 | End: 2021-07-29

## 2021-07-26 RX ORDER — MECLIZINE HCL 12.5 MG
25 TABLET ORAL EVERY 8 HOURS
Refills: 0 | Status: DISCONTINUED | OUTPATIENT
Start: 2021-07-26 | End: 2021-07-29

## 2021-07-26 RX ORDER — SODIUM CHLORIDE 9 MG/ML
1000 INJECTION, SOLUTION INTRAVENOUS
Refills: 0 | Status: DISCONTINUED | OUTPATIENT
Start: 2021-07-26 | End: 2021-07-27

## 2021-07-26 RX ORDER — CALCIUM CARBONATE 500(1250)
1 TABLET ORAL
Refills: 0 | Status: DISCONTINUED | OUTPATIENT
Start: 2021-07-26 | End: 2021-07-27

## 2021-07-26 RX ORDER — CHOLECALCIFEROL (VITAMIN D3) 125 MCG
1000 CAPSULE ORAL DAILY
Refills: 0 | Status: DISCONTINUED | OUTPATIENT
Start: 2021-07-26 | End: 2021-07-29

## 2021-07-26 RX ORDER — ATORVASTATIN CALCIUM 80 MG/1
1 TABLET, FILM COATED ORAL
Qty: 0 | Refills: 0 | DISCHARGE

## 2021-07-26 RX ORDER — MIRABEGRON 50 MG/1
0 TABLET, EXTENDED RELEASE ORAL
Qty: 0 | Refills: 0 | DISCHARGE

## 2021-07-26 RX ORDER — FINASTERIDE 5 MG/1
5 TABLET, FILM COATED ORAL DAILY
Refills: 0 | Status: DISCONTINUED | OUTPATIENT
Start: 2021-07-26 | End: 2021-07-29

## 2021-07-26 RX ORDER — POTASSIUM CHLORIDE 20 MEQ
10 PACKET (EA) ORAL
Refills: 0 | Status: COMPLETED | OUTPATIENT
Start: 2021-07-26 | End: 2021-07-26

## 2021-07-26 RX ORDER — TAMSULOSIN HYDROCHLORIDE 0.4 MG/1
0.4 CAPSULE ORAL AT BEDTIME
Refills: 0 | Status: DISCONTINUED | OUTPATIENT
Start: 2021-07-26 | End: 2021-07-29

## 2021-07-26 RX ORDER — NIFEDIPINE 30 MG
90 TABLET, EXTENDED RELEASE 24 HR ORAL DAILY
Refills: 0 | Status: DISCONTINUED | OUTPATIENT
Start: 2021-07-26 | End: 2021-07-29

## 2021-07-26 RX ORDER — ASPIRIN/CALCIUM CARB/MAGNESIUM 324 MG
1 TABLET ORAL
Qty: 0 | Refills: 0 | DISCHARGE

## 2021-07-26 RX ORDER — NIFEDIPINE 30 MG
1 TABLET, EXTENDED RELEASE 24 HR ORAL
Qty: 0 | Refills: 0 | DISCHARGE

## 2021-07-26 RX ORDER — ENOXAPARIN SODIUM 100 MG/ML
40 INJECTION SUBCUTANEOUS EVERY 24 HOURS
Refills: 0 | Status: DISCONTINUED | OUTPATIENT
Start: 2021-07-26 | End: 2021-07-29

## 2021-07-26 RX ORDER — MAGNESIUM SULFATE 500 MG/ML
2 VIAL (ML) INJECTION ONCE
Refills: 0 | Status: DISCONTINUED | OUTPATIENT
Start: 2021-07-26 | End: 2021-07-26

## 2021-07-26 RX ADMIN — SODIUM CHLORIDE 1000 MILLILITER(S): 9 INJECTION, SOLUTION INTRAVENOUS at 03:00

## 2021-07-26 RX ADMIN — FINASTERIDE 5 MILLIGRAM(S): 5 TABLET, FILM COATED ORAL at 12:32

## 2021-07-26 RX ADMIN — Medication 10 MILLIEQUIVALENT(S): at 05:00

## 2021-07-26 RX ADMIN — Medication 50 GRAM(S): at 18:02

## 2021-07-26 RX ADMIN — Medication 100 MILLIEQUIVALENT(S): at 03:21

## 2021-07-26 RX ADMIN — SODIUM CHLORIDE 100 MILLILITER(S): 9 INJECTION, SOLUTION INTRAVENOUS at 10:10

## 2021-07-26 RX ADMIN — Medication 1000 UNIT(S): at 12:32

## 2021-07-26 RX ADMIN — Medication 50 GRAM(S): at 10:18

## 2021-07-26 RX ADMIN — Medication 100 MILLIEQUIVALENT(S): at 05:02

## 2021-07-26 RX ADMIN — ATORVASTATIN CALCIUM 40 MILLIGRAM(S): 80 TABLET, FILM COATED ORAL at 21:25

## 2021-07-26 RX ADMIN — ONDANSETRON 4 MILLIGRAM(S): 8 TABLET, FILM COATED ORAL at 02:34

## 2021-07-26 RX ADMIN — Medication 50 GRAM(S): at 02:35

## 2021-07-26 RX ADMIN — MAGNESIUM OXIDE 400 MG ORAL TABLET 400 MILLIGRAM(S): 241.3 TABLET ORAL at 12:31

## 2021-07-26 RX ADMIN — Medication 100 GRAM(S): at 08:03

## 2021-07-26 RX ADMIN — SODIUM CHLORIDE 1000 MILLILITER(S): 9 INJECTION, SOLUTION INTRAVENOUS at 00:59

## 2021-07-26 RX ADMIN — Medication 81 MILLIGRAM(S): at 12:31

## 2021-07-26 RX ADMIN — MAGNESIUM OXIDE 400 MG ORAL TABLET 400 MILLIGRAM(S): 241.3 TABLET ORAL at 17:21

## 2021-07-26 RX ADMIN — Medication 40 MILLIEQUIVALENT(S): at 02:37

## 2021-07-26 RX ADMIN — ENOXAPARIN SODIUM 40 MILLIGRAM(S): 100 INJECTION SUBCUTANEOUS at 12:30

## 2021-07-26 RX ADMIN — Medication 1 TABLET(S): at 18:50

## 2021-07-26 RX ADMIN — Medication 2 GRAM(S): at 03:20

## 2021-07-26 RX ADMIN — TAMSULOSIN HYDROCHLORIDE 0.4 MILLIGRAM(S): 0.4 CAPSULE ORAL at 21:25

## 2021-07-26 RX ADMIN — Medication 100 MILLIEQUIVALENT(S): at 06:10

## 2021-07-26 RX ADMIN — MAGNESIUM OXIDE 400 MG ORAL TABLET 400 MILLIGRAM(S): 241.3 TABLET ORAL at 10:11

## 2021-07-26 NOTE — H&P ADULT - ASSESSMENT
81 yo male PMHx HTN, HLD, CVA (ataxia as residual,) and Vertigo p/w generalized weakness, nausea, vomiting and diarrhea x4 days, found to be hypocalcemic, hypokalemic and hypomagnesemic, admitted to tele for electrolyte disturbance with nausea, vomiting and diarrhea.

## 2021-07-26 NOTE — PHYSICAL THERAPY INITIAL EVALUATION ADULT - LEVEL OF INDEPENDENCE: STAND/SIT, REHAB EVAL
PT STATES HE STILL FEELS LIKE THERE IS SOMETHING STUCK IN HIS THROAT. PT 
REPORTS EATING TRITIP EARLIER TODAY AND THAT IS WHAT IS STUCK IN HIS THROAT. 
ERMD MADE AWARE. contact guard

## 2021-07-26 NOTE — ED ADULT NURSE NOTE - OBJECTIVE STATEMENT
Pt arrives to room 19 c/o N/V/D for 5 days worsening the last 2 days.  MD at bedside assessing pt.  Pt denies pain.  Pt family states pt experienced this a few weeks ago as well.  20g iv placed to RT AC, labs drawn.  Family at bedside.

## 2021-07-26 NOTE — ED ADULT NURSE NOTE - INTERVENTIONS DEFINITIONS
[place] to call system/Call bell, personal items and telephone within reach/Instruct patient to call for assistance/Room bathroom lighting operational/Non-slip footwear when patient is off stretcher/Physically safe environment: no spills, clutter or unnecessary equipment/Stretcher in lowest position, wheels locked, appropriate side rails in place/Provide visual cue, wrist band, yellow gown, etc./Monitor for mental status changes and reorient to person, place, and time/Reinforce activity limits and safety measures with patient and family

## 2021-07-26 NOTE — ED ADULT NURSE REASSESSMENT NOTE - NS ED NURSE REASSESS COMMENT FT1
Pt reports bowel movement.  Unable to obtain viable sample for lab.  Pt changed by male HERRERA Tech and RN.  Pt cleaned, New linens placed.  Pt placed in a diaper.  Skin dry and intact.

## 2021-07-26 NOTE — ED PROVIDER NOTE - CLINICAL SUMMARY MEDICAL DECISION MAKING FREE TEXT BOX
79 yo with HTN and CVA presenting with intractable NV and some soft stools.  Last visit thought to be related to gastro but had no abd imaging.  Will get CT at this time.  With gait problems and incontinence NPH also considered but had a CT a few weeks ago that was normal so unlikely to have progressed that quickly.  Will need to check lytes and If significantly off will again need repletion.  Pt is having significant GI loss and at his age will likely need to be admitted again.  Pt is adamant that he not be admitted again to Dr Garber from medicine.

## 2021-07-26 NOTE — ED PROVIDER NOTE - OBJECTIVE STATEMENT
81 yo with CVA HTN presenting with multiple episodes of NBNB NV and soft stools over the last few days.  Not able to tolerate anything PO at this time including his medications.  There is no associated abd pains.  Per wife she is reporting some problems with gait as well as control over his urine and bowels.  had a very similar episode about 3 weeks ago requiring admission.  Work up showed some electrolyte problems as well as a negative CT scan of head.  Thought to be related to gastro and was able to be dc.  Was well until this started again a few days ago.

## 2021-07-26 NOTE — ED PROVIDER NOTE - CARE PLAN
Principal Discharge DX:	Diarrhea  Secondary Diagnosis:	Nausea and vomiting  Secondary Diagnosis:	Hypomagnesemia  Secondary Diagnosis:	Hypokalemia

## 2021-07-26 NOTE — H&P ADULT - PROBLEM SELECTOR PLAN 2
Nausea and vomiting subsided since pt in hospital. Now pt reports he is hungry, diarrhea still there.  -continue IV LR @ 100cc/hr for now  -send stool for CDiff. Recent GI PCR was neg.

## 2021-07-26 NOTE — H&P ADULT - PROBLEM SELECTOR PLAN 1
Electrolyte disturbance in a setting of nausea, vomiting and diarrhea with possible medication side effect contributing - in the past was on PPI which caused hypomagnesemia and as a result hypocalcemia.   -d/c'd protonix (pt denies any GI bleeding or GERD symptoms.)  -tele monitor for arrhythmias  -serial EKGs  -Continue repleting Potassium, Calcium and Magnesium.  -rechecking BMP for electrolytes now including ionized calcium. Recheck PTH and Vitamin D level (added on to labs)  -monitor phosphorus

## 2021-07-26 NOTE — H&P ADULT - OPHTHALMOLOGIC
Health Maintenance Due   Topic Date Due   • DTaP/Tdap/Td Vaccine (1 - Tdap) 11/11/1955   • Hepatitis B Vaccine (1 of 3 - Risk 3-dose series) 11/11/1963   • Shingles Vaccine (1 of 2) 11/11/1994   • Medicare Wellness 65+  05/20/2020   • Depression Screening  05/20/2020   • Colorectal Cancer Screening-Colonoscopy  06/06/2020       Patient is due for topics as listed above but is not proceeding with Immunization(s) Dtap/Tdap/Td, Hep B and Shingles, Colorectal Cancer Screening: Colonoscopy and MWV (Medicare Wellness Visit) at this time.          negative

## 2021-07-26 NOTE — ED PROVIDER NOTE - PMH
CVA (Cerebral Infarction)  Ataxia as residual  Essential Hypertension    Hyperlipidemia    PU (Peptic Ulcer)    Secondary Seizure Disorder    Vertigo

## 2021-07-26 NOTE — H&P ADULT - ATTENDING COMMENTS
Pt seen and examined. I discussed the case with CORAL Angulo and agree.  In brief,  79 y/o male with pmhx of HTN, kidney stones, prior CVA, BPH and possible dementia who presents to the ER with 3 days of nausea, vomiting and diarrhea. Pt found to be hypocalcemic, hypomagnesemic and hyperphosphatemic. Pt's vitals stable, exam overall unremarkable, RRR, clear lungs, soft abdomen and no peripheral edema. Pt reports feeling improved after fluids in the ER. No further vomiting or diarrhea.  #nausea/vomiting/ - resolved, un-clear etiology: Vertigo given dizziness vs. 2/2 mild gastroenteritis (however, wife without symptoms vs. secondary  to electrolyte deficinecy   - lipase wnl. abd soft  - Meclizine PRN  - Replete electrolytes and CTM   #Diarrhea; F/U Stool studies. If negative, can consider imodium   #hypocalcemia- pt with history of this, likely related to hypomagnesemia and vit D deficiency. continue calcium supplementation and and check PTH, vitD levels. trend bmp  #hypomag - in the past attributed to PPI, discontinued PPI.  Also likely exacerbated in the setting of diarrhea over the last two days. improving with supplementation, continue to replete and trend  #Hyperphos- unclear, , possibly from volume contraction   Recheck phos in AM.  May need to start phos binder if still rising. continue iv fluids .

## 2021-07-26 NOTE — H&P ADULT - GENERAL
details… Hydroxyzine Counseling: Patient advised that the medication is sedating and not to drive a car after taking this medication.  Patient informed of potential adverse effects including but not limited to dry mouth, urinary retention, and blurry vision.  The patient verbalized understanding of the proper use and possible adverse effects of hydroxyzine.  All of the patient's questions and concerns were addressed.

## 2021-07-26 NOTE — H&P ADULT - HISTORY OF PRESENT ILLNESS
81 yo male PMHx HTN, HLD, CVA (ataxia as residual,) and Vertigo p/w recurrent generalized weakness, nausea, vomiting and diarrhea x4 days (since Friday.) Friday pt developed these symptoms, accompanied by anorexia, not eating or drinking. Pt vomits about 2-3 episodes/day clear colored emesis. Diarrhea described as watery yellow stool, non-foul smelling. Furthermore, pt developed generalized weakness, dizziness and lightheadedness. EMS was called and pt [facility]. Pt denies fever, chills, chest pain, sob, palpitations, diaphoresis, abdominal pain, headache, syncope or sick contacts.    In ED pt found to be hypokalemic and hypomagnesemic.  He was given IV LR x1L, Mg Sulfate IV 2g and IV KCl  In general pt is poor historian, doesn't know his home meds. Meds and collateral obtained from his wife, Adwoa.

## 2021-07-26 NOTE — H&P ADULT - NSHPLABSRESULTS_GEN_ALL_CORE
EKG: Sinus Bradycardia @ 50bpm w/ PVCs, QTC 456ms  COVID: neg  GI PCR on : neg  CT Abd+Pelvis w/con: No no acute findings to explain patient's symptoms. Bilateral nonobstructing renal calculi.  CXR: clear lungs  GI PCR on 21: neg                        14.0   8.30  )-----------( 239      ( 2021 07:46 )             42.5         141  |  96<L>  |  14  ----------------------------<  111<H>  2.9<LL>   |  25  |  1.08    Ca    7.9<L>      2021 01:09  Phos  4.1       Mg     <0.50         TPro  7.8  /  Alb  4.3  /  TBili  1.6<H>  /  DBili  x   /  AST  15  /  ALT  18  /  AlkPhos  107        Urinalysis Basic - ( 2021 06:41 )    Color: Yellow / Appearance: Clear / S.024 / pH: x  Gluc: x / Ketone: Small  / Bili: Negative / Urobili: <2 mg/dL   Blood: x / Protein: 30 mg/dL / Nitrite: Negative   Leuk Esterase: Negative / RBC: <5 /HPF / WBC <5 /HPF   Sq Epi: x / Non Sq Epi: 0-5 /HPF / Bacteria: Negative        LIVER FUNCTIONS - ( 2021 01:09 )  Alb: 4.3 g/dL / Pro: 7.8 g/dL / ALK PHOS: 107 U/L / ALT: 18 U/L / AST: 15 U/L / GGT: x

## 2021-07-26 NOTE — ED PROVIDER NOTE - PHYSICAL EXAMINATION
Gen: Well appearing in NAD  Head: NC/AT  Neck: trachea midline  Resp:  No distress CTAB  CV: RRR  Abd: Soft NT ND  Ext: no deformities  Neuro:  A&O appears non focal  Skin:  Warm and dry as visualized  Psych:  Normal affect and mood

## 2021-07-27 DIAGNOSIS — R19.7 DIARRHEA, UNSPECIFIED: ICD-10-CM

## 2021-07-27 LAB
ALBUMIN SERPL ELPH-MCNC: 3.3 G/DL — SIGNIFICANT CHANGE UP (ref 3.3–5)
ANION GAP SERPL CALC-SCNC: 10 MMOL/L — SIGNIFICANT CHANGE UP (ref 7–14)
ANION GAP SERPL CALC-SCNC: 10 MMOL/L — SIGNIFICANT CHANGE UP (ref 7–14)
ANION GAP SERPL CALC-SCNC: 15 MMOL/L — HIGH (ref 7–14)
BUN SERPL-MCNC: 19 MG/DL — SIGNIFICANT CHANGE UP (ref 7–23)
BUN SERPL-MCNC: 21 MG/DL — SIGNIFICANT CHANGE UP (ref 7–23)
BUN SERPL-MCNC: 24 MG/DL — HIGH (ref 7–23)
C DIFF BY PCR RESULT: SIGNIFICANT CHANGE UP
C DIFF TOX GENS STL QL NAA+PROBE: SIGNIFICANT CHANGE UP
CA-I BLD-SCNC: 1.07 MMOL/L — LOW (ref 1.15–1.29)
CALCIUM SERPL-MCNC: 7.3 MG/DL — LOW (ref 8.4–10.5)
CALCIUM SERPL-MCNC: 7.6 MG/DL — LOW (ref 8.4–10.5)
CALCIUM SERPL-MCNC: 7.8 MG/DL — LOW (ref 8.4–10.5)
CHLORIDE SERPL-SCNC: 105 MMOL/L — SIGNIFICANT CHANGE UP (ref 98–107)
CHLORIDE SERPL-SCNC: 108 MMOL/L — HIGH (ref 98–107)
CHLORIDE SERPL-SCNC: 109 MMOL/L — HIGH (ref 98–107)
CO2 SERPL-SCNC: 24 MMOL/L — SIGNIFICANT CHANGE UP (ref 22–31)
CO2 SERPL-SCNC: 25 MMOL/L — SIGNIFICANT CHANGE UP (ref 22–31)
CO2 SERPL-SCNC: 28 MMOL/L — SIGNIFICANT CHANGE UP (ref 22–31)
COVID-19 SPIKE DOMAIN AB INTERP: POSITIVE
COVID-19 SPIKE DOMAIN ANTIBODY RESULT: >250 U/ML — HIGH
CREAT ?TM UR-MCNC: 128 MG/DL — SIGNIFICANT CHANGE UP
CREAT SERPL-MCNC: 0.91 MG/DL — SIGNIFICANT CHANGE UP (ref 0.5–1.3)
CREAT SERPL-MCNC: 0.96 MG/DL — SIGNIFICANT CHANGE UP (ref 0.5–1.3)
CREAT SERPL-MCNC: 1.01 MG/DL — SIGNIFICANT CHANGE UP (ref 0.5–1.3)
CULTURE RESULTS: SIGNIFICANT CHANGE UP
GLUCOSE SERPL-MCNC: 103 MG/DL — HIGH (ref 70–99)
GLUCOSE SERPL-MCNC: 85 MG/DL — SIGNIFICANT CHANGE UP (ref 70–99)
GLUCOSE SERPL-MCNC: 88 MG/DL — SIGNIFICANT CHANGE UP (ref 70–99)
HCT VFR BLD CALC: 39.7 % — SIGNIFICANT CHANGE UP (ref 39–50)
HGB BLD-MCNC: 12.6 G/DL — LOW (ref 13–17)
MAGNESIUM SERPL-MCNC: 1.7 MG/DL — SIGNIFICANT CHANGE UP (ref 1.6–2.6)
MAGNESIUM SERPL-MCNC: 1.7 MG/DL — SIGNIFICANT CHANGE UP (ref 1.6–2.6)
MAGNESIUM SERPL-MCNC: 1.8 MG/DL — SIGNIFICANT CHANGE UP (ref 1.6–2.6)
MCHC RBC-ENTMCNC: 28.6 PG — SIGNIFICANT CHANGE UP (ref 27–34)
MCHC RBC-ENTMCNC: 31.7 GM/DL — LOW (ref 32–36)
MCV RBC AUTO: 90 FL — SIGNIFICANT CHANGE UP (ref 80–100)
NRBC # BLD: 0 /100 WBCS — SIGNIFICANT CHANGE UP
NRBC # FLD: 0 K/UL — SIGNIFICANT CHANGE UP
PHOSPHATE SERPL-MCNC: 2.5 MG/DL — SIGNIFICANT CHANGE UP (ref 2.5–4.5)
PHOSPHATE SERPL-MCNC: 3.3 MG/DL — SIGNIFICANT CHANGE UP (ref 2.5–4.5)
PHOSPHATE SERPL-MCNC: 4.1 MG/DL — SIGNIFICANT CHANGE UP (ref 2.5–4.5)
PLATELET # BLD AUTO: 208 K/UL — SIGNIFICANT CHANGE UP (ref 150–400)
POTASSIUM SERPL-MCNC: 3.1 MMOL/L — LOW (ref 3.5–5.3)
POTASSIUM SERPL-MCNC: 3.6 MMOL/L — SIGNIFICANT CHANGE UP (ref 3.5–5.3)
POTASSIUM SERPL-MCNC: 4 MMOL/L — SIGNIFICANT CHANGE UP (ref 3.5–5.3)
POTASSIUM SERPL-SCNC: 3.1 MMOL/L — LOW (ref 3.5–5.3)
POTASSIUM SERPL-SCNC: 3.6 MMOL/L — SIGNIFICANT CHANGE UP (ref 3.5–5.3)
POTASSIUM SERPL-SCNC: 4 MMOL/L — SIGNIFICANT CHANGE UP (ref 3.5–5.3)
RBC # BLD: 4.41 M/UL — SIGNIFICANT CHANGE UP (ref 4.2–5.8)
RBC # FLD: 13.2 % — SIGNIFICANT CHANGE UP (ref 10.3–14.5)
SARS-COV-2 IGG+IGM SERPL QL IA: >250 U/ML — HIGH
SARS-COV-2 IGG+IGM SERPL QL IA: POSITIVE
SODIUM SERPL-SCNC: 144 MMOL/L — SIGNIFICANT CHANGE UP (ref 135–145)
SODIUM SERPL-SCNC: 144 MMOL/L — SIGNIFICANT CHANGE UP (ref 135–145)
SODIUM SERPL-SCNC: 146 MMOL/L — HIGH (ref 135–145)
SPECIMEN SOURCE: SIGNIFICANT CHANGE UP
WBC # BLD: 7.1 K/UL — SIGNIFICANT CHANGE UP (ref 3.8–10.5)
WBC # FLD AUTO: 7.1 K/UL — SIGNIFICANT CHANGE UP (ref 3.8–10.5)

## 2021-07-27 PROCEDURE — 99233 SBSQ HOSP IP/OBS HIGH 50: CPT

## 2021-07-27 PROCEDURE — 99222 1ST HOSP IP/OBS MODERATE 55: CPT | Mod: GC

## 2021-07-27 RX ORDER — CALCIUM CARBONATE 500(1250)
2 TABLET ORAL
Refills: 0 | Status: DISCONTINUED | OUTPATIENT
Start: 2021-07-27 | End: 2021-07-29

## 2021-07-27 RX ORDER — POTASSIUM CHLORIDE 20 MEQ
20 PACKET (EA) ORAL ONCE
Refills: 0 | Status: COMPLETED | OUTPATIENT
Start: 2021-07-27 | End: 2021-07-27

## 2021-07-27 RX ORDER — MAGNESIUM SULFATE 500 MG/ML
2 VIAL (ML) INJECTION ONCE
Refills: 0 | Status: COMPLETED | OUTPATIENT
Start: 2021-07-27 | End: 2021-07-27

## 2021-07-27 RX ORDER — MIRABEGRON 50 MG/1
25 TABLET, EXTENDED RELEASE ORAL DAILY
Refills: 0 | Status: DISCONTINUED | OUTPATIENT
Start: 2021-07-27 | End: 2021-07-29

## 2021-07-27 RX ORDER — POTASSIUM CHLORIDE 20 MEQ
40 PACKET (EA) ORAL ONCE
Refills: 0 | Status: COMPLETED | OUTPATIENT
Start: 2021-07-27 | End: 2021-07-27

## 2021-07-27 RX ADMIN — Medication 1000 UNIT(S): at 11:21

## 2021-07-27 RX ADMIN — Medication 81 MILLIGRAM(S): at 11:20

## 2021-07-27 RX ADMIN — Medication 50 GRAM(S): at 11:20

## 2021-07-27 RX ADMIN — LISINOPRIL 20 MILLIGRAM(S): 2.5 TABLET ORAL at 04:54

## 2021-07-27 RX ADMIN — Medication 2 TABLET(S): at 21:31

## 2021-07-27 RX ADMIN — ATORVASTATIN CALCIUM 40 MILLIGRAM(S): 80 TABLET, FILM COATED ORAL at 21:27

## 2021-07-27 RX ADMIN — Medication 20 MILLIEQUIVALENT(S): at 11:20

## 2021-07-27 RX ADMIN — ENOXAPARIN SODIUM 40 MILLIGRAM(S): 100 INJECTION SUBCUTANEOUS at 12:33

## 2021-07-27 RX ADMIN — Medication 90 MILLIGRAM(S): at 04:54

## 2021-07-27 RX ADMIN — Medication 50 GRAM(S): at 18:50

## 2021-07-27 RX ADMIN — Medication 1 TABLET(S): at 04:56

## 2021-07-27 RX ADMIN — Medication 40 MILLIEQUIVALENT(S): at 05:40

## 2021-07-27 RX ADMIN — FINASTERIDE 5 MILLIGRAM(S): 5 TABLET, FILM COATED ORAL at 11:20

## 2021-07-27 RX ADMIN — MAGNESIUM OXIDE 400 MG ORAL TABLET 400 MILLIGRAM(S): 241.3 TABLET ORAL at 08:36

## 2021-07-27 RX ADMIN — TAMSULOSIN HYDROCHLORIDE 0.4 MILLIGRAM(S): 0.4 CAPSULE ORAL at 21:28

## 2021-07-27 RX ADMIN — Medication 1 TABLET(S): at 17:17

## 2021-07-27 NOTE — CONSULT NOTE ADULT - SUBJECTIVE AND OBJECTIVE BOX
Chief Complaint:  Patient is a 80y old  Male who presents with a chief complaint of electrolytes disturbance, generalized weakness (2021 07:35)      HPI: 80yr old M with PMHx of HTN, HLD, PUD, CVA, and vertigo presenting to the hospital with a complaint of recurrent episodes of n/v/d. Patient states that the most recent episode started 5 days ago (friday night). Patient notes having some soup in the evening and then became nauseous around 11pm. He had a couple of episodes of non-bloody, non-bilious vomiting, as well as, an watery diarrhea. Over the weekend, he started having "yellow" vomit 2-3 times a day and 3-4 times of daily diarrhea. Patient experienced decreased in appetite, generalized weakness, dizziness, and lightheadedness, prompting the wife to bring him to ED for evaluation. Wife reports, he has also been losing some weight; unable to tell me how many pounds. Denies any fevers, chills, abdominal pain, joint pain, or any other symptoms.    Of note, he was admitted to [facility] for similar issue on . At that time, he was presumed to have had viral gastroenteritis - his stool studied were negative. Patient follows with Dr. Federico Longoria, most recently seen 2021. He also had a episode of self-limited gastroenteritis back then, but symptoms had resolved by the time of his 2021 visit. He has chronic GERD for which he takes daily Pantoprazole 40mg.     In the ED, patient was found to be hypocalcemic and hypomagnesemic upon arrival. He was given IV LR x1L, Mg Sulfate IV 2g and IV KCl with a resolution of his electrolyte abnormalities. At the time of my encounter, patient reports feeling better. He denies any n/v, abdominal pain, and tolerating PO intake. He still endorses 1-2 episodes of watery diarrhea.     Allergies:  No Known Allergies      Home Medications:    Hospital Medications:  aspirin  chewable 81 milliGRAM(s) Oral daily  atorvastatin 40 milliGRAM(s) Oral at bedtime  calcium carbonate   1250 mG (OsCal) 1 Tablet(s) Oral two times a day  cholecalciferol 1000 Unit(s) Oral daily  enoxaparin Injectable 40 milliGRAM(s) SubCutaneous every 24 hours  finasteride 5 milliGRAM(s) Oral daily  lactated ringers. 1000 milliLiter(s) IV Continuous <Continuous>  lisinopril 20 milliGRAM(s) Oral daily  meclizine 25 milliGRAM(s) Oral every 8 hours PRN  NIFEdipine XL 90 milliGRAM(s) Oral daily  tamsulosin 0.4 milliGRAM(s) Oral at bedtime      PMHX/PSHX:  CVA (Cerebral Infarction)    Secondary Seizure Disorder    PU (Peptic Ulcer)    Essential Hypertension    Hyperlipidemia    Vertigo    Status Post Inguinal Hernia Repair    Nephrolithiasis        Family history:  Acute myocardial infarction      Social History: no smoking    ROS:     General:  No wt loss, fevers, chills, night sweats, fatigue,   Eyes:  Good vision, no reported pain  ENT:  No sore throat, pain, runny nose, dysphagia  CV:  No pain, palpitations, hypo/hypertension  Resp:  No dyspnea, cough, tachypnea, wheezing  GI:  No pain, No nausea, No vomiting, No diarrhea, No constipation, No weight loss, No fever, No pruritis, No rectal bleeding, No tarry stools, No dysphagia,  :  No pain, bleeding, incontinence, nocturia  Muscle:  No pain, weakness  Neuro:  No weakness, tingling, memory problems  Psych:  No fatigue, insomnia, mood problems, depression  Endocrine:  No polyuria, polydipsia, cold/heat intolerance  Heme:  No petechiae, ecchymosis, easy bruisability  Skin:  No rash, tattoos, scars, edema      PHYSICAL EXAM:     GENERAL:  Appears stated age, well-groomed, well-nourished, no distress  HEENT:  NC/AT,  conjunctivae clear and pink, no thyromegaly, nodules, adenopathy, no JVD, sclera -anicteric  CHEST:  Full & symmetric excursion, no increased effort, breath sounds clear  HEART:  Regular rhythm, S1, S2, no murmur/rub/S3/S4, no abdominal bruit, no edema  ABDOMEN:  Soft, non-tender, non-distended, normoactive bowel sounds,  no masses ,no hepato-splenomegaly, no signs of chronic liver disease  EXTEREMITIES:  no cyanosis,clubbing or edema  SKIN:  No rash/erythema/ecchymoses/petechiae/wounds/abscess/warm/dry  NEURO:  Alert, oriented, no asterixis, no tremor, no encephalopathy    Vital Signs:  Vital Signs Last 24 Hrs  T(C): 36.8 (2021 12:30), Max: 36.8 (2021 12:30)  T(F): 98.3 (2021 12:30), Max: 98.3 (2021 12:30)  HR: 68 (2021 12:30) (40 - 71)  BP: 103/68 (2021 12:30) (103/68 - 146/87)  BP(mean): --  RR: 18 (2021 12:30) (18 - 23)  SpO2: 95% (2021 12:30) (95% - 98%)  Daily     Daily     LABS:                        12.6   7.10  )-----------( 208      ( 2021 08:44 )             39.7     Mean Cell Volume: 90.0 fL (- @ 08:44)        146<H>  |  108<H>  |  19  ----------------------------<  85  3.6   |  28  |  0.96    Ca    7.6<L>      2021 08:44  Phos  3.3       Mg     1.70         TPro  6.6  /  Alb  3.7  /  TBili  1.2  /  DBili  x   /  AST  16  /  ALT  15  /  AlkPhos  91      LIVER FUNCTIONS - ( 2021 07:46 )  Alb: 3.7 g/dL / Pro: 6.6 g/dL / ALK PHOS: 91 U/L / ALT: 15 U/L / AST: 16 U/L / GGT: x           C Diff by PCR Result: NotDetec (21 @ 15:59)      Urinalysis Basic - ( 2021 06:41 )    Color: Yellow / Appearance: Clear / S.024 / pH: x  Gluc: x / Ketone: Small  / Bili: Negative / Urobili: <2 mg/dL   Blood: x / Protein: 30 mg/dL / Nitrite: Negative   Leuk Esterase: Negative / RBC: <5 /HPF / WBC <5 /HPF   Sq Epi: x / Non Sq Epi: 0-5 /HPF / Bacteria: Negative                              12.6   7.10  )-----------( 208      ( 2021 08:44 )             39.7                         14.0   8.30  )-----------( 239      ( 2021 07:46 )             42.5                         15.5   8.71  )-----------( 265      ( 2021 01:09 )             46.4     Imaging:    < from: CT Abdomen and Pelvis w/ IV Cont (21 @ 04:26) >  IMPRESSION:  No no acute findings to explain patient's symptoms.    Again seen are bilateral nonobstructing renal calculi.    < end of copied text >           Chief Complaint:  Patient is a 80y old  Male who presents with a chief complaint of electrolytes disturbance, generalized weakness (2021 07:35)      HPI: 80yr old M with PMHx of HTN, HLD, PUD, CVA, and vertigo presenting to the hospital with a complaint of recurrent episodes of n/v/d. Patient states that the most recent episode started 5 days ago (friday night). Patient notes having some soup in the evening and then became nauseous around 11pm. He had a couple of episodes of non-bloody, non-bilious vomiting, as well as, an watery diarrhea. Over the weekend, he started having "yellow" vomit 2-3 times a day and 3-4 times of daily diarrhea. Patient experienced decreased in appetite, generalized weakness, dizziness, and lightheadedness, prompting the wife to bring him to ED for evaluation. Wife reports, he has also been losing some weight; unable to tell me how many pounds. Denies any fevers, chills, abdominal pain, joint pain, or any other symptoms.    Of note, he was admitted to [facility] for similar issue on . At that time, he was presumed to have had viral gastroenteritis - his stool studied were negative. Patient follows with Dr. Federico Longoria, most recently seen 2021. He also had a episode of self-limited gastroenteritis back then, but symptoms had resolved by the time of his 2021 visit. He has chronic GERD for which he takes daily Pantoprazole 40mg.     In the ED, patient was found to be hypocalcemic and hypomagnesemic upon arrival. He was given IV LR x1L, Mg Sulfate IV 2g and IV KCl with a resolution of his electrolyte abnormalities. At the time of my encounter, patient reports feeling better. He denies any n/v, abdominal pain, and tolerating PO intake. He still endorses 1-2 episodes of watery diarrhea.     Allergies:  No Known Allergies      Home Medications:    Hospital Medications:  aspirin  chewable 81 milliGRAM(s) Oral daily  atorvastatin 40 milliGRAM(s) Oral at bedtime  calcium carbonate   1250 mG (OsCal) 1 Tablet(s) Oral two times a day  cholecalciferol 1000 Unit(s) Oral daily  enoxaparin Injectable 40 milliGRAM(s) SubCutaneous every 24 hours  finasteride 5 milliGRAM(s) Oral daily  lactated ringers. 1000 milliLiter(s) IV Continuous <Continuous>  lisinopril 20 milliGRAM(s) Oral daily  meclizine 25 milliGRAM(s) Oral every 8 hours PRN  NIFEdipine XL 90 milliGRAM(s) Oral daily  tamsulosin 0.4 milliGRAM(s) Oral at bedtime      PMHX/PSHX:  CVA (Cerebral Infarction)    Secondary Seizure Disorder    PU (Peptic Ulcer)    Essential Hypertension    Hyperlipidemia    Vertigo    Status Post Inguinal Hernia Repair    Nephrolithiasis        Family history:  Acute myocardial infarction      Social History: no smoking    ROS:     General:  No wt loss, fevers, chills, night sweats, fatigue,   Eyes:  Good vision, no reported pain  ENT:  No sore throat, pain, runny nose, dysphagia  CV:  No pain, palpitations, hypo/hypertension  Resp:  No dyspnea, cough, tachypnea, wheezing  GI:  No pain, No nausea, No vomiting, No diarrhea, No constipation, No weight loss, No fever, No pruritis, No rectal bleeding, No tarry stools, No dysphagia,  :  No pain, bleeding, incontinence, nocturia  Muscle:  No pain, weakness  Neuro:  No weakness, tingling, memory problems  Psych:  No fatigue, insomnia, mood problems, depression  Endocrine:  No polyuria, polydipsia, cold/heat intolerance  Heme:  No petechiae, ecchymosis, easy bruisability  Skin:  No rash, tattoos, scars, edema      PHYSICAL EXAM:     GENERAL:  Appears stated age, well-groomed, well-nourished, no distress  HEENT:  NC/AT,  conjunctivae clear and pink, no thyromegaly, nodules, adenopathy, no JVD, sclera -anicteric  CHEST:  Full & symmetric excursion, no increased effort, breath sounds clear  HEART:  Regular rhythm, S1, S2, no murmur/rub/S3/S4, no abdominal bruit, no edema  ABDOMEN:  Soft, non-tender, non-distended, normoactive bowel sounds,  no masses ,no hepato-splenomegaly, no signs of chronic liver disease  EXTREMITIES:  no cyanosis, clubbing or edema  SKIN:  No rash  NEURO:  Alert, oriented, no asterixis, no tremor, no encephalopathy    Vital Signs:  Vital Signs Last 24 Hrs  T(C): 36.8 (2021 12:30), Max: 36.8 (2021 12:30)  T(F): 98.3 (2021 12:30), Max: 98.3 (2021 12:30)  HR: 68 (2021 12:30) (40 - 71)  BP: 103/68 (2021 12:30) (103/68 - 146/87)  RR: 18 (2021 12:30) (18 - 23)  SpO2: 95% (2021 12:30) (95% - 98%)  Daily     Daily     LABS:                        12.6   7.10  )-----------( 208      ( 2021 08:44 )             39.7     Mean Cell Volume: 90.0 fL (21 @ 08:44)        146<H>  |  108<H>  |  19  ----------------------------<  85  3.6   |  28  |  0.96    Ca    7.6<L>      2021 08:44  Phos  3.3       Mg     1.70         TPro  6.6  /  Alb  3.7  /  TBili  1.2  /  DBili  x   /  AST  16  /  ALT  15  /  AlkPhos  91      LIVER FUNCTIONS - ( 2021 07:46 )  Alb: 3.7 g/dL / Pro: 6.6 g/dL / ALK PHOS: 91 U/L / ALT: 15 U/L / AST: 16 U/L / GGT: x           C Diff by PCR Result: Perez (21 @ 15:59)      Urinalysis Basic - ( 2021 06:41 )    Color: Yellow / Appearance: Clear / S.024 / pH: x  Gluc: x / Ketone: Small  / Bili: Negative / Urobili: <2 mg/dL   Blood: x / Protein: 30 mg/dL / Nitrite: Negative   Leuk Esterase: Negative / RBC: <5 /HPF / WBC <5 /HPF   Sq Epi: x / Non Sq Epi: 0-5 /HPF / Bacteria: Negative                              12.6   7.10  )-----------( 208      ( 2021 08:44 )             39.7                         14.0   8.30  )-----------( 239      ( 2021 07:46 )             42.5                         15.5   8.71  )-----------( 265      ( 2021 01:09 )             46.4     Imaging:    < from: CT Abdomen and Pelvis w/ IV Cont (21 @ 04:26) >  IMPRESSION:  No no acute findings to explain patient's symptoms.    Again seen are bilateral nonobstructing renal calculi.    < end of copied text >

## 2021-07-27 NOTE — CHART NOTE - NSCHARTNOTEFT_GEN_A_CORE
81 y/o F with electrolyte disturbances possibly from diarrhea. Called for hypocalcemia.   Corrected Ca is 8.4.     Can increase calcium carbonate 1250 mg (500 mg elemental Calcium) to 2 tabs BID  Can also start Calcitriol 0.25 mg BID     Please reconsult if no improvement or with persistent corrected hypocalcemia    Discussed with primary team     Sonia Arias MD  Endocrine Fellow  Pager: [phone]  Consults 9am-5pm: [phone]  After 5pm and weekends: [phone] 79 y/o F with electrolyte disturbances possibly from diarrhea. Called for hypocalcemia.   Corrected Ca is 8.4.     Can increase calcium carbonate 1250 mg (500 mg elemental Calcium) to 2 tabs BID  Can also start Vitamin D 2000 units daily    Please reconsult if no improvement or with persistent corrected hypocalcemia    Discussed with primary team     Sonia Arias MD  Endocrine Fellow  Pager: [phone]  Consults 9am-5pm: [phone]  After 5pm and weekends: [phone]

## 2021-07-27 NOTE — PROVIDER CONTACT NOTE (OTHER) - ASSESSMENT
Patient A&Ox3, VS as documented, NSR on tele. Patient denies chest pain and SOB, and asymptomatic. Patient now back to 68 on cardiac monitor.

## 2021-07-27 NOTE — PROGRESS NOTE ADULT - ASSESSMENT
80M HTN, HLD, CVA (ataxia as residual,) and vertigo p/w generalized weakness, nausea, vomiting and diarrhea x4 days, found to be hypocalcemic, hypokalemic and hypomagnesemic, admitted to tele for electrolyte disturbance with nausea, vomiting and diarrhea.

## 2021-07-27 NOTE — CONSULT NOTE ADULT - ASSESSMENT
80yr old M with PMHx of HTN, HLD, PUD, CVA, and vertigo presenting to the hospital with a complaint of recurrent episodes of self-resolving n/v/d. CT abdomen negative for acute findings. C.Diff negative    #nausea, vomiting, diarrhea: Given the self-resolving nature of his symptoms, the most likely cause of his n/v/d is a gastroenteritis. C.diff was negative and stool PCR on 7/25 was also negative. Given the lack of leukocytosis, fever, and bloody diarrhea, bacterial gastroenteritis is less likely vs a viral gastroenteritis. Other differential diagnosis to consider: pancreatitis vs celiacs vs carcinoid vs medication-induced. Less likely to be pancreatitis as lipase WNL and CT not indicative of chronic pancreatitis. Carcinoid syndrome can cause recurrent n/v/d, however, patient denies any cough, wheezing, dyspnea, flushing or any other typical causes of carcinoid. Patient  80yr old M with PMHx of HTN, HLD, PUD, CVA, and vertigo presenting to the hospital with a complaint of recurrent episodes of self-resolving n/v/d. CT abdomen negative for acute findings. C.Diff negative    #nausea, vomiting, diarrhea: Given the self-resolving nature of his symptoms, diagnosis to consider is gastroenteritis. C.diff was negative and stool PCR on 7/25 was also negative. However, due to the cyclical nature of his GI symptoms, and the lack of leukocytosis, fever, and bloody stools makes infectious gastroenteritis lower on the differential. Other diagnoses to consider: pancreatitis vs celiacs vs carcinoid vs medication-induced. Less likely to be pancreatitis as lipase WNL and CT not indicative of chronic pancreatitis. Malabsorption is also another etiology to consider, however, a self-resolving cyclical presentation is atypical of celiacs, making it lower on the diff. Carcinoid syndrome can cause recurrent n/v/d, however, patient denies any cough, wheezing, dyspnea, flushing or any other typical causes of carcinoid. CT was also not suggestive of carcinoid tumors. Patient came in with electrolyte abnormalities likely 2/2 pantoprazole use. Pantoprazole can cause n/v/d. However, it is unclear if pantoprazole use is causing severe n/v/d leading to electrolyte abnormality or pantoprazole use is causing electrolyte abnormalities, which is leading to his n/v/d.     Recommendation:   - At this time, patient is feeling better with correction of his electrolyte abnormalities and cessation of pantoprazole. so no further inpatient work-up is needed from GI standpoint  - would recommend holding off pantoprazole at this time. Patient should not be discharged on pantoprazole. Will need to be monitored outpatient  - Patient should follow-up with his gastroenterologist, Dr. Ornelas outpatient to f/u on his electrolyte abnormality and the need for PPI 80yr old M with PMHx of HTN, HLD, PUD, CVA, and vertigo presenting to the hospital with a complaint of recurrent episodes of self-resolving n/v/d. CT abdomen negative for acute findings. C.Diff negative. GI consulted for recurrent episodes of n/v/d    #nausea, vomiting, diarrhea: Given the self-resolving nature of his symptoms, diagnosis to consider is gastroenteritis. C.diff was negative and stool PCR on 7/25 was also negative. Furthermore, due to the cyclical nature of his GI symptoms, and the lack of leukocytosis, fever, and bloody stools makes infectious gastroenteritis lower on the differential. Other diagnoses to consider: pancreatitis vs celiacs vs carcinoid vs medication-induced. Less likely to be pancreatitis as lipase WNL and CT not indicative of chronic pancreatitis. Malabsorption is also another etiology to consider, however, a self-resolving cyclical presentation is atypical of celiacs, making it lower on the diff. Carcinoid syndrome can cause recurrent n/v/d, however, patient denies any cough, wheezing, dyspnea, flushing or any other typical causes of carcinoid. CT was also not suggestive of carcinoid tumors. Patient came in with electrolyte abnormalities likely 2/2 pantoprazole use. Pantoprazole can cause n/v/d. However, it is unclear if pantoprazole use is causing severe n/v/d leading to electrolyte abnormality or pantoprazole use is causing electrolyte abnormalities, which is leading to his n/v/d.     Recommendation:   - At this time, patient is feeling better with correction of his electrolyte abnormalities and cessation of pantoprazole. so no further inpatient work-up is needed from GI standpoint  - would recommend holding off pantoprazole at this time. Patient should not be discharged on pantoprazole. Will need to be monitored outpatient  - Patient should follow-up with his gastroenterologist, Dr. Ornelas outpatient to f/u on his electrolyte abnormality and the need for PPI 80yr old M with PMHx of HTN, HLD, PUD, CVA, and vertigo presenting to the hospital with a complaint of recurrent episodes of self-resolving n/v/d. CT abdomen negative for acute findings. C.Diff negative. GI consulted for recurrent episodes of n/v/d    #nausea, vomiting, diarrhea: Given the self-resolving nature of his symptoms, diagnosis to consider is gastroenteritis. C.diff was negative and stool PCR on 7/25 was also negative. Furthermore, due to the cyclical nature of his GI symptoms, and the lack of leukocytosis, fever, and bloody stools makes infectious gastroenteritis lower on the differential. Other diagnoses to consider: pancreatitis vs celiacs vs carcinoid vs medication-induced. Less likely to be pancreatitis as lipase WNL and CT not indicative of chronic pancreatitis. Malabsorption is also another etiology to consider, however, a self-resolving cyclical presentation is atypical of celiacs, making it lower on the diff. Carcinoid syndrome can cause recurrent n/v/d, however, patient denies any cough, wheezing, dyspnea, flushing or any other typical causes of carcinoid. CT was also not suggestive of carcinoid tumors. Patient came in with electrolyte abnormalities likely 2/2 pantoprazole use. Pantoprazole can cause n/v/d. However, it is unclear if pantoprazole use is causing severe n/v/d leading to electrolyte abnormality or pantoprazole use is causing electrolyte abnormalities, which is leading to his n/v/d    Recommendation:   - At this time, patient is feeling better. No further inpatient work-up is needed from GI standpoint  - would recommend holding off pantoprazole at this time  - Patient should follow-up with his gastroenterologist, Dr. Ornelas, outpatient to f/u on his electrolytes and the need for PPI 80yr old M with PMHx of HTN, HLD, PUD, CVA, and vertigo presenting to the hospital with a complaint of recurrent episodes of self-resolving n/v/d. CT abdomen negative for acute findings. C.Diff negative. GI consulted for recurrent episodes of n/v/d    #nausea, vomiting, diarrhea: Given the self-resolving nature of his symptoms, diagnosis to consider is gastroenteritis. C.diff was negative and stool PCR on 7/25 was also negative. Furthermore, due to the cyclical nature of his GI symptoms, and the lack of leukocytosis, fever, and bloody stools makes infectious gastroenteritis lower on the differential. Other diagnoses to consider: pancreatitis vs celiacs vs carcinoid vs medication-induced. Less likely to be pancreatitis as lipase WNL and CT not indicative of chronic pancreatitis. Malabsorption is also another etiology to consider, however, a self-resolving cyclical presentation is atypical of celiacs, making it lower on the diff. Carcinoid syndrome can cause recurrent n/v/d, however, patient denies any cough, wheezing, dyspnea, flushing or any other typical causes of carcinoid. CT was also not suggestive of carcinoid tumors. Patient came in with electrolyte abnormalities likely 2/2 pantoprazole use. Pantoprazole can cause n/v/d. However, it is unclear if pantoprazole use is causing severe n/v/d leading to electrolyte abnormality or pantoprazole use is causing electrolyte abnormalities, which is leading to his n/v/d    Recommendation:   - At this time, patient is feeling better. No further inpatient work-up is needed from GI standpoint  - would recommend holding off pantoprazole at this time  - Patient should follow-up with his gastroenterologist, Dr. Longoria, outpatient to f/u on his electrolytes and the need for PPI

## 2021-07-27 NOTE — CONSULT NOTE ADULT - ATTENDING COMMENTS
Patient with at least 3 episodes of nausea/vomiting/diarrhea associated with electrolytes disturbances. Prior infectious workup and imaging unremarkable and symptoms again resolved with supportive care and correction of electrolytes. Though symptoms had initially been concerning for self-limited gastroenteritis-like presentation, it is unusual to have recurrent symptoms such as his. It is possible that his symptoms were secondary to his chronic PPI therapy. At this time, it is still unclear if his electrolyte disturbances led to his symptoms or were a result of his GI losses.   Discussed that patient should follow up closely with his GI, Dr. Longoria, to determine if further testing is needed. Would ensure close monitoring for recurrence of symptoms/electrolyte abnormalities as outpatient once PPI discontinued.   Dr. Longoria has been updated as well regarding patient's second presentation in recent weeks.

## 2021-07-28 ENCOUNTER — TRANSCRIPTION ENCOUNTER (OUTPATIENT)
Age: 81
End: 2021-07-28

## 2021-07-28 LAB
24R-OH-CALCIDIOL SERPL-MCNC: 37 NG/ML — SIGNIFICANT CHANGE UP (ref 30–80)
ALBUMIN SERPL ELPH-MCNC: 3.4 G/DL — SIGNIFICANT CHANGE UP (ref 3.3–5)
ANION GAP SERPL CALC-SCNC: 10 MMOL/L — SIGNIFICANT CHANGE UP (ref 7–14)
ANION GAP SERPL CALC-SCNC: 11 MMOL/L — SIGNIFICANT CHANGE UP (ref 7–14)
BUN SERPL-MCNC: 17 MG/DL — SIGNIFICANT CHANGE UP (ref 7–23)
BUN SERPL-MCNC: 18 MG/DL — SIGNIFICANT CHANGE UP (ref 7–23)
CALCIUM SERPL-MCNC: 8.2 MG/DL — LOW (ref 8.4–10.5)
CALCIUM SERPL-MCNC: 8.2 MG/DL — LOW (ref 8.4–10.5)
CHLORIDE SERPL-SCNC: 106 MMOL/L — SIGNIFICANT CHANGE UP (ref 98–107)
CHLORIDE SERPL-SCNC: 107 MMOL/L — SIGNIFICANT CHANGE UP (ref 98–107)
CO2 SERPL-SCNC: 24 MMOL/L — SIGNIFICANT CHANGE UP (ref 22–31)
CO2 SERPL-SCNC: 24 MMOL/L — SIGNIFICANT CHANGE UP (ref 22–31)
CORTIS AM PEAK SERPL-MCNC: 14.8 UG/DL — SIGNIFICANT CHANGE UP (ref 6–18.4)
CREAT SERPL-MCNC: 0.8 MG/DL — SIGNIFICANT CHANGE UP (ref 0.5–1.3)
CREAT SERPL-MCNC: 1.03 MG/DL — SIGNIFICANT CHANGE UP (ref 0.5–1.3)
CULTURE RESULTS: SIGNIFICANT CHANGE UP
GLUCOSE SERPL-MCNC: 100 MG/DL — HIGH (ref 70–99)
GLUCOSE SERPL-MCNC: 108 MG/DL — HIGH (ref 70–99)
HCT VFR BLD CALC: 37.6 % — LOW (ref 39–50)
HGB BLD-MCNC: 12.3 G/DL — LOW (ref 13–17)
MAGNESIUM SERPL-MCNC: 1.8 MG/DL — SIGNIFICANT CHANGE UP (ref 1.6–2.6)
MAGNESIUM SERPL-MCNC: 1.9 MG/DL — SIGNIFICANT CHANGE UP (ref 1.6–2.6)
MAGNESIUM UR-MCNC: 21.1 MG/DL — SIGNIFICANT CHANGE UP
MCHC RBC-ENTMCNC: 28.5 PG — SIGNIFICANT CHANGE UP (ref 27–34)
MCHC RBC-ENTMCNC: 32.7 GM/DL — SIGNIFICANT CHANGE UP (ref 32–36)
MCV RBC AUTO: 87.2 FL — SIGNIFICANT CHANGE UP (ref 80–100)
NRBC # BLD: 0 /100 WBCS — SIGNIFICANT CHANGE UP
NRBC # FLD: 0 K/UL — SIGNIFICANT CHANGE UP
PHOSPHATE SERPL-MCNC: 2.4 MG/DL — LOW (ref 2.5–4.5)
PHOSPHATE SERPL-MCNC: 2.5 MG/DL — SIGNIFICANT CHANGE UP (ref 2.5–4.5)
PLATELET # BLD AUTO: 203 K/UL — SIGNIFICANT CHANGE UP (ref 150–400)
POTASSIUM SERPL-MCNC: 3.4 MMOL/L — LOW (ref 3.5–5.3)
POTASSIUM SERPL-MCNC: 3.9 MMOL/L — SIGNIFICANT CHANGE UP (ref 3.5–5.3)
POTASSIUM SERPL-SCNC: 3.4 MMOL/L — LOW (ref 3.5–5.3)
POTASSIUM SERPL-SCNC: 3.9 MMOL/L — SIGNIFICANT CHANGE UP (ref 3.5–5.3)
RBC # BLD: 4.31 M/UL — SIGNIFICANT CHANGE UP (ref 4.2–5.8)
RBC # FLD: 13.2 % — SIGNIFICANT CHANGE UP (ref 10.3–14.5)
SODIUM SERPL-SCNC: 141 MMOL/L — SIGNIFICANT CHANGE UP (ref 135–145)
SODIUM SERPL-SCNC: 141 MMOL/L — SIGNIFICANT CHANGE UP (ref 135–145)
SPECIMEN SOURCE: SIGNIFICANT CHANGE UP
VIT D25+D1,25 OH+D1,25 PNL SERPL-MCNC: 57.8 PG/ML — SIGNIFICANT CHANGE UP (ref 19.9–79.3)
WBC # BLD: 7.03 K/UL — SIGNIFICANT CHANGE UP (ref 3.8–10.5)
WBC # FLD AUTO: 7.03 K/UL — SIGNIFICANT CHANGE UP (ref 3.8–10.5)

## 2021-07-28 PROCEDURE — 99231 SBSQ HOSP IP/OBS SF/LOW 25: CPT | Mod: GC

## 2021-07-28 PROCEDURE — 99232 SBSQ HOSP IP/OBS MODERATE 35: CPT

## 2021-07-28 RX ORDER — PANTOPRAZOLE SODIUM 20 MG/1
1 TABLET, DELAYED RELEASE ORAL
Qty: 0 | Refills: 0 | DISCHARGE

## 2021-07-28 RX ORDER — MAGNESIUM OXIDE 400 MG ORAL TABLET 241.3 MG
400 TABLET ORAL
Refills: 0 | Status: DISCONTINUED | OUTPATIENT
Start: 2021-07-28 | End: 2021-07-29

## 2021-07-28 RX ORDER — CALCITRIOL 0.5 UG/1
0.25 CAPSULE ORAL
Refills: 0 | Status: DISCONTINUED | OUTPATIENT
Start: 2021-07-28 | End: 2021-07-28

## 2021-07-28 RX ORDER — MAGNESIUM SULFATE 500 MG/ML
1 VIAL (ML) INJECTION ONCE
Refills: 0 | Status: COMPLETED | OUTPATIENT
Start: 2021-07-28 | End: 2021-07-28

## 2021-07-28 RX ORDER — POTASSIUM CHLORIDE 20 MEQ
20 PACKET (EA) ORAL
Refills: 0 | Status: COMPLETED | OUTPATIENT
Start: 2021-07-28 | End: 2021-07-28

## 2021-07-28 RX ORDER — POTASSIUM CHLORIDE 20 MEQ
40 PACKET (EA) ORAL
Refills: 0 | Status: DISCONTINUED | OUTPATIENT
Start: 2021-07-28 | End: 2021-07-28

## 2021-07-28 RX ORDER — SODIUM,POTASSIUM PHOSPHATES 278-250MG
1 POWDER IN PACKET (EA) ORAL
Refills: 0 | Status: DISCONTINUED | OUTPATIENT
Start: 2021-07-28 | End: 2021-07-29

## 2021-07-28 RX ORDER — MAGNESIUM OXIDE 400 MG ORAL TABLET 241.3 MG
400 TABLET ORAL
Refills: 0 | Status: DISCONTINUED | OUTPATIENT
Start: 2021-07-28 | End: 2021-07-28

## 2021-07-28 RX ADMIN — CALCITRIOL 0.25 MICROGRAM(S): 0.5 CAPSULE ORAL at 04:53

## 2021-07-28 RX ADMIN — Medication 2 TABLET(S): at 17:21

## 2021-07-28 RX ADMIN — ENOXAPARIN SODIUM 40 MILLIGRAM(S): 100 INJECTION SUBCUTANEOUS at 08:37

## 2021-07-28 RX ADMIN — Medication 2 TABLET(S): at 04:55

## 2021-07-28 RX ADMIN — FINASTERIDE 5 MILLIGRAM(S): 5 TABLET, FILM COATED ORAL at 08:37

## 2021-07-28 RX ADMIN — Medication 90 MILLIGRAM(S): at 04:53

## 2021-07-28 RX ADMIN — Medication 100 GRAM(S): at 08:32

## 2021-07-28 RX ADMIN — ATORVASTATIN CALCIUM 40 MILLIGRAM(S): 80 TABLET, FILM COATED ORAL at 21:05

## 2021-07-28 RX ADMIN — MAGNESIUM OXIDE 400 MG ORAL TABLET 400 MILLIGRAM(S): 241.3 TABLET ORAL at 17:21

## 2021-07-28 RX ADMIN — Medication 1000 UNIT(S): at 08:37

## 2021-07-28 RX ADMIN — Medication 20 MILLIEQUIVALENT(S): at 09:21

## 2021-07-28 RX ADMIN — TAMSULOSIN HYDROCHLORIDE 0.4 MILLIGRAM(S): 0.4 CAPSULE ORAL at 21:05

## 2021-07-28 RX ADMIN — Medication 81 MILLIGRAM(S): at 08:36

## 2021-07-28 RX ADMIN — LISINOPRIL 20 MILLIGRAM(S): 2.5 TABLET ORAL at 04:53

## 2021-07-28 RX ADMIN — Medication 20 MILLIEQUIVALENT(S): at 12:16

## 2021-07-28 RX ADMIN — MIRABEGRON 25 MILLIGRAM(S): 50 TABLET, EXTENDED RELEASE ORAL at 08:37

## 2021-07-28 NOTE — DISCHARGE NOTE PROVIDER - CARE PROVIDER_API CALL
Magalie Singh)  Internal Medicine  [address] [facility] [address] [facility], [address]  [place] [address]  Phone: [phone]  Fax: [phone]  Follow Up Time:     Federico Longoria)  Gastroenterology; Internal Medicine  [address] [place] [address] [address]  [place] [address]  Phone: [phone]  Fax: [phone]  Follow Up Time:

## 2021-07-28 NOTE — DISCHARGE NOTE PROVIDER - HOSPITAL COURSE
80M HTN, HLD, CVA (ataxia as residual,) and vertigo p/w generalized weakness, nausea, vomiting and diarrhea x4 days, found to be hypocalcemic, hypokalemic and hypomagnesemic, admitted to tele for electrolyte disturbance with nausea, vomiting and diarrhea.  Electrolyte issues presume most likely related to PPI.     # Electrolyte disturbance: Electrolyte disturbance in a setting of nausea, vomiting and diarrhea with possible medication side effect contributing - in the past was on PPI which caused hypomagnesemia and as a result hypocalcemia.   - d/c'd Protonix (pt denies any GI bleeding or GERD symptoms.) and may be causing hypomagnesemia which is then leading to low Ca and low K   - electrolyte disturbances resolved   - Ca better c/w 2 tabs BID, will not add calcitriol at this time as likely low Ca driven by low Mg  - can dc on mag ox 400 mg BID  - dc off PPI    # Nausea, vomiting and diarrhea: Nausea and vomiting subsided since pt in hospital. Now pt reports he is hungry, diarrhea improved reportedly at baseline always has soft stool.   - no further nausea or vomiting since ED   - tolerating PO  - diarrhea improving  - c diff neg this admission, last admit had neg GI PCR, stool culture neg   - GI eval appreciated, no further eval, OP GI f/u with Dr. Longoria.     # Generalized weakness: Weakness likely due to electrolyte disturbances  - improved   - PT eval, rec home PT.     # Vertigo  - c/w Meclizine PRN.     # Essential Hypertension  - c/w Lisinopril and Nifedipine.    # Hyperlipidemia   - c/w atorvastatin.      # BPH (benign prostatic hyperplasia).   - c/w Flomax and Finasteride.       Dispo: home  GI and PCP f/u 80M HTN, HLD, CVA (ataxia as residual,) and vertigo p/w generalized weakness, nausea, vomiting and diarrhea x4 days, found to be hypocalcemic, hypokalemic and hypomagnesemic, admitted to tele for electrolyte disturbance with nausea, vomiting and diarrhea.  Electrolyte issues presume most likely related to PPI.     # Electrolyte disturbance: Electrolyte disturbance in a setting of nausea, vomiting and diarrhea with possible medication side effect contributing - in the past was on PPI which caused hypomagnesemia and as a result hypocalcemia.   - d/c'd Protonix (pt denies any GI bleeding or GERD symptoms.) and may be causing hypomagnesemia which is then leading to low Ca and low K   - electrolyte disturbances resolved   - Ca better c/w 2 tabs BID, will not add calcitriol at this time as likely low Ca driven by low Mg  - can dc on mag ox 400 mg BID  - dc off PPI    # Nausea, vomiting and diarrhea: Nausea and vomiting subsided since pt in hospital. Now pt reports he is hungry, diarrhea improved reportedly at baseline always has soft stool.   - no further nausea or vomiting since ED   - tolerating PO  - diarrhea improving  - c diff neg this admission, last admit had neg GI PCR, stool culture neg   - GI eval appreciated, no further eval, OP GI f/u with Dr. Longoria.     # Generalized weakness: Weakness likely due to electrolyte disturbances  - improved   - PT eval, rec home PT.     # Vertigo  - c/w Meclizine PRN.     # Essential Hypertension  - c/w Lisinopril and Nifedipine.    # Hyperlipidemia   - c/w atorvastatin.      # BPH (benign prostatic hyperplasia).   - c/w Flomax and Finasteride.       Dispo: home  GI and PCP f/u     On 7/29/2021 this case was reviewed with Dr. Chu, who agrees that this patient is medically optimized for discharge home.

## 2021-07-28 NOTE — PROGRESS NOTE ADULT - ASSESSMENT
80yr old M with PMHx of HTN, HLD, PUD, CVA, and vertigo presenting to the hospital with a complaint of recurrent episodes of self-resolving n/v/d. CT abdomen negative for acute findings. C.Diff negative. GI consulted for recurrent episodes of n/v/d. Symptoms now improving    #nausea, vomiting, diarrhea: Patient has had at multiple episodes of n/v/d with electrolyte abnormalities that resolved with supportive care and correction of his electrolyte abnormalities. Infectious work-up has been negative and the cyclical presentation is atypical of infectious gastroenteritis. Less likely to be other causes as discussed initially. Patient symptoms likely 2/2 to pantoprazole use, as pantoprazole can cause n/v/d and electrolyte abnormalities. Unclear if electrolyte abnormalities are causing his symptoms or are result of severe n/v/d    Recommendation:   - At this time, patient is feeling better. No further inpatient work-up is needed from GI standpoint  - would recommend holding off pantoprazole at this time  - Patient should follow-up with his gastroenterologist, Dr. Longoria, outpatient to f/u on his electrolytes and determine if further testing is needed 80yr old M with PMHx of HTN, HLD, PUD, CVA, and vertigo presenting to the hospital with a complaint of recurrent episodes of self-resolving n/v/d. CT abdomen negative for acute findings. C.Diff negative. GI consulted for recurrent episodes of n/v/d. Symptoms now improving    #nausea, vomiting, diarrhea: Patient has had at multiple episodes of n/v/d with electrolyte abnormalities that resolved with supportive care and correction of his electrolyte abnormalities. Infectious work-up has been negative and the cyclical presentation is atypical of infectious gastroenteritis. Less likely to be other causes as discussed initially. Patient symptoms likely 2/2 to pantoprazole use, as pantoprazole can cause n/v/d and electrolyte abnormalities. Unclear if electrolyte abnormalities are causing his symptoms or are result of severe n/v/d    Recommendation:   - At this time, patient is feeling better. No further inpatient work-up is needed from GI standpoint.  - would recommend holding off pantoprazole at this time  - Patient should follow-up with his gastroenterologist, Dr. Longoria, outpatient to f/u on his electrolytes and determine if further testing is needed  - GI will sign off at this time

## 2021-07-28 NOTE — DISCHARGE NOTE PROVIDER - NSDCMRMEDTOKEN_GEN_ALL_CORE_FT
aspirin 81 mg oral tablet, chewable: 1 tab(s) orally once a day  atorvastatin 40 mg oral tablet: 1 tab(s) orally once a day  calcium carbonate 1250 mg (500 mg elemental calcium) oral tablet: 1 tab(s) orally 2 times a day  cholecalciferol oral tablet: 1000 unit(s) orally once a day  finasteride 5 mg oral tablet: 1 tab(s) orally once a day  lisinopril 20 mg oral tablet: 1 tab(s) orally once a day  meclizine 25 mg oral tablet: 1 tab(s) orally every 8 hours, As needed, Dizziness  MYRBETRIQ  25 MG TB24: 1 tab(s) orally once a day  NIFEdipine 90 mg oral tablet, extended release: 1 tab(s) orally once a day  tamsulosin 0.4 mg oral capsule: 1 cap(s) orally once a day (at bedtime)   aspirin 81 mg oral tablet, chewable: 1 tab(s) orally once a day  atorvastatin 40 mg oral tablet: 1 tab(s) orally once a day  calcium carbonate 1250 mg (500 mg elemental calcium) oral tablet: 2 tab(s) orally 2 times a day   cholecalciferol oral tablet: 1000 unit(s) orally once a day  finasteride 5 mg oral tablet: 1 tab(s) orally once a day  lisinopril 20 mg oral tablet: 1 tab(s) orally once a day  magnesium oxide 400 mg oral tablet: 1 tab(s) orally 3 times a day (with meals)  meclizine 25 mg oral tablet: 1 tab(s) orally every 8 hours, As needed, Dizziness  MYRBETRIQ  25 MG TB24: 1 tab(s) orally once a day  NIFEdipine 90 mg oral tablet, extended release: 1 tab(s) orally once a day  tamsulosin 0.4 mg oral capsule: 1 cap(s) orally once a day (at bedtime)

## 2021-07-28 NOTE — DISCHARGE NOTE PROVIDER - NSDCCPCAREPLAN_GEN_ALL_CORE_FT
PRINCIPAL DISCHARGE DIAGNOSIS  Diagnosis: Diarrhea  Assessment and Plan of Treatment: You were admitted to the hospital with diarrhea, which has since subsided. Please contintue to take your medication as prescribed. If you experience diarrhea, nausea, or vomiting, please contact your primary care provider.   Please make an appointment to see your primary care provider after your hospital discharge.      SECONDARY DISCHARGE DIAGNOSES  Diagnosis: Nausea and vomiting  Assessment and Plan of Treatment: Please contintue to take your medication as prescribed. If you experience diarrhea, nausea, or vomiting, please contact your primary care provider.   Please make an appointment to see your primary care provider after your hospital discharge.    Diagnosis: Hypomagnesemia  Assessment and Plan of Treatment: Please contintue to take your medication as prescribed. If you experience diarrhea, nausea, or vomiting, please contact your primary care provider.   Please make an appointment to see your primary care provider after your hospital discharge.    Diagnosis: Hypokalemia  Assessment and Plan of Treatment: Please contintue to take your medication as prescribed. If you experience diarrhea, nausea, or vomiting, please contact your primary care provider.   Please make an appointment to see your primary care provider after your hospital discharge.  If you experience any chest pain, shortness of breath, difficulty breathing or palpitations, please call [phone] an go directly to the nearest emergency room.

## 2021-07-28 NOTE — PROGRESS NOTE ADULT - ATTENDING COMMENTS
Continues to feel better.   No further inpatient workup at this time.  Recommend close follow up with established GI provider, Dr. Longoria.

## 2021-07-28 NOTE — DISCHARGE NOTE PROVIDER - NSDCFUSCHEDAPPT_GEN_ALL_CORE_FT
SHARIFA ADAMS ; 08/06/2021 ; [facility] [id number]  SHARIFA ADAMS ; 08/09/2021 ; [facility] [id number] [facility]

## 2021-07-28 NOTE — DISCHARGE NOTE PROVIDER - REASON FOR ADMISSION
electrolytes disturbance, weakness in setting of n/v/d electrolytes disturbance, generalized weakness

## 2021-07-28 NOTE — PROGRESS NOTE ADULT - ASSESSMENT
80M HTN, HLD, CVA (ataxia as residual,) and vertigo p/w generalized weakness, nausea, vomiting and diarrhea x4 days, found to be hypocalcemic, hypokalemic and hypomagnesemic, admitted to tele for electrolyte disturbance with nausea, vomiting and diarrhea.  Electrolyte issues presume most likely related to PPI.

## 2021-07-29 ENCOUNTER — TRANSCRIPTION ENCOUNTER (OUTPATIENT)
Age: 81
End: 2021-07-29

## 2021-07-29 ENCOUNTER — NON-APPOINTMENT (OUTPATIENT)
Age: 81
End: 2021-07-29

## 2021-07-29 VITALS
OXYGEN SATURATION: 96 % | HEART RATE: 79 BPM | TEMPERATURE: 98 F | RESPIRATION RATE: 18 BRPM | DIASTOLIC BLOOD PRESSURE: 75 MMHG | SYSTOLIC BLOOD PRESSURE: 107 MMHG

## 2021-07-29 LAB
ALBUMIN SERPL ELPH-MCNC: 3.3 G/DL — SIGNIFICANT CHANGE UP (ref 3.3–5)
ANION GAP SERPL CALC-SCNC: 12 MMOL/L — SIGNIFICANT CHANGE UP (ref 7–14)
BUN SERPL-MCNC: 14 MG/DL — SIGNIFICANT CHANGE UP (ref 7–23)
CALCIUM SERPL-MCNC: 8.6 MG/DL — SIGNIFICANT CHANGE UP (ref 8.4–10.5)
CHLORIDE SERPL-SCNC: 104 MMOL/L — SIGNIFICANT CHANGE UP (ref 98–107)
CO2 SERPL-SCNC: 24 MMOL/L — SIGNIFICANT CHANGE UP (ref 22–31)
COLLECT DURATION TIME UR: 24 HR — SIGNIFICANT CHANGE UP
CREAT SERPL-MCNC: 0.78 MG/DL — SIGNIFICANT CHANGE UP (ref 0.5–1.3)
GLUCOSE SERPL-MCNC: 90 MG/DL — SIGNIFICANT CHANGE UP (ref 70–99)
MAGNESIUM 24H UR-MRATE: 220.9 MG/24H — HIGH (ref 14.6–24.2)
MAGNESIUM SERPL-MCNC: 1.7 MG/DL — SIGNIFICANT CHANGE UP (ref 1.6–2.6)
PHOSPHATE SERPL-MCNC: 2.2 MG/DL — LOW (ref 2.5–4.5)
POTASSIUM SERPL-MCNC: 3.9 MMOL/L — SIGNIFICANT CHANGE UP (ref 3.5–5.3)
POTASSIUM SERPL-SCNC: 3.9 MMOL/L — SIGNIFICANT CHANGE UP (ref 3.5–5.3)
SODIUM SERPL-SCNC: 140 MMOL/L — SIGNIFICANT CHANGE UP (ref 135–145)
TOTAL VOLUME - 24 HOUR: 2350 ML — SIGNIFICANT CHANGE UP
URINE CREATININE CALCULATION: 1.8 G/24 H — SIGNIFICANT CHANGE UP (ref 1–2)

## 2021-07-29 PROCEDURE — 99239 HOSP IP/OBS DSCHRG MGMT >30: CPT

## 2021-07-29 RX ORDER — CALCIUM CARBONATE 500(1250)
2 TABLET ORAL
Qty: 120 | Refills: 0
Start: 2021-07-29 | End: 2021-08-27

## 2021-07-29 RX ORDER — SODIUM,POTASSIUM PHOSPHATES 278-250MG
1 POWDER IN PACKET (EA) ORAL ONCE
Refills: 0 | Status: COMPLETED | OUTPATIENT
Start: 2021-07-29 | End: 2021-07-29

## 2021-07-29 RX ORDER — MAGNESIUM OXIDE 400 MG ORAL TABLET 241.3 MG
1 TABLET ORAL
Qty: 24 | Refills: 0
Start: 2021-07-29 | End: 2021-08-05

## 2021-07-29 RX ORDER — POTASSIUM CHLORIDE 20 MEQ
20 PACKET (EA) ORAL ONCE
Refills: 0 | Status: COMPLETED | OUTPATIENT
Start: 2021-07-29 | End: 2021-07-29

## 2021-07-29 RX ADMIN — Medication 90 MILLIGRAM(S): at 05:47

## 2021-07-29 RX ADMIN — MIRABEGRON 25 MILLIGRAM(S): 50 TABLET, EXTENDED RELEASE ORAL at 08:20

## 2021-07-29 RX ADMIN — FINASTERIDE 5 MILLIGRAM(S): 5 TABLET, FILM COATED ORAL at 08:21

## 2021-07-29 RX ADMIN — Medication 1 PACKET(S): at 05:47

## 2021-07-29 RX ADMIN — Medication 81 MILLIGRAM(S): at 08:20

## 2021-07-29 RX ADMIN — MAGNESIUM OXIDE 400 MG ORAL TABLET 400 MILLIGRAM(S): 241.3 TABLET ORAL at 11:31

## 2021-07-29 RX ADMIN — Medication 20 MILLIEQUIVALENT(S): at 10:17

## 2021-07-29 RX ADMIN — ENOXAPARIN SODIUM 40 MILLIGRAM(S): 100 INJECTION SUBCUTANEOUS at 08:25

## 2021-07-29 RX ADMIN — Medication 1000 UNIT(S): at 08:20

## 2021-07-29 RX ADMIN — LISINOPRIL 20 MILLIGRAM(S): 2.5 TABLET ORAL at 05:47

## 2021-07-29 RX ADMIN — Medication 1 PACKET(S): at 10:16

## 2021-07-29 RX ADMIN — Medication 2 TABLET(S): at 05:47

## 2021-07-29 RX ADMIN — MAGNESIUM OXIDE 400 MG ORAL TABLET 400 MILLIGRAM(S): 241.3 TABLET ORAL at 08:24

## 2021-07-29 NOTE — PROGRESS NOTE ADULT - PROBLEM SELECTOR PLAN 6
- monitor BP  - c/w Lisinopril and Nifedipine

## 2021-07-29 NOTE — PROGRESS NOTE ADULT - PROBLEM SELECTOR PLAN 2
CTAP neg  - improved   - neg c diff and GI pcr  - GI eval appreciated, no further eval, OP GI f/u with Dr. Longoria
Ongoing, CTAP neg  - neg c diff and GI pcr  - wife concerned re recurrence without explanation  - GI eval
Ongoing, CTAP neg  - neg c diff and GI pcr  - GI eval appreciated, no further eval, OP GI f/u with Dr. Longoria

## 2021-07-29 NOTE — CHART NOTE - NSCHARTNOTEFT_GEN_A_CORE
Renal did not call back.  Called wife advised renal loss also possible albeit Mg now in normal range and thus hard to interpret 24 hour urine.  In setting of low Mg level >24 would be abnormal, I am unsure if urinary level of excretion in context of normal levels during collection is meaningful.  In any case wife advised not to dc PO Mg supplementation until f/u with PCP on 8/9.  Given his age unlikely that further w/u will lead to change in management (ie even if cause found tx will likely just be mg supplementation).  Presumably now that off PPI and on PO supplementation Mg level will stay in range.     Wife appreciative of call, will convey to patient.

## 2021-07-29 NOTE — PROGRESS NOTE ADULT - PROBLEM SELECTOR PLAN 1
Electrolyte disturbance in a setting of nausea, vomiting and diarrhea with possible medication side effect contributing - in the past was on PPI which caused hypomagnesemia and as a result hypocalcemia.   - d/c'd Protonix (pt denies any GI bleeding or GERD symptoms.) and may be causing hypomagnesemia which is then leading to low Ca and low K   - lytes much improved   - Ca better c/w 2 tabs BID  - can dc on mag ox 400 mg BID  - will add PPI to intolerance as patient/wife never advised of this previously , she has removed from med box  - urine mg elevated resulted s/p dc will d/w renal need for f/u for this
Electrolyte disturbance in a setting of nausea, vomiting and diarrhea with possible medication side effect contributing - in the past was on PPI which caused hypomagnesemia and as a result hypocalcemia.   - d/c'd protonix (pt denies any GI bleeding or GERD symptoms.)  - c/w tele until electrolytes wnl---likely in am   - c/w lyte repletion
Electrolyte disturbance in a setting of nausea, vomiting and diarrhea with possible medication side effect contributing - in the past was on PPI which caused hypomagnesemia and as a result hypocalcemia.   - d/c'd Protonix (pt denies any GI bleeding or GERD symptoms.) and may be causing hypomagnesemia which is then leading to low Ca and low K   - can dc tele  - c/w lyte repletion prn, much improved, requiring only K repletion for 3.4  - Ca better c/w 2 tabs BID, will not add calcitriol at this time as likely low Ca driven by low Mg  - can dc on mag ox 400 mg BID  - will add PPI to intolerance

## 2021-07-29 NOTE — PROGRESS NOTE ADULT - SUBJECTIVE AND OBJECTIVE BOX
Chief Complaint:  Patient is a 80y old  Male who presents with a chief complaint of diarrhea/nausea/vomiting; electrolyte issues (27 Jul 2021 17:16)      Interval Events: NAD. Patient reports feeling better. Had 2 BM yesterday, mix watery with stool formation; no bleeding. Denies abdominal pain/n/v, tolerating PO intake    ROS: All 12 point system except listed above were otherwise negative.    Allergies:  No Known Allergies        Hospital Medications:  aspirin  chewable 81 milliGRAM(s) Oral daily  atorvastatin 40 milliGRAM(s) Oral at bedtime  calcium carbonate   1250 mG (OsCal) 2 Tablet(s) Oral two times a day  cholecalciferol 1000 Unit(s) Oral daily  enoxaparin Injectable 40 milliGRAM(s) SubCutaneous every 24 hours  finasteride 5 milliGRAM(s) Oral daily  lisinopril 20 milliGRAM(s) Oral daily  magnesium sulfate  IVPB 1 Gram(s) IV Intermittent once  meclizine 25 milliGRAM(s) Oral every 8 hours PRN  mirabegron ER 25 milliGRAM(s) Oral daily  NIFEdipine XL 90 milliGRAM(s) Oral daily  potassium chloride    Tablet ER 20 milliEquivalent(s) Oral every 2 hours  tamsulosin 0.4 milliGRAM(s) Oral at bedtime      PMHX/PSHX:  CVA (Cerebral Infarction)    Secondary Seizure Disorder    PU (Peptic Ulcer)    Essential Hypertension    Hyperlipidemia    Vertigo    Status Post Inguinal Hernia Repair    Nephrolithiasis        Family history:  Acute myocardial infarction    PHYSICAL EXAM:   Vital Signs:  Vital Signs Last 24 Hrs  T(C): 37.2 (28 Jul 2021 04:30), Max: 37.2 (27 Jul 2021 16:30)  T(F): 98.9 (28 Jul 2021 04:30), Max: 99 (27 Jul 2021 16:30)  HR: 66 (28 Jul 2021 04:30) (66 - 78)  BP: 149/86 (28 Jul 2021 04:30) (103/68 - 149/86)  RR: 18 (28 Jul 2021 04:30) (18 - 18)  SpO2: 95% (28 Jul 2021 04:30) (94% - 96%)  Daily     Daily     PHYSICAL EXAM:     GENERAL:  Appears stated age, well-groomed, well-nourished, no distress  HEENT:  NC/AT,  conjunctivae clear and pink, no thyromegaly, nodules, adenopathy, no JVD, sclera -anicteric  CHEST:  Full & symmetric excursion, no increased effort, breath sounds clear  HEART:  Regular rhythm, S1, S2, no murmur/rub/S3/S4, no abdominal bruit, no edema  ABDOMEN:  Soft, non-tender, non-distended, normoactive bowel sounds,  no masses ,no hepato-splenomegaly, no signs of chronic liver disease  EXTREMITIES:  no cyanosis, clubbing or edema  SKIN:  No rash  NEURO:  Alert, oriented, no asterixis, no tremor, no encephalopathy    LABS:                        12.3   7.03  )-----------( 203      ( 28 Jul 2021 07:28 )             37.6     Mean Cell Volume: 87.2 fL (07-28-21 @ 07:28)    07-28    141  |  107  |  17  ----------------------------<  100<H>  3.4<L>   |  24  |  0.80    Ca    8.2<L>      28 Jul 2021 07:28  Phos  2.5     07-28  Mg     1.90     07-28    TPro  x   /  Alb  3.3  /  TBili  x   /  DBili  x   /  AST  x   /  ALT  x   /  AlkPhos  x   07-27    LIVER FUNCTIONS - ( 27 Jul 2021 18:04 )  Alb: 3.3 g/dL / Pro: x     / ALK PHOS: x     / ALT: x     / AST: x     / GGT: x                                       12.3   7.03  )-----------( 203      ( 28 Jul 2021 07:28 )             37.6                         12.6   7.10  )-----------( 208      ( 27 Jul 2021 08:44 )             39.7                         14.0   8.30  )-----------( 239      ( 26 Jul 2021 07:46 )             42.5                         15.5   8.71  )-----------( 265      ( 26 Jul 2021 01:09 )             46.4     Imaging:          
Patient is a 80y old  Male who presents with a chief complaint of electrolytes disturbance, generalized weakness    SUBJECTIVE / OVERNIGHT EVENTS:    Feels well, diarrhea resolved  No nausea/vomiting, tolerating meals  Feels overall well  no CP, SOB, f/c     MEDICATIONS  (STANDING):  aspirin  chewable 81 milliGRAM(s) Oral daily  atorvastatin 40 milliGRAM(s) Oral at bedtime  calcium carbonate   1250 mG (OsCal) 2 Tablet(s) Oral two times a day  cholecalciferol 1000 Unit(s) Oral daily  enoxaparin Injectable 40 milliGRAM(s) SubCutaneous every 24 hours  finasteride 5 milliGRAM(s) Oral daily  lisinopril 20 milliGRAM(s) Oral daily  magnesium oxide 400 milliGRAM(s) Oral three times a day with meals  mirabegron ER 25 milliGRAM(s) Oral daily  NIFEdipine XL 90 milliGRAM(s) Oral daily  potassium phosphate / sodium phosphate Powder (PHOS-NaK) 1 Packet(s) Oral two times a day  tamsulosin 0.4 milliGRAM(s) Oral at bedtime    MEDICATIONS  (PRN):  meclizine 25 milliGRAM(s) Oral every 8 hours PRN Dizziness    T(C): 36.7 (07-29-21 @ 11:56), Max: 36.8 (07-28-21 @ 20:30)  HR: 79 (07-29-21 @ 11:56) (66 - 80)  BP: 107/75 (07-29-21 @ 11:56) (107/75 - 135/91)  RR: 18 (07-29-21 @ 11:56) (18 - 18)  SpO2: 96% (07-29-21 @ 11:56) (96% - 96%)    I&O's Summary    28 Jul 2021 07:01  -  29 Jul 2021 07:00  --------------------------------------------------------  IN: 240 mL / OUT: 500 mL / NET: -260 mL    29 Jul 2021 07:01  -  29 Jul 2021 16:50  --------------------------------------------------------  IN: 0 mL / OUT: 300 mL / NET: -300 mL    PHYSICAL EXAM:  GENERAL: NAD, obese   CHEST/LUNG: Clear to auscultation bilaterally; No wheeze  HEART: Regular rate and rhythm; No murmurs, rubs, or gallops  ABDOMEN: Soft, Nontender, Nondistended; Bowel sounds present  EXTREMITIES:   warm and well perfused, No clubbing, cyanosis, or edema  PSYCH: AAOx3  NEUROLOGY: non-focal, no LE weakness, no changes in sensation   SKIN: No rashes or lesions    LABS:                        12.3   7.03  )-----------( 203 ( 28 Jul 2021 07:28 )             37.6     07-29    140  |  104  |  14  ----------------------------<  90  3.9   |  24  |  0.78    Ca    8.6      29 Jul 2021 07:36  Phos  2.2     07-29  Mg     1.70     07-29    TPro  x   /  Alb  3.3  /  TBili  x   /  DBili  x   /  AST  x   /  ALT  x   /  AlkPhos  x   07-29    Microbiology: Culture Results:   No enteric pathogens isolated.  (Stool culture examined for Salmonella,  Shigella, Campylobacter, Aeromonas, Plesiomonas,  Vibrio, E.coli O157 and Yersinia) (07-26 @ 15:45)  Culture Results:   <10,000 CFU/mL Normal Urogenital Skylar (07-26 @ 06:30)    Consultant(s) Notes Reviewed:    Care Discussed with Consultants/Other Providers:  
Patient is a 80y old  Male who presents with a chief complaint of electrolytes disturbance, generalized weakness    SUBJECTIVE / OVERNIGHT EVENTS:    No CP, SOB, f/c/n/v  Thinks still having some loose stool but overall better  Reports stress at home as wife wants to sell their house and move into daughter 2 family house, pt does not like idea of this and thinks daughter just wants to control him and his wife     MEDICATIONS  (STANDING):  aspirin  chewable 81 milliGRAM(s) Oral daily  atorvastatin 40 milliGRAM(s) Oral at bedtime  calcium carbonate   1250 mG (OsCal) 2 Tablet(s) Oral two times a day  cholecalciferol 1000 Unit(s) Oral daily  enoxaparin Injectable 40 milliGRAM(s) SubCutaneous every 24 hours  finasteride 5 milliGRAM(s) Oral daily  lisinopril 20 milliGRAM(s) Oral daily  magnesium oxide 400 milliGRAM(s) Oral two times a day with meals  mirabegron ER 25 milliGRAM(s) Oral daily  NIFEdipine XL 90 milliGRAM(s) Oral daily  potassium chloride   Powder 40 milliEquivalent(s) Oral two times a day  tamsulosin 0.4 milliGRAM(s) Oral at bedtime    MEDICATIONS  (PRN):  meclizine 25 milliGRAM(s) Oral every 8 hours PRN Dizziness    T(C): 36.8 (07-28-21 @ 12:30), Max: 37.2 (07-27-21 @ 16:30)  HR: 77 (07-28-21 @ 12:30) (66 - 79)  BP: 124/73 (07-28-21 @ 12:30) (114/68 - 149/86)  RR: 18 (07-28-21 @ 12:30) (18 - 18)  SpO2: 95% (07-28-21 @ 12:30) (94% - 96%)    I&O's Summary    28 Jul 2021 07:01  -  28 Jul 2021 16:11  --------------------------------------------------------  IN: 0 mL / OUT: 500 mL / NET: -500 mL    PHYSICAL EXAM:  GENERAL: NAD, well-developed  HEAD:  Atraumatic, Normocephalic  EYES: EOMI, PERRLA, conjunctiva and sclera clear  NECK: Supple, No JVD  CHEST/LUNG: Clear to auscultation bilaterally; No wheeze  HEART: Regular rate and rhythm; No murmurs, rubs, or gallops  ABDOMEN: Soft, Nontender, Nondistended; Bowel sounds present  EXTREMITIES:   warm and well perfused, No clubbing, cyanosis, or edema  PSYCH: AAOx3  NEUROLOGY: non-focal  SKIN: No rashes or lesions    LABS:                        12.3   7.03  )-----------( 203      ( 28 Jul 2021 07:28 )             37.6     07-28    141  |  107  |  17  ----------------------------<  100<H>  3.4<L>   |  24  |  0.80    Ca    8.2<L>      28 Jul 2021 07:28  Phos  2.5     07-28  Mg     1.90     07-28    TPro  x   /  Alb  3.4  /  TBili  x   /  DBili  x   /  AST  x   /  ALT  x   /  AlkPhos  x   07-28      Microbiology: Culture Results:   No enteric pathogens to date: Final culture pending (07-26 @ 19:01)  Culture Results:   <10,000 CFU/mL Normal Urogenital Skylar (07-26 @ 06:30)      Consultant(s) Notes Reviewed:    Care Discussed with Consultants/Other Providers:  
Patient is a 80y old  Male who presents with a chief complaint of electrolytes disturbance, generalized weakness    SUBJECTIVE / OVERNIGHT EVENTS:    NO CP, SOB, f/c/n/v  States last vomiting yesterday, still with loose stool here  No abdominal pain associated with these episodes  Started s/p eating sardines however wife ate same and no issue  no new meds     MEDICATIONS  (STANDING):  aspirin  chewable 81 milliGRAM(s) Oral daily  atorvastatin 40 milliGRAM(s) Oral at bedtime  calcium carbonate   1250 mG (OsCal) 1 Tablet(s) Oral two times a day  cholecalciferol 1000 Unit(s) Oral daily  enoxaparin Injectable 40 milliGRAM(s) SubCutaneous every 24 hours  finasteride 5 milliGRAM(s) Oral daily  lactated ringers. 1000 milliLiter(s) (100 mL/Hr) IV Continuous <Continuous>  lisinopril 20 milliGRAM(s) Oral daily  NIFEdipine XL 90 milliGRAM(s) Oral daily  tamsulosin 0.4 milliGRAM(s) Oral at bedtime    MEDICATIONS  (PRN):  meclizine 25 milliGRAM(s) Oral every 8 hours PRN Dizziness    T(C): 36.8 (21 @ 12:30), Max: 36.8 (21 @ 12:30)  HR: 68 (21 @ 12:30) (40 - 71)  BP: 103/68 (21 @ 12:30) (103/68 - 146/87)  RR: 18 (21 @ 12:30) (18 - 19)  SpO2: 95% (21 @ 12:30) (95% - 98%)    PHYSICAL EXAM:  GENERAL: NAD, obese   CHEST/LUNG: Clear to auscultation bilaterally; No wheeze  HEART: Regular rate and rhythm; No murmurs, rubs, or gallops  ABDOMEN: Soft, Nontender, Nondistended; Bowel sounds present  EXTREMITIES:   warm and well perfused, No clubbing, cyanosis, or edema  PSYCH: AAOx3  NEUROLOGY: non-focal, no LE weakness, no changes in sensation   SKIN: No rashes or lesions    LABS:                        12.6   7.10  )-----------( 208      ( 2021 08:44 )             39.7     07-27    146<H>  |  108<H>  |  19  ----------------------------<  85  3.6   |  28  |  0.96    Ca    7.6<L>      2021 08:44  Phos  3.3       Mg     1.70         TPro  6.6  /  Alb  3.7  /  TBili  1.2  /  DBili  x   /  AST  16  /  ALT  15  /  AlkPhos  91      Urinalysis Basic - ( 2021 06:41 )  Color: Yellow / Appearance: Clear / S.024 / pH: x  Gluc: x / Ketone: Small  / Bili: Negative / Urobili: <2 mg/dL   Blood: x / Protein: 30 mg/dL / Nitrite: Negative   Leuk Esterase: Negative / RBC: <5 /HPF / WBC <5 /HPF   Sq Epi: x / Non Sq Epi: 0-5 /HPF / Bacteria: Negative    Microbiology: Culture Results:   <10,000 CFU/mL Normal Urogenital Skylar ( @ 06:30)    Consultant(s) Notes Reviewed:  GI  Care Discussed with Consultants/Other Providers:

## 2021-07-29 NOTE — DISCHARGE NOTE NURSING/CASE MANAGEMENT/SOCIAL WORK - PATIENT PORTAL LINK FT
You can access the FollowMyHealth Patient Portal offered by [facility] by registering at the following website: [email or web address]. By joining [facility]’s FollowMyHealth portal, you will also be able to view your health information using other applications (apps) compatible with our system.

## 2021-07-29 NOTE — PROGRESS NOTE ADULT - PROBLEM SELECTOR PLAN 3
Nausea and vomiting subsided since pt in hospital. Now pt reports he is hungry, diarrhea still there.  - no further nausea or vomiting  - tolerating PO  - diarrhea improving
Nausea and vomiting subsided since pt in hospital. Now pt reports he is hungry, diarrhea still there.  - no further nausea or vomiting  - tolerating PO
Nausea and vomiting subsided since pt in hospital. Now pt reports he is hungry, diarrhea still there.  - dc IVF  - encourage PO

## 2021-07-29 NOTE — PROGRESS NOTE ADULT - PROBLEM SELECTOR PLAN 9
Lovenox ppx  Dispo planning, home PT  dispo time 35 min   Has new PCP f/u 8/9/21 should have lytes reassessed at that time  attempted to reach Caitlyn and Domingo (daughter and ALEJANDRINA per wife request yesterday), no answer on either cell #
Lovenox ppx  Dispo planning, home PT  dispo time 35 min   Has new PCP f/u 8/9/21 should have lytes reassessed at that time  care d/w wife
Lovenox ppx

## 2021-07-29 NOTE — PROGRESS NOTE ADULT - REASON FOR ADMISSION
diarrhea/nausea/vomiting; electrolyte issues
electrolytes disturbance, generalized weakness
hypoMag, weakness, diarrhea
electrolytes disturbance, generalized weakness

## 2021-07-29 NOTE — PROGRESS NOTE ADULT - PROBLEM SELECTOR PLAN 4
Weakness in a setting of Nausea + Vomiting with Electrolyte Disturbance  - PT eval, rec home PT
Weakness in a setting of Nausea + Vomiting with Electrolyte Disturbance  - correct electrolytes, IV fluids hydration  - PT eval
Weakness in a setting of Nausea + Vomiting with Electrolyte Disturbance  - PT eval, rec home PT

## 2021-08-01 LAB — PTH RELATED PROT SERPL-MCNC: <2 PMOL/L — SIGNIFICANT CHANGE UP

## 2021-08-06 ENCOUNTER — NON-APPOINTMENT (OUTPATIENT)
Age: 81
End: 2021-08-06

## 2021-08-06 ENCOUNTER — APPOINTMENT (OUTPATIENT)
Dept: CARDIOLOGY | Facility: CLINIC | Age: 81
End: 2021-08-06
Payer: MEDICARE

## 2021-08-06 VITALS
WEIGHT: 216 LBS | RESPIRATION RATE: 16 BRPM | BODY MASS INDEX: 30.92 KG/M2 | DIASTOLIC BLOOD PRESSURE: 71 MMHG | OXYGEN SATURATION: 96 % | HEIGHT: 70 IN | SYSTOLIC BLOOD PRESSURE: 137 MMHG | TEMPERATURE: 96.9 F | HEART RATE: 77 BPM

## 2021-08-06 DIAGNOSIS — R94.31 ABNORMAL ELECTROCARDIOGRAM [ECG] [EKG]: ICD-10-CM

## 2021-08-06 PROCEDURE — 99214 OFFICE O/P EST MOD 30 MIN: CPT

## 2021-08-06 PROCEDURE — 93000 ELECTROCARDIOGRAM COMPLETE: CPT

## 2021-08-06 NOTE — DISCUSSION/SUMMARY
[FreeTextEntry1] : 81 y/o male with H/O Hypertension, hyperlipidemia, Short of breath at rest episodically. Cardiac work up ordered, not done due to covid situation.  Not regular with BP meds.  Recently admitted twice to the hospital with diarrhea and multiple electrolyte problems. Carotid dopplers show 15-49% stenosis bilaterally. \par Mag repleted. Recheck levels with PCP tomorrow. Echo ordered.

## 2021-08-06 NOTE — HISTORY OF PRESENT ILLNESS
[FreeTextEntry1] : 79 y/o male with H/O Hypertension, hyperlipidemia, Short of breath at rest episodically. Cardiac work up ordered, not done due to covid situation.  Not regular with BP meds.  Recently admitted twice to the hospital with diarrhea and multiple electrolyte problems.

## 2021-08-06 NOTE — REVIEW OF SYSTEMS
[Fever] : no fever [Chills] : no chills [Blurry Vision] : no blurred vision [Earache] : no earache [Sore Throat] : no sore throat [SOB] : shortness of breath [Dyspnea on exertion] : dyspnea during exertion [Leg Claudication] : no intermittent leg claudication [Cough] : cough [Abdominal Pain] : no abdominal pain [Nausea] : nausea [Vomiting] : vomiting [Change in Appetite] : change in appetite [Urinary Frequency] : no change in urinary frequency [Joint Pain] : no joint pain [Myalgia] : no myalgia [Rash] : no rash [Skin Lesions] : no skin lesions [Dizziness] : no dizziness [Convulsions] : no convulsions [Limb Weakness (Paresis)] : no limb weakness (Paresis)

## 2021-08-06 NOTE — PHYSICAL EXAM
[Carotid Bruit] : carotid bruit [Normal S1, S2] : normal S1, S2 [Normal] : alert and oriented, normal memory [de-identified] : Right [de-identified] : ESM at base [de-identified] : Walks with cane

## 2021-08-09 ENCOUNTER — OUTPATIENT (OUTPATIENT)
Dept: OUTPATIENT SERVICES | Facility: HOSPITAL | Age: 81
LOS: 1 days | End: 2021-08-09

## 2021-08-09 ENCOUNTER — APPOINTMENT (OUTPATIENT)
Dept: INTERNAL MEDICINE | Facility: CLINIC | Age: 81
End: 2021-08-09
Payer: MEDICARE

## 2021-08-09 ENCOUNTER — RESULT REVIEW (OUTPATIENT)
Age: 81
End: 2021-08-09

## 2021-08-09 VITALS
HEART RATE: 78 BPM | BODY MASS INDEX: 30.92 KG/M2 | OXYGEN SATURATION: 96 % | HEIGHT: 70 IN | WEIGHT: 216 LBS | SYSTOLIC BLOOD PRESSURE: 120 MMHG | DIASTOLIC BLOOD PRESSURE: 76 MMHG

## 2021-08-09 VITALS — TEMPERATURE: 97.3 F

## 2021-08-09 DIAGNOSIS — K21.9 GASTRO-ESOPHAGEAL REFLUX DISEASE W/OUT ESOPHAGITIS: ICD-10-CM

## 2021-08-09 DIAGNOSIS — N20.0 CALCULUS OF KIDNEY: Chronic | ICD-10-CM

## 2021-08-09 DIAGNOSIS — N40.0 BENIGN PROSTATIC HYPERPLASIA WITHOUT LOWER URINARY TRACT SYMPMS: ICD-10-CM

## 2021-08-09 LAB
ANION GAP SERPL CALC-SCNC: 13 MMOL/L — SIGNIFICANT CHANGE UP (ref 7–14)
BUN SERPL-MCNC: 30 MG/DL — HIGH (ref 7–23)
CALCIUM SERPL-MCNC: 10.5 MG/DL — SIGNIFICANT CHANGE UP (ref 8.4–10.5)
CHLORIDE SERPL-SCNC: 104 MMOL/L — SIGNIFICANT CHANGE UP (ref 98–107)
CO2 SERPL-SCNC: 25 MMOL/L — SIGNIFICANT CHANGE UP (ref 22–31)
CREAT SERPL-MCNC: 1.17 MG/DL — SIGNIFICANT CHANGE UP (ref 0.5–1.3)
GLUCOSE SERPL-MCNC: 108 MG/DL — HIGH (ref 70–99)
MAGNESIUM SERPL-MCNC: 2.2 MG/DL — SIGNIFICANT CHANGE UP (ref 1.6–2.6)
PHOSPHATE SERPL-MCNC: 4.1 MG/DL — SIGNIFICANT CHANGE UP (ref 2.5–4.5)
POTASSIUM SERPL-MCNC: 4.9 MMOL/L — SIGNIFICANT CHANGE UP (ref 3.5–5.3)
POTASSIUM SERPL-SCNC: 4.9 MMOL/L — SIGNIFICANT CHANGE UP (ref 3.5–5.3)
SODIUM SERPL-SCNC: 142 MMOL/L — SIGNIFICANT CHANGE UP (ref 135–145)
VIT B12 SERPL-MCNC: 422 PG/ML — SIGNIFICANT CHANGE UP (ref 200–900)

## 2021-08-09 PROCEDURE — 99204 OFFICE O/P NEW MOD 45 MIN: CPT | Mod: GC

## 2021-08-09 RX ORDER — CALCIUM CARBONATE 500(1250)
500 TABLET ORAL
Refills: 0 | Status: DISCONTINUED | COMMUNITY
End: 2021-08-09

## 2021-08-09 RX ORDER — DUTASTERIDE 0.5 MG/1
0.5 CAPSULE, LIQUID FILLED ORAL DAILY
Refills: 0 | Status: DISCONTINUED | COMMUNITY
End: 2021-08-09

## 2021-08-09 RX ORDER — CHOLECALCIFEROL (VITAMIN D3) 25 MCG
25 TABLET ORAL DAILY
Refills: 0 | Status: ACTIVE | COMMUNITY

## 2021-08-09 RX ORDER — MIRABEGRON 50 MG/1
50 TABLET, FILM COATED, EXTENDED RELEASE ORAL
Refills: 0 | Status: ACTIVE | COMMUNITY

## 2021-08-09 RX ORDER — FINASTERIDE 5 MG/1
5 TABLET, FILM COATED ORAL DAILY
Refills: 0 | Status: ACTIVE | COMMUNITY

## 2021-08-09 RX ORDER — PANTOPRAZOLE 40 MG/1
40 TABLET, DELAYED RELEASE ORAL
Qty: 90 | Refills: 3 | Status: DISCONTINUED | COMMUNITY
Start: 2020-12-02 | End: 2021-08-09

## 2021-08-09 RX ORDER — TAMSULOSIN HYDROCHLORIDE 0.4 MG/1
0.4 CAPSULE ORAL
Refills: 0 | Status: ACTIVE | COMMUNITY

## 2021-08-09 NOTE — PHYSICAL EXAM
[No Acute Distress] : no acute distress [Well Nourished] : well nourished [Well Developed] : well developed [Well-Appearing] : well-appearing [Normal Sclera/Conjunctiva] : normal sclera/conjunctiva [PERRL] : pupils equal round and reactive to light [EOMI] : extraocular movements intact [Normal Oropharynx] : the oropharynx was normal [No Lymphadenopathy] : no lymphadenopathy [Supple] : supple [No Respiratory Distress] : no respiratory distress  [No Accessory Muscle Use] : no accessory muscle use [Clear to Auscultation] : lungs were clear to auscultation bilaterally [Normal Rate] : normal rate  [Regular Rhythm] : with a regular rhythm [Normal S1, S2] : normal S1 and S2 [No Murmur] : no murmur heard [Non Tender] : non-tender [Non-distended] : non-distended [No Masses] : no abdominal mass palpated [No HSM] : no HSM [Normal Bowel Sounds] : normal bowel sounds [Normal Posterior Cervical Nodes] : no posterior cervical lymphadenopathy [Normal Anterior Cervical Nodes] : no anterior cervical lymphadenopathy [No CVA Tenderness] : no CVA  tenderness [No Joint Swelling] : no joint swelling [Grossly Normal Strength/Tone] : grossly normal strength/tone [No Rash] : no rash [No Focal Deficits] : no focal deficits [Normal Affect] : the affect was normal [Alert and Oriented x3] : oriented to person, place, and time [Normal Mood] : the mood was normal [Normal Insight/Judgement] : insight and judgment were intact

## 2021-08-10 DIAGNOSIS — E78.00 PURE HYPERCHOLESTEROLEMIA, UNSPECIFIED: ICD-10-CM

## 2021-08-10 DIAGNOSIS — K21.9 GASTRO-ESOPHAGEAL REFLUX DISEASE WITHOUT ESOPHAGITIS: ICD-10-CM

## 2021-08-10 DIAGNOSIS — I10 ESSENTIAL (PRIMARY) HYPERTENSION: ICD-10-CM

## 2021-08-10 DIAGNOSIS — N40.0 BENIGN PROSTATIC HYPERPLASIA WITHOUT LOWER URINARY TRACT SYMPTOMS: ICD-10-CM

## 2021-08-10 DIAGNOSIS — E87.8 OTHER DISORDERS OF ELECTROLYTE AND FLUID BALANCE, NOT ELSEWHERE CLASSIFIED: ICD-10-CM

## 2021-08-10 NOTE — ASSESSMENT
[FreeTextEntry1] : Case discussed with Dr. Conway\par \par Luis Montes MD\par Internal Medicine PGY1\par [employer]

## 2021-08-10 NOTE — HISTORY OF PRESENT ILLNESS
[Spouse] : spouse [FreeTextEntry1] : Establish care [de-identified] : 81y/o with PMHx of HTN, HLD, peptic ulcer and BPH coming to [facility] practice. Patient was referred to the clinic from [facility]. Patient has been admitted to [facility] twice in the past month with complaints of n/v, and dizziness found to have electrolyte abnormalities (hypocalcemia and hypomagnesemia) presumed to be secondary to chronic PPI use for PUD. His symptoms resolved with correction of his electrolyte abnormality in the hospital. Patient's PPI was dc'd in the hospital. Since then, patient notes that he is doing quite well. Denies any HA, lightheaded, dizziness, n/v/d, constipation, muscle aches, leg edema, or any other symptoms.\par \par Patient has had 4 falls in the past year. He denies any precipitating episodes or aura events. Three of the falls were in his house and one fall was on the street - there were  nearby who were able to contact his wife. All of these falls were without any injury. Patient attributes his fall to electrolyte issues and states he fell due to weakness. Denies syncope or LOC. Since then, patient's wife has not been letting him go outside on his own. Patient notes he is quite distraught by it as he loves to play pool with his friends in [place]. He is being followed by cardiology and has an echo pending. \par \par Patient has a history of HTN. Patient's wife notes that his BP has been low 100s for past week. She contacted the cardiology ? nurse and was advised to skip his BP medication for past few days. Since then, his BP has been 110-120 systolic. The wife expressed concerns about his BP management.

## 2021-08-10 NOTE — HEALTH RISK ASSESSMENT
[Very Good] : ~his/her~ current health as very good [Good] : ~his/her~  mood as  good [No] : No [Two or more falls in past year] : Patient reported two or more falls in the past year [Assistive Device] : Patient uses an assistive device [2] : 1) Little interest or pleasure doing things for more than half of the days (2) [1] : 2) Feeling down, depressed, or hopeless for several days (1) [PHQ-2 Positive] : PHQ-2 Positive [1/2 of Days or More (2)] : 1.) Little interest or pleasure in doing things? Half the days or more [Several Days (1)] : 4.) Feeling tired or having little energy? Several days [Not at All (0)] : 8.) Moving or speaking so slowly that other people could have noticed, or the opposite, moving or speaking faster than usual? Not at all [Not at all] : How difficult have these problems made it for you to do your work, take care of things at home, or get along with people? Not at all [With Significant Other] : lives with significant other [Retired] : retired [High School] : high school [] :  [Fully functional (bathing, dressing, toileting, transferring, walking, feeding)] : Fully functional (bathing, dressing, toileting, transferring, walking, feeding) [Fully functional (using the telephone, shopping, preparing meals, housekeeping, doing laundry, using] : Fully functional and needs no help or supervision to perform IADLs (using the telephone, shopping, preparing meals, housekeeping, doing laundry, using transportation, managing medications and managing finances) [Smoke Detector] : smoke detector [Carbon Monoxide Detector] : carbon monoxide detector [Seat Belt] :  uses seat belt [Sunscreen] : uses sunscreen [With Patient/Caregiver] : , with patient/caregiver [Name: ___] : Health Care Proxy's Name: [unfilled]  [Relationship: ___] : Relationship: [unfilled] [DNR] : DNR [I will adhere to the patient's wishes.] : I will adhere to the patient's wishes. [Time Spent: ___ minutes] : Time Spent: [unfilled] minutes [] : No [de-identified] : 20min walking outside [de-identified] : Cane  [[id number]] : 3 [[id number]] : 4 [Change in mental status noted] : No change in mental status noted [Language] : denies difficulty with language [Behavior] : denies difficulty with behavior [Handling Complex Tasks] : denies difficulty handling complex tasks [Reasoning] : denies difficulty with reasoning [Spatial Ability and Orientation] : denies difficulty with spatial ability and orientation [Reports changes in hearing] : Reports no changes in hearing [Reports changes in vision] : Reports no changes in vision [Reports normal functional visual acuity (ie: able to read med bottle)] : Reports poor functional visual acuity.  [Reports changes in dental health] : Reports no changes in dental health [Guns at Home] : no guns at home [de-identified] : F [AdvancecareDate] : 08/21 [FreeTextEntry4] : H

## 2021-08-10 NOTE — REVIEW OF SYSTEMS
[Heartburn] : heartburn [Incontinence] : incontinence [Fever] : no fever [Chills] : no chills [Night Sweats] : no night sweats [Recent Change In Weight] : ~T no recent weight change [Vision Problems] : no vision problems [Earache] : no earache [Hearing Loss] : no hearing loss [Sore Throat] : no sore throat [Chest Pain] : no chest pain [Palpitations] : no palpitations [Lower Ext Edema] : no lower extremity edema [Orthopena] : no orthopnea [Shortness Of Breath] : no shortness of breath [Abdominal Pain] : no abdominal pain [Nausea] : no nausea [Diarrhea] : no diarrhea [Vomiting] : no vomiting [Dysuria] : no dysuria [Frequency] : no frequency [Muscle Pain] : no muscle pain [Skin Rash] : no skin rash [Headache] : no headache [Dizziness] : no dizziness [Confusion] : no confusion [Suicidal] : not suicidal [Anxiety] : no anxiety

## 2021-08-11 ENCOUNTER — NON-APPOINTMENT (OUTPATIENT)
Age: 81
End: 2021-08-11

## 2021-08-25 NOTE — ED ADULT NURSE NOTE - CHIEF COMPLAINT QUOTE
Prep Survey      Responses   [facility] facility patient discharged from?  [facility]   Is LACE score < 7 ?  No   Emergency Room discharge w/ pulse ox?  Yes   Eligibility  Readm Mgmt   Discharge diagnosis  COVID-19 pneumonia   Does the patient have one of the following disease processes/diagnoses(primary or secondary)?  COVID-19   Does the patient have Home health ordered?  No   Is there a DME ordered?  No   Prep survey completed?  Yes          Jennifer Doss RN         pt brought from home by EMS c/o N/V/D x 3 days worsening today, unable to tolerate PO intake today, previously eval'd for similar, as per EMS pt unable to tolerate PO medication today, HX HTN HLD

## 2021-09-03 ENCOUNTER — NON-APPOINTMENT (OUTPATIENT)
Age: 81
End: 2021-09-03

## 2021-09-14 ENCOUNTER — APPOINTMENT (OUTPATIENT)
Dept: INTERNAL MEDICINE | Facility: CLINIC | Age: 81
End: 2021-09-14

## 2021-09-17 ENCOUNTER — APPOINTMENT (OUTPATIENT)
Dept: INTERNAL MEDICINE | Facility: CLINIC | Age: 81
End: 2021-09-17
Payer: MEDICARE

## 2021-09-17 VITALS
BODY MASS INDEX: 30.06 KG/M2 | HEIGHT: 70 IN | TEMPERATURE: 97.8 F | OXYGEN SATURATION: 97 % | SYSTOLIC BLOOD PRESSURE: 130 MMHG | HEART RATE: 66 BPM | WEIGHT: 210 LBS | DIASTOLIC BLOOD PRESSURE: 70 MMHG

## 2021-09-17 DIAGNOSIS — Z23 ENCOUNTER FOR IMMUNIZATION: ICD-10-CM

## 2021-09-17 DIAGNOSIS — Z00.00 ENCOUNTER FOR GENERAL ADULT MEDICAL EXAMINATION W/OUT ABNORMAL FINDINGS: ICD-10-CM

## 2021-09-17 DIAGNOSIS — Z82.49 FAMILY HISTORY OF ISCHEMIC HEART DISEASE AND OTHER DISEASES OF THE CIRCULATORY SYSTEM: ICD-10-CM

## 2021-09-17 DIAGNOSIS — Z78.9 OTHER SPECIFIED HEALTH STATUS: ICD-10-CM

## 2021-09-17 PROCEDURE — 99204 OFFICE O/P NEW MOD 45 MIN: CPT | Mod: 25

## 2021-09-17 PROCEDURE — G0008: CPT

## 2021-09-17 PROCEDURE — 90715 TDAP VACCINE 7 YRS/> IM: CPT | Mod: GY

## 2021-09-17 PROCEDURE — 90472 IMMUNIZATION ADMIN EACH ADD: CPT

## 2021-09-17 PROCEDURE — 90662 IIV NO PRSV INCREASED AG IM: CPT

## 2021-09-17 RX ORDER — NIFEDIPINE 90 MG/1
90 TABLET, EXTENDED RELEASE ORAL DAILY
Qty: 90 | Refills: 1 | Status: DISCONTINUED | COMMUNITY
Start: 2020-12-02 | End: 2021-09-17

## 2021-09-17 RX ORDER — CALCIUM CARBONATE 500(1250)
500 TABLET ORAL
Qty: 60 | Refills: 0 | Status: ACTIVE | COMMUNITY

## 2021-09-19 PROBLEM — Z82.49 FAMILY HISTORY OF HYPERTENSION: Status: ACTIVE | Noted: 2021-09-19

## 2021-09-19 PROBLEM — Z82.49 FAMILY HISTORY OF MYOCARDIAL INFARCTION: Status: ACTIVE | Noted: 2021-09-19

## 2021-09-19 PROBLEM — Z78.9 CURRENT NON-DRINKER OF ALCOHOL: Status: ACTIVE | Noted: 2019-08-06

## 2021-09-19 PROBLEM — Z78.9 CURRENT NON-SMOKER: Status: ACTIVE | Noted: 2019-08-06

## 2021-09-19 NOTE — REVIEW OF SYSTEMS
[Recent Change In Weight] : ~T recent weight change [Negative] : Heme/Lymph [Abdominal Pain] : no abdominal pain [Nausea] : no nausea [Constipation] : no constipation [Vomiting] : no vomiting

## 2021-09-19 NOTE — ASSESSMENT
[FreeTextEntry1] : 80M PMH Obesity, HTN, HLD, BPH, GERD, dizziness who presents as a new patient to establish care.\par \par # Electrolyte abnormalities: low magnesium, phosphate, calcium, and potassium, all associated with weakness, poor appetite, vomiting and diarrhea. These are likely causes of his electrolyte abnormalities, however elevated calculated 24-hr urinary magnesium excretion in hospital labs (220.9 mg/24 hr on 7/27/21) suggest possible renal wasting etiology. Symptoms resolved at present.\par - CMP, Mg \par - PTH, ionized calcium, Vit D\par - Will trial an alternative formulation of Mg as MgOx has poor absorption and patient suffers from some loose stools. Alternatively, MgOx may help GERD more if this continues to be a problem.\par - C/w oyster shell calcium until re-checking Ca levels\par - Re-check weight in 5-10 weeks.\par \par # HCM: \par - Flu shot\par - Tdap\par - Getting colonoscopy\par - CMP, Lipids, CBC, A1c

## 2021-09-19 NOTE — PHYSICAL EXAM
[No Acute Distress] : no acute distress [Well-Appearing] : well-appearing [Normal Sclera/Conjunctiva] : normal sclera/conjunctiva [EOMI] : extraocular movements intact [Normal Outer Ear/Nose] : the outer ears and nose were normal in appearance [Normal Oropharynx] : the oropharynx was normal [Supple] : supple [No JVD] : no jugular venous distention [No Respiratory Distress] : no respiratory distress  [No Accessory Muscle Use] : no accessory muscle use [Clear to Auscultation] : lungs were clear to auscultation bilaterally [Normal Rate] : normal rate  [Regular Rhythm] : with a regular rhythm [Normal S1, S2] : normal S1 and S2 [No Edema] : there was no peripheral edema [Soft] : abdomen soft [Non Tender] : non-tender [No Joint Swelling] : no joint swelling [No Rash] : no rash [Coordination Grossly Intact] : coordination grossly intact [No Focal Deficits] : no focal deficits [Speech Grossly Normal] : speech grossly normal [Alert and Oriented x3] : oriented to person, place, and time

## 2021-09-19 NOTE — HISTORY OF PRESENT ILLNESS
[FreeTextEntry8] : 80M [facility] Obesity, HTN, HLD, BPH, GERD, dizziness who presents as a new patient to establish care.\par \par He was recently admitted to [facility] (6/28/21 - 7/6/21), and again on (7/26/21 - 7/29/21).\par On the first admission he was admitted for weakness and associated nausea/vomiting/diarrhea due to gastroenteritis. He had an ECHO and carotid ultrasound that were normal, BP monitored and controlled, but was found to have abnormal electrolytes (Mg, Ca, K, Ph)\par He felt a little better for 2 weeks than went back again with N/V and diarrhea and was again found to be severely hypomagnesemic and hypocalcemic. \par His wife also notes that he lost 5 lbs over the past 5 weeks, and his appetite has been lower. However, he is feeling stronger and energetic and ready to go play billiards with his friends. \par He has been taking oyster shell calcium and magnesium oxide since his discharge, he also discontinued his nifedipine and his pantoprazole as these were implicated as possible causes of his hypomagnesemia. \par He was having vomiting and diarrhea going into these hospitalizations, but lab measurement of urinary magnesium levels were > 200.\par \par Immunizations - Has had covid vaccine, has not had the flu shot. Has not had tetanus in the past 10 years. \par Colonoscopy in 2019, is going for another one after his hospitalizations and lack of appetite.

## 2021-09-20 LAB
25(OH)D3 SERPL-MCNC: 33.6 NG/ML
ALBUMIN SERPL ELPH-MCNC: 4.4 G/DL
ALDOSTERONE SERUM: 8.6 NG/DL
ALP BLD-CCNC: 96 U/L
ALT SERPL-CCNC: 20 U/L
ANION GAP SERPL CALC-SCNC: 12 MMOL/L
AST SERPL-CCNC: 14 U/L
BASOPHILS # BLD AUTO: 0.04 K/UL
BASOPHILS NFR BLD AUTO: 0.5 %
BILIRUB SERPL-MCNC: 0.9 MG/DL
BUN SERPL-MCNC: 24 MG/DL
CA-I SERPL-SCNC: 1.33 MMOL/L
CALCIUM SERPL-MCNC: 10.4 MG/DL
CALCIUM SERPL-MCNC: 10.9 MG/DL
CHLORIDE SERPL-SCNC: 104 MMOL/L
CHOLEST SERPL-MCNC: 132 MG/DL
CO2 SERPL-SCNC: 28 MMOL/L
CREAT SERPL-MCNC: 1.15 MG/DL
EOSINOPHIL # BLD AUTO: 0.16 K/UL
EOSINOPHIL NFR BLD AUTO: 1.9 %
ESTIMATED AVERAGE GLUCOSE: 103 MG/DL
GLUCOSE SERPL-MCNC: 88 MG/DL
HBA1C MFR BLD HPLC: 5.2 %
HCT VFR BLD CALC: 50.1 %
HDLC SERPL-MCNC: 49 MG/DL
HGB BLD-MCNC: 15.7 G/DL
IMM GRANULOCYTES NFR BLD AUTO: 0.4 %
LDLC SERPL CALC-MCNC: 57 MG/DL
LYMPHOCYTES # BLD AUTO: 1.73 K/UL
LYMPHOCYTES NFR BLD AUTO: 20.2 %
MAGNESIUM SERPL-MCNC: 1.8 MG/DL
MAN DIFF?: NORMAL
MCHC RBC-ENTMCNC: 28.5 PG
MCHC RBC-ENTMCNC: 31.3 GM/DL
MCV RBC AUTO: 91.1 FL
MONOCYTES # BLD AUTO: 0.71 K/UL
MONOCYTES NFR BLD AUTO: 8.3 %
NEUTROPHILS # BLD AUTO: 5.89 K/UL
NEUTROPHILS NFR BLD AUTO: 68.7 %
NONHDLC SERPL-MCNC: 83 MG/DL
PARATHYROID HORMONE INTACT: 23 PG/ML
PLATELET # BLD AUTO: 278 K/UL
POTASSIUM SERPL-SCNC: 5 MMOL/L
PROT SERPL-MCNC: 7.4 G/DL
RBC # BLD: 5.5 M/UL
RBC # FLD: 13.1 %
SODIUM SERPL-SCNC: 143 MMOL/L
TRIGL SERPL-MCNC: 129 MG/DL
WBC # FLD AUTO: 8.56 K/UL

## 2021-09-28 ENCOUNTER — RX RENEWAL (OUTPATIENT)
Age: 81
End: 2021-09-28

## 2021-09-28 ENCOUNTER — NON-APPOINTMENT (OUTPATIENT)
Age: 81
End: 2021-09-28

## 2021-09-28 LAB — PTH RELATED PROT SERPL-MCNC: <2 PMOL/L

## 2021-10-15 ENCOUNTER — APPOINTMENT (OUTPATIENT)
Dept: INTERNAL MEDICINE | Facility: CLINIC | Age: 81
End: 2021-10-15

## 2021-10-29 ENCOUNTER — APPOINTMENT (OUTPATIENT)
Dept: INTERNAL MEDICINE | Facility: CLINIC | Age: 81
End: 2021-10-29
Payer: MEDICARE

## 2021-10-29 VITALS
WEIGHT: 212 LBS | SYSTOLIC BLOOD PRESSURE: 128 MMHG | HEIGHT: 70 IN | DIASTOLIC BLOOD PRESSURE: 62 MMHG | OXYGEN SATURATION: 98 % | TEMPERATURE: 98.3 F | HEART RATE: 70 BPM | BODY MASS INDEX: 30.35 KG/M2

## 2021-10-29 DIAGNOSIS — E83.42 HYPOMAGNESEMIA: ICD-10-CM

## 2021-10-29 DIAGNOSIS — E83.51 HYPOCALCEMIA: ICD-10-CM

## 2021-10-29 PROCEDURE — 99213 OFFICE O/P EST LOW 20 MIN: CPT

## 2021-11-01 LAB
ANION GAP SERPL CALC-SCNC: 15 MMOL/L
BUN SERPL-MCNC: 29 MG/DL
CA-I SERPL-SCNC: 1.33 MMOL/L
CALCIUM SERPL-MCNC: 10.8 MG/DL
CHLORIDE SERPL-SCNC: 103 MMOL/L
CO2 SERPL-SCNC: 24 MMOL/L
CREAT SERPL-MCNC: 1.15 MG/DL
GLUCOSE SERPL-MCNC: 97 MG/DL
MAGNESIUM SERPL-MCNC: 1.8 MG/DL
POTASSIUM SERPL-SCNC: 5.1 MMOL/L
SODIUM SERPL-SCNC: 142 MMOL/L

## 2021-11-01 RX ORDER — MAGNESIUM OXIDE 241.3 MG/1000MG
400 TABLET ORAL 3 TIMES DAILY
Qty: 90 | Refills: 1 | Status: DISCONTINUED | COMMUNITY
Start: 2021-08-06 | End: 2021-11-01

## 2021-11-01 NOTE — ASSESSMENT
[FreeTextEntry1] : 81M [facility] Obesity, HTN, HLD, BPH, GERD, and evaluation s/p 2 hospitalizations for gastroenteritis with electrolyte abnormalities who presents for follow-up.\par \par # Electrolyte abnormalities: His hypocalcemia, hypomagnesemia, and hypokalemia s/p gastroenteritis hospitalization have resolved with his symptoms and supplementation of calcium and magnesium. Calcium intake was decreased following borderline elevation, while Mg supplementation was maintained. He has discontinued his pantoprazole and nifedipine per hospitalist recommendation as a potential cause of his hypomagnesemia. Of concern, was elevated urinary magnesium >200 measured in the hospital, suggesting magnesium wasting. \par - Check BMP, Mg, ionized Ca.\par - F/u with GI for colonoscopy\par - Maintain daily activity, monitoring weights. \par - Patient reports having a Nephrologist and has scheduled an appointment.

## 2021-11-01 NOTE — PHYSICAL EXAM
[No Acute Distress] : no acute distress [Well Nourished] : well nourished [Well-Appearing] : well-appearing [Normal Sclera/Conjunctiva] : normal sclera/conjunctiva [EOMI] : extraocular movements intact [Normal Outer Ear/Nose] : the outer ears and nose were normal in appearance [Normal Oropharynx] : the oropharynx was normal [No JVD] : no jugular venous distention [Supple] : supple [No Respiratory Distress] : no respiratory distress  [No Accessory Muscle Use] : no accessory muscle use [Clear to Auscultation] : lungs were clear to auscultation bilaterally [Normal Rate] : normal rate  [Regular Rhythm] : with a regular rhythm [Normal S1, S2] : normal S1 and S2 [No Edema] : there was no peripheral edema [Soft] : abdomen soft [Non Tender] : non-tender [Non-distended] : non-distended [Normal Anterior Cervical Nodes] : no anterior cervical lymphadenopathy [No Joint Swelling] : no joint swelling [No Rash] : no rash [No Skin Lesions] : no skin lesions [Speech Grossly Normal] : speech grossly normal [Memory Grossly Normal] : memory grossly normal

## 2021-11-01 NOTE — HISTORY OF PRESENT ILLNESS
[de-identified] : 81M [facility] Obesity, HTN, HLD, BPH, GERD, and evaluation s/p 2 hospitalizations for gastroenteritis with electrolyte abnormalities who presents for follow-up.\par \par He was seen for initial visit on 9/17/21 after two recent admissions to [facility] (6/28/21 - 7/6/21, 7/26/21 - 7/29/21) with symptoms of weakness associated with nausea/vomiting/diarrhea, and was found to have gastroenteritis and abnormal electrolytes (Mg, Ca, K, Ph), and with similar symptoms on second admission again found to be severely hypomagnesemic and hypocalcemic. \par His symptoms had resolved by our visit, but his wife had noted mild weight loss and reduced appetite, although he endorsed feeling strong and energetic. \par His Nifedipine and pantoprazole had been discontinued at admission, and he was prescribed Oyster shell calcium and magnesium oxide. Work-up in the hospital revealed urinary magnesium levels were > 200.\par \par Labs were checked at our first visit, demonstrating Mg in normal range and Ca 10.9 showing mild elevation (ionized Ca 1.33 at upper limit max). Aldosterone, PTH, PTHrp, and Vit D levels were all normal. He was recommended to decrease his oyster shell calcium to just 1 tablet daily, and continue on Mg supplement, and return for follow-up and repeat blood work. \par \par Today he notes he is feeling much stronger. Denies any episodes of vomiting or diarrhea. \par After receiving the results of our recent labs, which showed mildly elevated calcium levels (but ionized calcium was at the upper limit of normal) he decreased his calcium intake as recommended. His magnesium was at 1.8 on supplementation, and was recommended to continue with supplementation for the time being, which he has followed. \par \par His wife was concerned about weight loss at the last visit but his weight has not dropped since that time.

## 2021-11-12 ENCOUNTER — APPOINTMENT (OUTPATIENT)
Dept: CARDIOLOGY | Facility: CLINIC | Age: 81
End: 2021-11-12
Payer: MEDICARE

## 2021-11-12 VITALS
HEART RATE: 69 BPM | WEIGHT: 219.44 LBS | DIASTOLIC BLOOD PRESSURE: 84 MMHG | BODY MASS INDEX: 31.42 KG/M2 | OXYGEN SATURATION: 95 % | SYSTOLIC BLOOD PRESSURE: 149 MMHG | HEIGHT: 70 IN | TEMPERATURE: 98.3 F

## 2021-11-12 DIAGNOSIS — E87.8 OTHER DISORDERS OF ELECTROLYTE AND FLUID BALANCE, NOT ELSEWHERE CLASSIFIED: ICD-10-CM

## 2021-11-12 DIAGNOSIS — R06.02 SHORTNESS OF BREATH: ICD-10-CM

## 2021-11-12 DIAGNOSIS — R01.1 CARDIAC MURMUR, UNSPECIFIED: ICD-10-CM

## 2021-11-12 PROCEDURE — 99214 OFFICE O/P EST MOD 30 MIN: CPT

## 2021-11-12 PROCEDURE — 93000 ELECTROCARDIOGRAM COMPLETE: CPT

## 2021-11-12 NOTE — HISTORY OF PRESENT ILLNESS
[FreeTextEntry1] : 81 y/o male with H/O Hypertension, hyperlipidemia, Short of breath at rest episodically. Cardiac work up ordered, not done due to covid situation.  Not regular with BP meds. General medical condition improves under a new PCP.

## 2021-11-12 NOTE — DISCUSSION/SUMMARY
[FreeTextEntry1] : 79 y/o male with H/O Hypertension, hyperlipidemia, Short of breath at rest episodically. Cardiac work up ordered, not done due to covid situation.  Not regular with BP meds. General medical condition improves under a new PCP. Educated on compliance. Echo prior to NV.

## 2021-11-12 NOTE — PHYSICAL EXAM
[Normal S1, S2] : normal S1, S2 [No Rub] : no rub [No Gallop] : no gallop [Normal] : alert and oriented, normal memory [de-identified] : [employer] at [employer]

## 2021-11-19 NOTE — H&P ADULT - SKIN
Patient: Charo Adair Date: 11/18/2021   YOB: 1943 Attending: May Donahue MD   78 year old female Hospital Day: 12       Nephrology Hospital Progress Note    Subjective: No acute events overnight. She reports generalized pain.     Objective     Past Medical, Surgical, Family, Social Histories Reviewed  Allergies Reviewed     PHYSICAL EXAM    Patient Vitals for the past 24 hrs:   Visit Vitals  /68 (BP Location: RFA - Right forearm, Patient Position: Sitting)   Pulse 66   Temp 97.3 °F (36.3 °C) (Oral)   Resp 18   Ht 5' 2\" (1.575 m)   Wt 55.2 kg (121 lb 11.1 oz)   SpO2 99%   BMI 22.26 kg/m²     Intake/Output Summary:     Intake/Output Summary (Last 24 hours) at 11/18/2021 1921  Last data filed at 11/18/2021 1628  Gross per 24 hour   Intake 1570.5 ml   Output 700 ml   Net 870.5 ml       General: Awake, Alert, in no acute distress  Head: Normocephalic, without obvious abnormality, atraumatic  ENT: Nares normal, intact oral and nasal mucus membranes.  Eyes: conjunctivae/corneas clear  CVS: S1S2+, No rub, no murmur  Chest/Lungs: Respiratory effort normal, no crackles or wheezes  Abdomen: Soft, non distended, non tender, + bs  Neuro: Moving 4 extremities spontaneously, no focal deficits  Psychiatric: Appropriate mood and affect.   Skin: Warm, dry, intact.  No rashes.   Extremities:  Atraumatic, no bilateral lower extremity edema  Pulses: Palpable radial pulses    DATA:    Current Medications:  Scheduled:   • sodium chloride (PF)  10 mL Injection 2 times per day   • metoPROLOL succinate  25 mg Oral BID   • midodrine  7.5 mg Oral TID AC   • cyanocobalamin  1,000 mcg Oral Daily   • folic acid  1 mg Oral Daily   • cyanocobalamin  1,000 mcg Intramuscular Once per day on Thu   • sodium chloride (PF)  10 mL Injection 2 times per day   • sodium chloride (PF)  2 mL Intracatheter 2 times per day   • Potassium Standard Replacement Protocol   Does not apply See Admin Instructions   • Magnesium Standard  Replacement Protocol   Does not apply See Admin Instructions   • donepezil  5 mg Oral Nightly        Continuous Infusions:   • sodium chloride 0.9% infusion 75 mL/hr at 11/18/21 1741       Laboratory:  Recent Labs   Lab 11/18/21  0833 11/18/21  0612 11/17/21  0454 11/16/21  0441 11/16/21  0441   SODIUM  --  135 135  --  134*   POTASSIUM  --  3.6 4.2  --  3.7   CHLORIDE  --  103 101  --  101   CO2  --  27 25  --  28   BUN  --  7 8  --  8   CREATININE  --  0.52 0.68  --  0.55   GLUCOSE  --  93 91  --  95   WBC 11.1* 10.0 11.6*   < > 8.1   HGB 7.0* 6.6* 7.9*   < > 7.6*   HCT 20.5* 19.9* 23.1*   < > 22.5*    300 313   < > 248    < > = values in this interval not displayed.       Assessment/Plan:    Hyponatremia:  Improved  Secondary to both inapropriate secretion of ADH in the setting of nausea, leg discomfort/ and appropriate secretion of ADH  in the setting of mild hypovolemia  Sodium level within normal limit at 135<-- 135<--134 <--134 <--132 <--131<--129 (11/12)  Discontinue IV normal saline at 75 cc an hour  Continue pain control with Tylenol 650 mg q6h  Continue Zofran 4 mg q12h PRN nausea.    Syncope  Orthostatic Hypotension  Blood pressures improved to the 120 is 130s range  Continue midodrine 7.5 mg t.i.d.  OFF Amlodipine 5 mg daily   On metoprolol xl 25 mg b.i.d.   EP consulted. PPM interrogated and appeared to be functioning well    Chronic diastolic heart failure  Afib with RVR  Echo Nov 2021 showed normal LVEF 70%, grade 1 diastolic dysfunciton, mild LVOT obstruction  No signs of acute decompensation   Cardiology following, no plans for ischemic workup at this time  On metoprolol XL 25 mg b.i.d.    Anemia of chronic disease  H&H are low at 7 and 20.5  Folate level was low at 2.5  Ferritin was elevated at 494  B12 low at 195  Will check iron panel  Continue folate 1 mg daily    Left upper extremity DVT   Anticoagulation currently on hold due to worsening anemia  Management as per primary  team    Alzheimer's dementia, sever major depression  Management per Neurology and primary teams     Plan of care was discussed with Dr. Alford.    JC Patiño  Nephrology  11/18/2021       I, Huyen Alford MD, have personally taken a history, performed a physical examination of the patient, reviewed the clinical data, labs, imagings. I have reviewed and edited the above note as needed. I have personally formulated the clinical impression and recommendations as stated.     Patient reports left arm pain and swelling.  She gets irritated very easily and always angry.   Patient looks euvolemic on physical exam  She continues to have significant left arm swelling.  Anticoagulation on hold  Hyponatremia has resolved  Agree with stopping IV normal saline  Continue midodrine 7.5 mg t.i.d.      Huyen Alford MD  11/18/2021             No lesions; no rash

## 2021-12-06 ENCOUNTER — NON-APPOINTMENT (OUTPATIENT)
Age: 81
End: 2021-12-06

## 2021-12-06 ENCOUNTER — APPOINTMENT (OUTPATIENT)
Dept: GASTROENTEROLOGY | Facility: CLINIC | Age: 81
End: 2021-12-06

## 2022-01-06 NOTE — HISTORY OF PRESENT ILLNESS
[de-identified] : 81M PMH Obesity, HTN, HLD, BPH, GERD, and evaluation s/p 2 hospitalizations for gastroenteritis with electrolyte abnormalities who presents for follow-up.\par

## 2022-01-07 ENCOUNTER — APPOINTMENT (OUTPATIENT)
Dept: INTERNAL MEDICINE | Facility: CLINIC | Age: 82
End: 2022-01-07

## 2022-01-20 ENCOUNTER — APPOINTMENT (OUTPATIENT)
Dept: INTERNAL MEDICINE | Facility: CLINIC | Age: 82
End: 2022-01-20

## 2022-01-21 NOTE — ED ADULT NURSE NOTE - CAS EDN DISCHARGE ASSESSMENT
I Kian Dillon MD have personally  seen and examined the patient today and have noted the findings and formulated the plan of care.  I Kian Dillon MD have personally seen and examined the patient at bedside today at  11am I Kian Dillon MD have seen and examined the patient today and agree with  the  evaluation, assessment and plan of the surgical house officer  ROHINI Dillon MD have personally seen and examined the patient at bedside today at 7pm I Kian Dillon MD have personally  seen and examined the patient today and have noted the findings and formulated the plan of care.  I Kian Dillon MD have personally seen and examined the patient at bedside today at  8am I Kian Dillon MD have personally  seen and examined the patient today and have noted the findings and formulated the plan of care.  I Kian Dillon MD have personally seen and examined the patient at bedside today at  2 pm I Kian Dillon MD have seen and examined the patient today and agree with  the  evaluation, assessment and plan of the surgical house officer  ROHINI Dillon MD have personally seen and examined the patient at bedside today at 7pm I Kian Dillon MD have seen and examined the patient today and agree with  the  evaluation, assessment and plan of the surgical house officer  ROHINI Dillon MD have personally seen and examined the patient at bedside today at 8am I Kian Dillon MD have personally  seen and examined the patient today and have noted the findings and formulated the plan of care.  I Kian Dillon MD have personally seen and examined the patient at bedside today at  7pm I Kian Dillon MD have personally  seen and examined the patient today and have noted the findings and formulated the plan of care.  I Kian Dillon MD have personally seen and examined the patient at bedside today at  2 pm I Kian Dillon MD have seen and examined the patient today and agree with  the  evaluation, assessment and plan of the surgical house officer  ROHINI Dillon MD have personally seen and examined the patient at bedside today at 8am I Kian Dillon MD have seen and examined the patient today and agree with  the  evaluation, assessment and plan of the surgical house officer  ROHINI Dillon MD have personally seen and examined the patient at bedside today at 5  pm I Kian Dillon MD have seen and examined the patient today and agree with  the  evaluation, assessment and plan of the surgical house officer  ROHINI Dillon MD have personally seen and examined the patient at bedside today at 8am Alert and oriented to person, place and time

## 2022-01-27 ENCOUNTER — RX RENEWAL (OUTPATIENT)
Age: 82
End: 2022-01-27

## 2022-01-27 RX ORDER — ASPIRIN 81 MG/1
81 TABLET, COATED ORAL
Qty: 90 | Refills: 0 | Status: ACTIVE | COMMUNITY
Start: 2022-01-27 | End: 1900-01-01

## 2022-02-16 ENCOUNTER — OUTPATIENT (OUTPATIENT)
Dept: OUTPATIENT SERVICES | Facility: HOSPITAL | Age: 82
LOS: 1 days | End: 2022-02-16

## 2022-02-16 ENCOUNTER — APPOINTMENT (OUTPATIENT)
Dept: CV DIAGNOSITCS | Facility: HOSPITAL | Age: 82
End: 2022-02-16
Payer: MEDICARE

## 2022-02-16 DIAGNOSIS — N20.0 CALCULUS OF KIDNEY: Chronic | ICD-10-CM

## 2022-02-16 DIAGNOSIS — R01.1 CARDIAC MURMUR, UNSPECIFIED: ICD-10-CM

## 2022-02-16 DIAGNOSIS — R06.02 SHORTNESS OF BREATH: ICD-10-CM

## 2022-02-16 PROCEDURE — 93306 TTE W/DOPPLER COMPLETE: CPT | Mod: 26

## 2022-03-11 ENCOUNTER — APPOINTMENT (OUTPATIENT)
Dept: CARDIOLOGY | Facility: CLINIC | Age: 82
End: 2022-03-11
Payer: MEDICARE

## 2022-03-11 VITALS
HEART RATE: 68 BPM | SYSTOLIC BLOOD PRESSURE: 160 MMHG | WEIGHT: 232 LBS | OXYGEN SATURATION: 96 % | DIASTOLIC BLOOD PRESSURE: 90 MMHG | RESPIRATION RATE: 16 BRPM | BODY MASS INDEX: 33.21 KG/M2 | HEIGHT: 70 IN | TEMPERATURE: 98 F

## 2022-03-11 DIAGNOSIS — E66.9 OBESITY, UNSPECIFIED: ICD-10-CM

## 2022-03-11 DIAGNOSIS — I10 ESSENTIAL (PRIMARY) HYPERTENSION: ICD-10-CM

## 2022-03-11 DIAGNOSIS — I35.1 NONRHEUMATIC AORTIC (VALVE) INSUFFICIENCY: ICD-10-CM

## 2022-03-11 DIAGNOSIS — R01.1 CARDIAC MURMUR, UNSPECIFIED: ICD-10-CM

## 2022-03-11 DIAGNOSIS — E78.00 PURE HYPERCHOLESTEROLEMIA, UNSPECIFIED: ICD-10-CM

## 2022-03-11 DIAGNOSIS — R42 DIZZINESS AND GIDDINESS: ICD-10-CM

## 2022-03-11 PROCEDURE — 93000 ELECTROCARDIOGRAM COMPLETE: CPT

## 2022-03-11 PROCEDURE — 99214 OFFICE O/P EST MOD 30 MIN: CPT

## 2022-03-11 RX ORDER — AMLODIPINE BESYLATE 5 MG/1
5 TABLET ORAL DAILY
Qty: 90 | Refills: 1 | Status: ACTIVE | COMMUNITY
Start: 2022-03-11 | End: 1900-01-01

## 2022-03-11 NOTE — PHYSICAL EXAM
[Normal S1, S2] : normal S1, S2 [No Rub] : no rub [No Gallop] : no gallop [Normal] : alert and oriented, normal memory [de-identified] : [employer] at [employer]

## 2022-03-11 NOTE — DISCUSSION/SUMMARY
[FreeTextEntry1] : 80 y/o male with H/O Hypertension, hyperlipidemia, Short of breath at rest episodically. Cardiac work up ordered, not done due to covid situation. Came for follow up. Feels better since lisinopril started. \par Echo normal. Add amlodipine for BP control. RTC 3-4 months.

## 2022-03-11 NOTE — HISTORY OF PRESENT ILLNESS
[FreeTextEntry1] : 82 y/o male with H/O Hypertension, hyperlipidemia, Short of breath at rest episodically. Cardiac work up ordered, not done due to covid situation. Came for follow up. Feels better since lisinopril started.

## 2022-03-11 NOTE — CARDIOLOGY SUMMARY
[de-identified] : 3/11/22 [de-identified] : Normal LV/ RV function with mild to moderate AI Dilution (U/0.1 Cc): 0.8

## 2022-03-11 NOTE — REVIEW OF SYSTEMS
[Fever] : no fever [Chills] : no chills [Blurry Vision] : no blurred vision [Earache] : no earache [Sore Throat] : no sore throat [SOB] : no shortness of breath [Dyspnea on exertion] : not dyspnea during exertion [Leg Claudication] : no intermittent leg claudication [Cough] : no cough [Abdominal Pain] : no abdominal pain [Nausea] : no nausea [Vomiting] : no vomiting [Change in Appetite] : no change in appetite [Urinary Frequency] : no change in urinary frequency [Joint Pain] : no joint pain [Myalgia] : no myalgia [Rash] : no rash [Skin Lesions] : no skin lesions [Dizziness] : no dizziness [Convulsions] : no convulsions [Limb Weakness (Paresis)] : no limb weakness (Paresis)

## 2022-03-16 RX ORDER — MAGNESIUM CHLORIDE 71.5 MG
71.5-119 TABLET, DELAYED RELEASE (ENTERIC COATED) ORAL DAILY
Qty: 60 | Refills: 0 | Status: ACTIVE | COMMUNITY
Start: 2021-09-17 | End: 1900-01-01

## 2022-04-28 NOTE — HISTORY OF PRESENT ILLNESS
[de-identified] : 81M PMH Obesity, HTN, HLD, BPH, GERD, and evaluation s/p 2 hospitalizations for gastroenteritis with electrolyte abnormalities who presents for follow-up.\par

## 2022-04-29 ENCOUNTER — APPOINTMENT (OUTPATIENT)
Dept: INTERNAL MEDICINE | Facility: CLINIC | Age: 82
End: 2022-04-29

## 2022-05-25 RX ORDER — LISINOPRIL 20 MG/1
20 TABLET ORAL
Qty: 90 | Refills: 1 | Status: ACTIVE | COMMUNITY
Start: 1900-01-01 | End: 1900-01-01

## 2022-05-31 ENCOUNTER — NON-APPOINTMENT (OUTPATIENT)
Age: 82
End: 2022-05-31

## 2022-06-22 ENCOUNTER — APPOINTMENT (OUTPATIENT)
Dept: DERMATOLOGY | Facility: CLINIC | Age: 82
End: 2022-06-22
Payer: MEDICARE

## 2022-06-22 PROCEDURE — 99204 OFFICE O/P NEW MOD 45 MIN: CPT

## 2022-06-29 NOTE — H&P ADULT - NSTOBACCOSCREENHP_GEN_A_NCS
June 29, 2022      [address] [place] [address]  [id number]    [facility] [id number]        Dear ,    We are writing to inform you of your test results.    Cholesterol is mildly high but dramatically better than in the past. I am very happy with the improvement. Continue the physical activity. For healthy eating, consider the Mediterranean diet.  This means eating a lot of fruits and vegetables and consuming healthy oils (olive oil, nuts, seeds, avocado, fatty unfried fish such as salmon). I have enclosed some information about this diet.       Otherwise, blood sugar screening test for diabetes came back high and needing further testing. You have either diabetes or pre-diabetes. Please set up a lab-only appt to do an A1C (non-fasting) lab.    Resulted Orders   Glucose   Result Value Ref Range    Glucose 122 (H) 70 - 99 mg/dL    Patient Fasting > 8hrs? Yes    Lipid panel reflex to direct LDL Non-fasting   Result Value Ref Range    Cholesterol 231 (H) <200 mg/dL    Triglycerides 239 (H) <150 mg/dL    Direct Measure HDL 47 (L) >=50 mg/dL    LDL Cholesterol Calculated 136 (H) <=100 mg/dL    Non HDL Cholesterol 184 (H) <130 mg/dL    Patient Fasting > 8hrs? Yes     Narrative    Cholesterol  Desirable:  <200 mg/dL    Triglycerides  Normal:  Less than 150 mg/dL  Borderline High:  150-199 mg/dL  High:  200-499 mg/dL  Very High:  Greater than or equal to 500 mg/dL    Direct Measure HDL  Female:  Greater than or equal to 50 mg/dL   Male:  Greater than or equal to 40 mg/dL    LDL Cholesterol  Desirable:  <100mg/dL  Above Desirable:  100-129 mg/dL   Borderline High:  130-159 mg/dL   High:  160-189 mg/dL   Very High:  >= 190 mg/dL    Non HDL Cholesterol  Desirable:  130 mg/dL  Above Desirable:  130-159 mg/dL  Borderline High:  160-189 mg/dL  High:  190-219 mg/dL  Very High:  Greater than or equal to 220 mg/dL       If you have any questions or concerns, please call the clinic at the number listed above.        Sincerely,      Susana Taylor PA-C           No

## 2022-07-05 ENCOUNTER — APPOINTMENT (OUTPATIENT)
Dept: DERMATOLOGY | Facility: CLINIC | Age: 82
End: 2022-07-05

## 2022-07-05 ENCOUNTER — NON-APPOINTMENT (OUTPATIENT)
Age: 82
End: 2022-07-05

## 2022-07-05 PROCEDURE — 17311 MOHS 1 STAGE H/N/HF/G: CPT

## 2022-07-05 PROCEDURE — 15260 FTH/GFT FR N/E/E/L 20 SQCM/<: CPT

## 2022-07-05 PROCEDURE — 17312 MOHS ADDL STAGE: CPT

## 2022-07-07 ENCOUNTER — APPOINTMENT (OUTPATIENT)
Dept: DERMATOLOGY | Facility: CLINIC | Age: 82
End: 2022-07-07

## 2022-07-07 PROCEDURE — 99024 POSTOP FOLLOW-UP VISIT: CPT

## 2022-07-08 ENCOUNTER — APPOINTMENT (OUTPATIENT)
Dept: CARDIOLOGY | Facility: CLINIC | Age: 82
End: 2022-07-08

## 2022-07-11 ENCOUNTER — APPOINTMENT (OUTPATIENT)
Dept: DERMATOLOGY | Facility: CLINIC | Age: 82
End: 2022-07-11

## 2022-07-11 DIAGNOSIS — C44.92 SQUAMOUS CELL CARCINOMA OF SKIN, UNSPECIFIED: ICD-10-CM

## 2022-07-11 PROCEDURE — 99024 POSTOP FOLLOW-UP VISIT: CPT

## 2022-08-18 ENCOUNTER — APPOINTMENT (OUTPATIENT)
Dept: DERMATOLOGY | Facility: CLINIC | Age: 82
End: 2022-08-18

## 2022-09-01 ENCOUNTER — NON-APPOINTMENT (OUTPATIENT)
Age: 82
End: 2022-09-01

## 2022-09-01 ENCOUNTER — RX RENEWAL (OUTPATIENT)
Age: 82
End: 2022-09-01

## 2022-09-01 RX ORDER — PANTOPRAZOLE 40 MG/1
40 TABLET, DELAYED RELEASE ORAL
Qty: 90 | Refills: 3 | Status: ACTIVE | COMMUNITY
Start: 2021-05-04 | End: 1900-01-01

## 2022-09-18 NOTE — PATIENT PROFILE ADULT - DO YOU LACK THE NECESSARY SUPPORT TO HELP YOU COPE WITH LIFE CHALLENGES?
Your symptoms are most consistent with a migraine. There is no evidence of a stroke or other concerns on your MRI. Continue to push fluids, rest. Take excedrin, tylenol, or ibuprofen as needed. Follow up with your PCP Monday. Refer to hand out above and return with new or worsening symptoms. Referral to neurology was placed.  
no

## 2022-10-07 NOTE — ED PROVIDER NOTE - CROS ED NEURO ALL NEG
Bilateral ear lavage performed using Retrotope earwash system. Maximum effort required. Moderate  results obtained. Total time: 40.         negative...

## 2024-03-20 NOTE — ED ADULT TRIAGE NOTE - IDEAL BODY WEIGHT(KG)
[Clinical] : TNM Stage: c [III] : III [TTNM] : 4 [NTNM] : 0 [MTNM] : 0 [de-identified] : [id number] [[place] [id number]] : 1885 [de-identified] : Pancreas 73

## 2025-04-23 NOTE — PATIENT PROFILE ADULT - LEGAL HELP
PA Scopolamine 1MG/3DAYS 72 hr patches APPROVED     Date(s) approved 4/8/25    Case # [id number]    Patient advised by          []UK Work Studyhart Message  [x]Phone call   []LMOM  []L/M to call office as no active Communication consent on file  []Unable to leave detailed message as VM not approved on Communication consent       Pharmacy advised by    [x]Fax  []Phone call  []Secure Chat    Specialty Pharmacy    []        no

## 2025-06-24 NOTE — H&P ADULT - ASSESSMENT
[facility] Internal Medicine   Progress Note                    Assessment/Plan:        Cardiogenic shock   Ischemic cardiomyopathy  On aspirin, statin, Plavix  management by advance heart failure  CAD multivessel disease cardiology and cardiothoracic surgery on consultation deciding between CABG versus PCI risk versus benefit conversation with the patient  Patient declines CABG, he is okay with PCI  S/p sheila  06/16: Status post PCI: Intravascular lithotripsy  Successful treatment of the LM into mid LAD using 120 pulses of IVL using a 3.5mm Shockwave balloon.  Successful PCI of the proximal to mid LAD and placement of a 3.0 x 38mm Synergy Xd MONICA  Successful PCI of the LM into proximal LAD and placement of a 4.0 x 32mm Synergy Xd MONICA post dilated with a 4.0, 5.0 mm NC and POT of the left main with a 6.0mm NC with excellent results and DANIAL 3 flow pre and post stenting.    Successful PCI of the LCX and placement of 3.0 x 38mm Synergy Xd MONICA deployed in the mid LCX into OM1 and placement of a 3.5 x 16mm MONICA deployed in the Ostial LCX into proximal LCX.  The ostial LCX stent was crushed (KD-Crush) with successful kissing balloon angioplasty; but unable to perform final Kissing balloon angioplaty after the LM stent was placed.    On plavix  Intra-aortic balloon pump Dc'd  Off milrinone drip  Antwan quan  GDMT: Lasix, metoprolol, Entresto, Aldactone  06/21  Gdmt on hold 2/2 htn  06/22  Resumed metoprolol Entresto Aldactone per cardiology  Patient endorses intermittent dizziness especially when standing  Orthostatics negative when checked though patient did have some dizziness  GDMT to be titrated per cardiology  06/23  Entresto changed to valsartan  Continue metoprolol and aldactone    Htn  Gdmt being titrated limited by hypotension      AFIB on Amio cardiology following   UFH drip dc  Eliquis resumed  Amiodarone      Diabetes on protocol insulin Endocrinology on consult  A1c May 2025: 7.2  Add lispro 5 tid       DARLENE  on CKD stage III  Creatinine downtrending  Mild hyponatremia, closely monitor  improved  Cr uptrending but within range      Heme/Onc  IgA Myeloma (in remission)  Outpatient therapy with lenalidomide - held given DARLENE and not taking at home, per patient  CT with incidental findings of L1 and T11 lytic lesions.  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  In remission  CT scan shows right lower lobe nodule opacity oncology recommends CT scan every 6 months for surveillance    Thrombocytopenia  Likely consumptive  Check PF4: weakly +  Catalina negative  Message sent to hematology for f/u      Rhinovirus/enterovirus +  Pt asymptomatic  States chronic cough  Monitor    Incidental finding on CT abdomen pelvis of sigmoid colon narrowing surgery consulted to evaluate further, no ileus or SBO.  Patient passing bowel movements  Recommend outpatient colonoscopy when more stable    06/24  Medically cleared for discharge however patient states his family is out of town until Friday and has no way of getting inside his home  Discussed with charge RN will discuss with social work in the morning      DVT Prophylaxis:   Current Active Medications for DVT Prophylaxis (From admission, onward)           Stop     apixaBAN (ELIQUIS) tablet 5 mg  5 mg,   Oral,   2 times per day         --                  Diet: Cardiac, Consistent Carb Moderate (45-75 Gm/Meal); Yes, Medical Nutrition Management by Rd (Registered Dietitian) Diet    CODE STATUS:   Code Status: Full Resuscitation    Physician Notification:  Consultants notified of patient via Perfect Serve.  Communication: with patient, nurse     I spent 50 MINS ON THIS PATIENTS CARE TODAY. This includes the following: Reviewed all vitals, medications, new orders, I/O, labs, micro, radiology, nurses notes, pertinent consultant notes which are  reflected in assessment and plan.This does not include time spent on other items of care such as smoking cessation counseling, prolonged care time, and or advanced care planning if applicable.   (Level 1 PN: 25 min, Level 2 PN: 35 min, Level 3 PN: 50 min)     Primary Care Physician  Benitez Mancera MD        [facility] [place] [facility]  6/24/2025 4:37 PM          Subjective:  Patient seen and examined by me.             Feels better today  Denies dizziness with standing/walking        14 point Review of systems is negative except for as noted above.     I/O's    Intake/Output Summary (Last 24 hours) at 6/24/2025 1637  Last data filed at 6/24/2025 0900  Gross per 24 hour   Intake 118 ml   Output 450 ml   Net -332 ml         ALLERGIES:  Patient has no known allergies.     No data recorded  No data recorded          HOSPITAL MEDS  Current Facility-Administered Medications   Medication Dose Route Frequency Provider Last Rate Last Admin    valsartan (DIOVAN) tablet 40 mg  40 mg Oral BID Armando Villavicencio MD   40 mg at 06/24/25 0836    insulin lispro (ADMELOG,HumaLOG) - Scheduled Mealtime Dose   Subcutaneous TID  Israel Frazier MD   5 Units at 06/24/25 1159    [Held by provider] furosemide (LASIX) tablet 20 mg  20 mg Oral BID Carrol Price NP   20 mg at 06/20/25 1730    metoPROLOL succinate (TOPROL-XL) ER tablet 25 mg  25 mg Oral Daily Armando Villavicencio MD   25 mg at 06/24/25 0825    spironolactone (ALDACTONE) tablet 25 mg  25 mg Oral Daily Armando Villavicencio MD   25 mg at 06/24/25 0825    Magnesium Standard Replacement Protocol   Does not apply See Admin Instructions Armando Villavicencio MD        Potassium Standard Replacement Protocol (Levels 3.5 and lower)   Does not apply See Admin Instructions Armando Villavicencio MD        docusate sodium-sennosides (SENOKOT S) 50-8.6 MG 2 tablet  2 tablet Oral BID Zoila Mcclendon APNP   2 tablet at 06/20/25 0846    apixaBAN (ELIQUIS) tablet 5 mg  5 mg Oral 2 times per day Zoila Mcclendon APNP   5 mg at  06/24/25 0825    polyethylene glycol (MIRALAX) packet 17 g  17 g Oral BID Zoila Mcclendon APNP   17 g at 06/20/25 0846    ferrous sulfate (65 mg Fe per 325 mg) tablet 325 mg  325 mg Oral Daily with breakfast Israel Frazier MD   325 mg at 06/24/25 0825    sodium chloride 0.9 % injection 2 mL  2 mL Intracatheter 2 times per day Andrés Jorgensen MD   2 mL at 06/24/25 0826    aspirin chewable 81 mg  81 mg Oral Daily Andrés Jorgensen MD   81 mg at 06/24/25 0825    clopidogrel (PLAVIX) tablet 75 mg  75 mg Oral Daily Andrés Jorgensen MD   75 mg at 06/24/25 0825    atorvastatin (LIPITOR) tablet 80 mg  80 mg Oral Daily Toya Spear CNP   80 mg at 06/24/25 0825    AMIODarone (PACERONE) tablet 200 mg  200 mg Oral Daily Orestes Phoenix DO   200 mg at 06/24/25 0825    insulin lispro (ADMELOG,HumaLOG) - Correction Dose   Subcutaneous TID WC Ruth Soto DO   2 Units at 06/24/25 1159    sodium chloride 0.9 % injection 2 mL  2 mL Intracatheter 2 times per day Melanie Cole CNP   2 mL at 06/24/25 0826    insulin lispro (ADMELOG,HumaLOG) - Correction Dose   Subcutaneous Nightly Melanie Cole CNP   2 Units at 06/22/25 2039    Potassium Replacement (Levels 3.6 - 4)   Does not apply See Admin Instructions Melanie Cole CNP        acyclovir (ZOVIRAX) tablet 400 mg  400 mg Oral BID Melanie Cole CNP   400 mg at 06/24/25 0825    cholestyramine/aspartame (QUESTRAN LIGHT) packet 4 g  4 g Oral BID Melanie Cole CNP   4 g at 06/22/25 2100    famotidine (PEPCID) tablet 20 mg  20 mg Oral Daily Melanie Cole CNP   20 mg at 06/24/25 0825    latanoprost (XALATAN) 0.005 % ophthalmic solution 1 drop  1 drop Both Eyes Nightly Melanie Cole CNP   1 drop at 06/22/25 2040    fluticasone-vilanterol (BREO ELLIPTA) 200-25 MCG/ACT inhaler 1 puff  1 puff Inhalation Daily Resp Melanie Cole CNP   1 puff at 06/24/25 0827       Current Facility-Administered  Medications   Medication Dose Route Frequency Provider Last Rate Last Admin    sodium chloride 0.9% infusion   Intravenous Continuous PRN Melanie Cole CNP           Current Facility-Administered Medications   Medication Dose Route Frequency Provider Last Rate Last Admin    guaiFENesin-DM (MUCINEX DM) 600-30 mg ER tablet 2 tablet  2 tablet Oral Q12H PRN Zoila Mcclendon APNP        sodium chloride 0.9 % injection 10 mL  10 mL Intravenous PRN Andrés Jorgensen MD        benzonatate (TESSALON PERLES) capsule 100 mg  100 mg Oral TID PRN Laquita Lowe APNP   100 mg at 06/19/25 2137    simethicone (MYLICON) tablet 125 mg  125 mg Oral 4x Daily PRN Suhas Nieves MD   125 mg at 06/13/25 1856    melatonin tablet 3 mg  3 mg Oral Nightly PRN Melanie Cole CNP   3 mg at 06/20/25 0216    sodium chloride 0.9 % injection 10 mL  10 mL Injection PRN Melanie Cole CNP        sodium chloride 0.9 % injection 20 mL  20 mL Injection PRN Melanie Cole CNP        sodium chloride 0.9 % injection 10 mL  10 mL Intravenous PRN Melanie Cole CNP        sodium chloride 0.9% infusion   Intravenous Continuous PRN Melanie Cole CNP        acetaminophen (TYLENOL) tablet 650 mg  650 mg Oral Q4H PRN Melanie Cole CNP        Or    acetaminophen (TYLENOL) suppository 650 mg  650 mg Rectal Q4H PRN Melanie Cole CNP        polyethylene glycol (MIRALAX) packet 17 g  17 g Oral Daily PRN Melanie Cole CNP   17 g at 06/17/25 2050    bisacodyl (DULCOLAX) suppository 10 mg  10 mg Rectal Daily PRN Melanie Cole CNP        hydrOXYzine (ATARAX) tablet 10 mg  10 mg Oral Q6H PRN Melanie Cole CNP   10 mg at 06/20/25 2237    dextrose 50 % injection 25 g  25 g Intravenous PRN Melanie Cole CNP        dextrose 50 % injection 12.5 g  12.5 g Intravenous PRN Melanie Cole CNP        glucagon (GLUCAGEN) injection 1 mg  1 mg Intramuscular PRN Melanie Cole CNP         dextrose (GLUTOSE) 40 % gel 15 g  15 g Oral PRN Melanie Cole CNP        dextrose (GLUTOSE) 40 % gel 30 g  30 g Oral PRN Melanie Cole, CNP              Last Recorded Vitals      SpO2 Readings from Last 3 Encounters:   06/24/25 100%   06/10/25 100%   05/14/25 99%        VITAL SIGNS:     Vital Last Value 24 Hour Range   Temperature 97.5 °F (36.4 °C) (06/24/25 1600) Temp  Min: 97.3 °F (36.3 °C)  Max: 98.9 °F (37.2 °C)   Pulse (!) 60 (06/24/25 1600) Pulse  Min: 60  Max: 72   Respiratory 16 (06/24/25 1600) Resp  Min: 16  Max: 16   Non-Invasive  Blood Pressure 110/66 (06/24/25 1600) BP  Min: 97/59  Max: 119/69   Pulse Oximetry 100 % (06/24/25 1600) SpO2  Min: 97 %  Max: 100 %   Arterial   Blood Pressure 122/50 (06/18/25 1000) No data recorded      Vital Today Admitted   Weight 82 kg (180 lb 12.4 oz) (06/24/25 0300) Weight: 85.9 kg (189 lb 6 oz) (06/11/25 0000)   Height N/A Height: 6' 0.01\" (182.9 cm) (06/11/25 0804)   BMI N/A BMI (Calculated): 26.31 (06/11/25 0804)            Physical Exam:  General: Alert and oriented, no acute distress  Eyes: no scleral icterus, no conjunctival erythema   Cardio: S1, S2, RRR, no murmur, rub, gallop or thrills noted.   Pulm: Lungs clear to auscultation bilaterally, no wheeze or rhonchi noted. No chest wall tenderness  GI: Soft, non-tender, nondistended. Normal bowel sounds auscultated x4 quadrants  : No suprapubic Tenderness, no CVA tenderness bilaterally  Ext: No upper or lower extremity edema noted. No cords palpated.   Musculoskeletal: 5/5 strength both upper and lower extremities. No joint tenderness or erythema.  Skin: No abnormal bruising or discoloration noted. No jaundice.   Psych: Appropriate mood and affect. Good Insight and Judgment  Neuro: No focal motor or sensory deficits noted. Pt appropriately follows commands.    Labs   Recent Labs     06/22/25  0529 06/23/25  0547 06/24/25  0505   WBC 4.2 4.1* 4.1*   RBC 3.46* 3.22* 3.50*   HGB 9.8* 9.2* 9.7*   HCT  30.8* 28.6* 31.4*    183 191   MCV 89.0 88.8 89.7   MCH 28.3 28.6 27.7   MCHC 31.8* 32.2 30.9*   NRBCRE 0 0 0         Recent Labs     06/22/25  0529 06/23/25  0547 06/24/25  0505   SODIUM 142 140 137   POTASSIUM 4.1 3.9 4.5   CO2 23 24 22   ANIONGAP 13 10 9   GLUCOSE 123* 187* 139*   BUN 21* 20 21*   CREATININE 1.51* 1.42* 1.35*   CALCIUM 8.4 8.3* 8.5        Recent Labs   Lab 06/24/25  0505 06/23/25  0547 06/22/25  0529 06/21/25  0901 06/20/25  0509   SODIUM 137 140 142 136 135   POTASSIUM 4.5 3.9 4.1 3.9 4.2   CHLORIDE 111* 110 110 107 108   CO2 22 24 23 24 23   BUN 21* 20 21* 15 13   CREATININE 1.35* 1.42* 1.51* 1.54* 1.28*   GLUCOSE 139* 187* 123* 200* 145*   ALBUMIN  --   --   --  2.1* 2.2*   AST  --   --   --  21 20   BILIRUBIN  --   --   --  0.8 0.8       No results for input(s): \"PCT\" in the last 72 hours.     Recent Labs   Lab 06/20/25  0509   NTPROB 19,343*        No results for input(s): \"INR\", \"PT\", \"PTT\" in the last 72 hours.      Recent Labs   Lab 06/23/25 2024 06/24/25  0729 06/24/25  1104 06/24/25  1558   GLUCOSE BEDSIDE 186* 116* 185* 126*       Imaging    XR CHEST AP OR PA   Final Result      1.   Left internal jugular Nerinx-Monisha catheter terminates over the proximal   right pulmonary artery.   2.   Unchanged small right pleural effusion and mild right basilar   atelectasis/airspace disease.            Electronically Signed by: DULCE MARIA CONCEPCION MD    Signed on: 6/18/2025 11:57 AM    Workstation ID: [id number]      XR CHEST AP OR PA   Final Result      No acute change in cardiopulmonary appearance as described. Devices as   above.      Electronically Signed by: RICHARD WYNNE M.D.    Signed on: 6/17/2025 9:32 AM    Workstation ID: [id number]       VASC LOWER EXTREMITY ARTERIES DUPLEX RIGHT   Final Result   Nonvisualized blood flow in the right distal peroneal artery and dorsalis    pedis artery         Electronically signed by Bora Mancera MD on 06-17-25 at 0428      Cath/PV Case   Final  Result      Cath/EP Case   Final Result      XR CHEST AP OR PA   Final Result      No acute change in cardiopulmonary appearance as described. Devices as   above.      Electronically Signed by: RICHARD WYNNE M.D.    Signed on: 6/16/2025 8:12 AM    Workstation ID: [id number]      XR CHEST AP OR PA   Final Result   Impression:    Stable exam as above. Support devices as above.      Electronically Signed by: IXN GUERRERO MD    Signed on: 6/15/2025 3:45 PM    Workstation ID: [id number]      XR CHEST AP OR PA   Final Result   Impression:    Stable exam as above. Support devices as above.      Electronically Signed by: XIN GUERRERO MD    Signed on: 6/15/2025 9:29 AM    Workstation ID: [id number]      XR CHEST AP OR PA   Final Result   Stable small right pleural effusion and mild bibasilar interstitial   opacities.      Electronically Signed by: JANETH JUDGE MD    Signed on: 6/14/2025 10:07 AM    Workstation ID: [id number]      US VASC UPPER EXTREMITY VENOUS DUPLEX RIGHT   Final Result      1.   No ultrasound evidence of deep venous thrombosis in the visualized   veins. Internal jugular and subclavian veins limited due to the presence of   lines.                     Electronically Signed by: CELESTINO COTE MD    Signed on: 6/13/2025 8:14 PM    Workstation ID: [id number]      US SOFT TISSUE UPPER EXTREMITY RIGHT   Final Result   FINDINGS/IMPRESSION:      The area of concern in the right forearm, corresponding to area of bruising   on physical exam is evaluated. No organized drainable hematoma or fluid   collection is identified. Nonspecific mild edema.   No solid or cystic nodule is seen. Normal tissue planes are identified.            Electronically Signed by: XIN GUERRERO MD    Signed on: 6/14/2025 5:51 AM    Workstation ID: [id number]      XR CHEST AP OR PA   Final Result   Impression:       Stable exam as above. Support devices as above.      Electronically Signed by: XIN GUERRERO MD    Signed on:  6/13/2025 11:25 AM    Workstation ID: [id number]      XR CHEST AP OR PA   Final Result   Impression:   Stable exam as above. Support devices as above.      Electronically Signed by: BREANNA GARCIA MD    Signed on: 6/12/2025 1:12 PM    Workstation ID: [id number]      CT ABDOMEN PELVIS WO CONTRAST   Final Result         1. Dilatation of the colon with segmental narrowing of the sigmoid colon   could be from early partial bowel obstruction versus ileus. The small bowel   loops are normal in caliber.   2. No free fluid or free air in the abdomen and pelvis   3. Lytic lesions in L1 and T11 are indeterminate. Metastases not excluded.   PET/CT scan may be considered.   4. Bilateral pleural effusions and lower lobe infiltrates   5. Additional findings in the body of the report                     Electronically Signed by: NINI WATKINS M.D.    Signed on: 6/11/2025 6:04 PM    Workstation ID: [id number]      XR ABDOMEN 1 VIEW   Final Result   FINDINGS/IMPRESSION:        Supine exam of the abdomen and pelvis was performed. Moderate gaseous   distention of bowel throughout the abdomen and pelvis. Paucity of bowel gas   in the partially imaged distal pelvis.      Differential diagnosis includes sigmoid volvulus, adynamic ileus and distal   obstruction.         Electronically Signed by: AAMIR VALDES    Signed on: 6/11/2025 1:00 PM    Workstation ID: [id number]       VASC CAROTID DUPLEX LEFT   Final Result   Less than 50% stenosis in the left ICA.      ____________________________________________      [facility] UNIFORM INTERPRETATION CRITERIA* (2023)   Normal -   no sonographically visible plaque or intimal thickening and no   velocity elevation   <50% stenosis -   PSV <180 cm/s, with visible atherosclerotic plaque with   volume estimate <= 50%   50-69% stenosis -    - 230 cm/s, with visible plaque estimate at   least 50%   50-69%, alternate -   PSV<180 cm/s, but with >50% plaque and ratio >2.0 and   spectral  broadening   >70% stenosis -   PSV >230 cm/s, with plaque estimate >= 50%   >80% stenosis -   >70% parameters, and with EDV >140 cm/s   Near occlusion -   markedly narrowed ICA lumen with variably high, low, or   undetectable PSV   Total occlusion -   no detectable patent lumen and no flow with spectral,   power, and color Doppler US      * valid for native bifurcation and ICA disease only   * do not apply with endarterectomy, stent, CCA stenosis, dissection, FMD,   or global waveform abnormalities      Full [facility] interpretation criteria available at:   [email or web address]   [facility]MW%20Uniform%20Interpretation%20Vascular%20Imaging%20Criteria.pdf?[email or web address]   Te=[email or web address]         Electronically Signed by: AAMIR VALDES    Signed on: 6/11/2025 11:39 AM    Workstation ID: [id number]      XR CHEST AP OR PA   Final Result      Intra-aortic balloon pump marker projects 2.5 cm below the level of the   aortic arch.      Bilateral pleural effusions and bibasilar airspace opacities are unchanged.      Electronically Signed by: CARLYN TOVAR M.D.    Signed on: 6/11/2025 8:21 AM    Workstation ID: [id number]          Cultures  Microbiology Results       None            All imaging, labs, vitals and related tests have been reviewed and interpreted by me.                          78 year old man with a history of HTN, HLD, kidney stones, GERD, BPH, and possible dementia who presents with subacute dizziness vs vertigo.